# Patient Record
Sex: MALE
[De-identification: names, ages, dates, MRNs, and addresses within clinical notes are randomized per-mention and may not be internally consistent; named-entity substitution may affect disease eponyms.]

---

## 2017-04-27 ENCOUNTER — RECORDS - HEALTHEAST (OUTPATIENT)
Dept: ADMINISTRATIVE | Facility: OTHER | Age: 57
End: 2017-04-27

## 2017-04-28 ENCOUNTER — RECORDS - HEALTHEAST (OUTPATIENT)
Dept: ADMINISTRATIVE | Facility: OTHER | Age: 57
End: 2017-04-28

## 2017-05-04 ENCOUNTER — RECORDS - HEALTHEAST (OUTPATIENT)
Dept: ADMINISTRATIVE | Facility: OTHER | Age: 57
End: 2017-05-04

## 2017-05-18 ENCOUNTER — RECORDS - HEALTHEAST (OUTPATIENT)
Dept: ADMINISTRATIVE | Facility: OTHER | Age: 57
End: 2017-05-18

## 2017-06-16 ENCOUNTER — RECORDS - HEALTHEAST (OUTPATIENT)
Dept: ADMINISTRATIVE | Facility: OTHER | Age: 57
End: 2017-06-16

## 2017-09-15 ENCOUNTER — RECORDS - HEALTHEAST (OUTPATIENT)
Dept: ADMINISTRATIVE | Facility: OTHER | Age: 57
End: 2017-09-15

## 2017-09-21 ENCOUNTER — RECORDS - HEALTHEAST (OUTPATIENT)
Dept: ADMINISTRATIVE | Facility: OTHER | Age: 57
End: 2017-09-21

## 2017-10-06 ENCOUNTER — RECORDS - HEALTHEAST (OUTPATIENT)
Dept: ADMINISTRATIVE | Facility: OTHER | Age: 57
End: 2017-10-06

## 2017-10-10 ENCOUNTER — RECORDS - HEALTHEAST (OUTPATIENT)
Dept: ADMINISTRATIVE | Facility: OTHER | Age: 57
End: 2017-10-10

## 2017-10-11 ENCOUNTER — RECORDS - HEALTHEAST (OUTPATIENT)
Dept: ADMINISTRATIVE | Facility: OTHER | Age: 57
End: 2017-10-11

## 2017-10-19 ENCOUNTER — RECORDS - HEALTHEAST (OUTPATIENT)
Dept: ADMINISTRATIVE | Facility: OTHER | Age: 57
End: 2017-10-19

## 2017-10-20 ENCOUNTER — RECORDS - HEALTHEAST (OUTPATIENT)
Dept: ADMINISTRATIVE | Facility: OTHER | Age: 57
End: 2017-10-20

## 2017-11-01 ENCOUNTER — RECORDS - HEALTHEAST (OUTPATIENT)
Dept: ADMINISTRATIVE | Facility: OTHER | Age: 57
End: 2017-11-01

## 2017-11-10 ENCOUNTER — RECORDS - HEALTHEAST (OUTPATIENT)
Dept: ADMINISTRATIVE | Facility: OTHER | Age: 57
End: 2017-11-10

## 2017-11-14 ENCOUNTER — RECORDS - HEALTHEAST (OUTPATIENT)
Dept: ADMINISTRATIVE | Facility: OTHER | Age: 57
End: 2017-11-14

## 2017-11-16 ENCOUNTER — RECORDS - HEALTHEAST (OUTPATIENT)
Dept: ADMINISTRATIVE | Facility: OTHER | Age: 57
End: 2017-11-16

## 2017-11-21 ENCOUNTER — RECORDS - HEALTHEAST (OUTPATIENT)
Dept: ADMINISTRATIVE | Facility: OTHER | Age: 57
End: 2017-11-21

## 2017-11-24 ENCOUNTER — RECORDS - HEALTHEAST (OUTPATIENT)
Dept: ADMINISTRATIVE | Facility: OTHER | Age: 57
End: 2017-11-24

## 2017-11-27 ENCOUNTER — RECORDS - HEALTHEAST (OUTPATIENT)
Dept: ADMINISTRATIVE | Facility: OTHER | Age: 57
End: 2017-11-27

## 2017-12-06 ENCOUNTER — RECORDS - HEALTHEAST (OUTPATIENT)
Dept: ADMINISTRATIVE | Facility: OTHER | Age: 57
End: 2017-12-06

## 2017-12-07 ENCOUNTER — RECORDS - HEALTHEAST (OUTPATIENT)
Dept: ADMINISTRATIVE | Facility: OTHER | Age: 57
End: 2017-12-07

## 2017-12-13 ENCOUNTER — RECORDS - HEALTHEAST (OUTPATIENT)
Dept: ADMINISTRATIVE | Facility: OTHER | Age: 57
End: 2017-12-13

## 2017-12-19 ENCOUNTER — RECORDS - HEALTHEAST (OUTPATIENT)
Dept: ADMINISTRATIVE | Facility: OTHER | Age: 57
End: 2017-12-19

## 2017-12-21 ENCOUNTER — RECORDS - HEALTHEAST (OUTPATIENT)
Dept: ADMINISTRATIVE | Facility: OTHER | Age: 57
End: 2017-12-21

## 2017-12-26 ENCOUNTER — RECORDS - HEALTHEAST (OUTPATIENT)
Dept: ADMINISTRATIVE | Facility: OTHER | Age: 57
End: 2017-12-26

## 2017-12-28 ENCOUNTER — RECORDS - HEALTHEAST (OUTPATIENT)
Dept: ADMINISTRATIVE | Facility: OTHER | Age: 57
End: 2017-12-28

## 2018-01-04 ENCOUNTER — RECORDS - HEALTHEAST (OUTPATIENT)
Dept: ADMINISTRATIVE | Facility: OTHER | Age: 58
End: 2018-01-04

## 2018-01-09 ENCOUNTER — RECORDS - HEALTHEAST (OUTPATIENT)
Dept: ADMINISTRATIVE | Facility: OTHER | Age: 58
End: 2018-01-09

## 2018-01-16 ENCOUNTER — RECORDS - HEALTHEAST (OUTPATIENT)
Dept: ADMINISTRATIVE | Facility: OTHER | Age: 58
End: 2018-01-16

## 2018-01-18 ENCOUNTER — RECORDS - HEALTHEAST (OUTPATIENT)
Dept: ADMINISTRATIVE | Facility: OTHER | Age: 58
End: 2018-01-18

## 2018-01-26 ENCOUNTER — RECORDS - HEALTHEAST (OUTPATIENT)
Dept: ADMINISTRATIVE | Facility: OTHER | Age: 58
End: 2018-01-26

## 2018-02-15 ENCOUNTER — RECORDS - HEALTHEAST (OUTPATIENT)
Dept: ADMINISTRATIVE | Facility: OTHER | Age: 58
End: 2018-02-15

## 2018-02-22 ENCOUNTER — RECORDS - HEALTHEAST (OUTPATIENT)
Dept: ADMINISTRATIVE | Facility: OTHER | Age: 58
End: 2018-02-22

## 2018-02-26 ENCOUNTER — RECORDS - HEALTHEAST (OUTPATIENT)
Dept: ADMINISTRATIVE | Facility: OTHER | Age: 58
End: 2018-02-26

## 2018-02-27 ENCOUNTER — RECORDS - HEALTHEAST (OUTPATIENT)
Dept: ADMINISTRATIVE | Facility: OTHER | Age: 58
End: 2018-02-27

## 2018-03-22 ENCOUNTER — RECORDS - HEALTHEAST (OUTPATIENT)
Dept: ADMINISTRATIVE | Facility: OTHER | Age: 58
End: 2018-03-22

## 2018-03-26 ENCOUNTER — RECORDS - HEALTHEAST (OUTPATIENT)
Dept: ADMINISTRATIVE | Facility: OTHER | Age: 58
End: 2018-03-26

## 2018-03-29 ENCOUNTER — RECORDS - HEALTHEAST (OUTPATIENT)
Dept: ADMINISTRATIVE | Facility: OTHER | Age: 58
End: 2018-03-29

## 2018-04-05 ENCOUNTER — RECORDS - HEALTHEAST (OUTPATIENT)
Dept: ADMINISTRATIVE | Facility: OTHER | Age: 58
End: 2018-04-05

## 2018-04-10 ENCOUNTER — RECORDS - HEALTHEAST (OUTPATIENT)
Dept: ADMINISTRATIVE | Facility: OTHER | Age: 58
End: 2018-04-10

## 2018-04-12 ENCOUNTER — RECORDS - HEALTHEAST (OUTPATIENT)
Dept: ADMINISTRATIVE | Facility: OTHER | Age: 58
End: 2018-04-12

## 2018-04-16 ENCOUNTER — RECORDS - HEALTHEAST (OUTPATIENT)
Dept: ADMINISTRATIVE | Facility: OTHER | Age: 58
End: 2018-04-16

## 2018-04-17 ENCOUNTER — RECORDS - HEALTHEAST (OUTPATIENT)
Dept: ADMINISTRATIVE | Facility: OTHER | Age: 58
End: 2018-04-17

## 2018-04-27 ENCOUNTER — OFFICE VISIT - HEALTHEAST (OUTPATIENT)
Dept: ONCOLOGY | Facility: HOSPITAL | Age: 58
End: 2018-04-27

## 2018-04-27 ENCOUNTER — RECORDS - HEALTHEAST (OUTPATIENT)
Dept: ADMINISTRATIVE | Facility: OTHER | Age: 58
End: 2018-04-27

## 2018-04-27 DIAGNOSIS — R59.0 MEDIASTINAL LYMPHADENOPATHY: ICD-10-CM

## 2018-04-27 DIAGNOSIS — C61 MALIGNANT NEOPLASM OF PROSTATE (H): ICD-10-CM

## 2018-04-27 DIAGNOSIS — M54.16 LUMBAR RADICULOPATHY: ICD-10-CM

## 2018-04-27 DIAGNOSIS — C90.30 PLASMACYTOMA (H): ICD-10-CM

## 2018-04-27 RX ORDER — LISINOPRIL 40 MG/1
40 TABLET ORAL DAILY
Status: SHIPPED | COMMUNITY
Start: 2018-04-27

## 2018-05-02 ENCOUNTER — OFFICE VISIT - HEALTHEAST (OUTPATIENT)
Dept: RADIATION ONCOLOGY | Facility: HOSPITAL | Age: 58
End: 2018-05-02

## 2018-05-02 DIAGNOSIS — C61 MALIGNANT NEOPLASM OF PROSTATE (H): ICD-10-CM

## 2018-05-02 DIAGNOSIS — C90.30 PLASMACYTOMA (H): ICD-10-CM

## 2018-05-11 ENCOUNTER — RECORDS - HEALTHEAST (OUTPATIENT)
Dept: ADMINISTRATIVE | Facility: OTHER | Age: 58
End: 2018-05-11

## 2018-05-11 ENCOUNTER — AMBULATORY - HEALTHEAST (OUTPATIENT)
Dept: ONCOLOGY | Facility: HOSPITAL | Age: 58
End: 2018-05-11

## 2018-05-11 ENCOUNTER — AMBULATORY - HEALTHEAST (OUTPATIENT)
Dept: INFUSION THERAPY | Facility: HOSPITAL | Age: 58
End: 2018-05-11

## 2018-05-11 ENCOUNTER — INFUSION - HEALTHEAST (OUTPATIENT)
Dept: INFUSION THERAPY | Facility: HOSPITAL | Age: 58
End: 2018-05-11

## 2018-05-11 ENCOUNTER — OFFICE VISIT - HEALTHEAST (OUTPATIENT)
Dept: ONCOLOGY | Facility: HOSPITAL | Age: 58
End: 2018-05-11

## 2018-05-11 DIAGNOSIS — C90.30 PLASMACYTOMA (H): ICD-10-CM

## 2018-05-11 DIAGNOSIS — C61 MALIGNANT NEOPLASM OF PROSTATE (H): ICD-10-CM

## 2018-05-11 DIAGNOSIS — R59.0 MEDIASTINAL LYMPHADENOPATHY: ICD-10-CM

## 2018-05-11 DIAGNOSIS — E04.1 THYROID NODULE: ICD-10-CM

## 2018-05-11 LAB
ALBUMIN SERPL-MCNC: 3.7 G/DL (ref 3.5–5)
ALP SERPL-CCNC: 39 U/L (ref 45–120)
ALT SERPL W P-5'-P-CCNC: 38 U/L (ref 0–45)
ANION GAP SERPL CALCULATED.3IONS-SCNC: 10 MMOL/L (ref 5–18)
AST SERPL W P-5'-P-CCNC: 18 U/L (ref 0–40)
BASOPHILS # BLD AUTO: 0 THOU/UL (ref 0–0.2)
BASOPHILS NFR BLD AUTO: 0 % (ref 0–2)
BILIRUB SERPL-MCNC: 0.4 MG/DL (ref 0–1)
BUN SERPL-MCNC: 17 MG/DL (ref 8–22)
CALCIUM SERPL-MCNC: 9.8 MG/DL (ref 8.5–10.5)
CHLORIDE BLD-SCNC: 106 MMOL/L (ref 98–107)
CO2 SERPL-SCNC: 27 MMOL/L (ref 22–31)
CREAT SERPL-MCNC: 0.73 MG/DL (ref 0.7–1.3)
EOSINOPHIL COUNT (ABSOLUTE): 0 THOU/UL (ref 0–0.4)
EOSINOPHIL NFR BLD AUTO: 0 % (ref 0–6)
ERYTHROCYTE [DISTWIDTH] IN BLOOD BY AUTOMATED COUNT: 11.9 % (ref 11–14.5)
GFR SERPL CREATININE-BSD FRML MDRD: >60 ML/MIN/1.73M2
GLUCOSE BLD-MCNC: 92 MG/DL (ref 70–125)
HCT VFR BLD AUTO: 39 % (ref 40–54)
HGB BLD-MCNC: 13.6 G/DL (ref 14–18)
LYMPHOCYTES # BLD AUTO: 2 THOU/UL (ref 0.8–4.4)
LYMPHOCYTES NFR BLD AUTO: 17 % (ref 20–40)
MCH RBC QN AUTO: 34.2 PG (ref 27–34)
MCHC RBC AUTO-ENTMCNC: 34.9 G/DL (ref 32–36)
MCV RBC AUTO: 98 FL (ref 80–100)
MONOCYTES # BLD AUTO: 1.1 THOU/UL (ref 0–0.9)
MONOCYTES NFR BLD AUTO: 9 % (ref 2–10)
OVALOCYTES: ABNORMAL
PLAT MORPH BLD: NORMAL
PLATELET # BLD AUTO: 221 THOU/UL (ref 140–440)
PMV BLD AUTO: 10.2 FL (ref 8.5–12.5)
POTASSIUM BLD-SCNC: 3.9 MMOL/L (ref 3.5–5)
PROT SERPL-MCNC: 6.8 G/DL (ref 6–8)
PSA SERPL-MCNC: 5.2 NG/ML (ref 0–3.5)
RBC # BLD AUTO: 3.98 MILL/UL (ref 4.4–6.2)
SODIUM SERPL-SCNC: 143 MMOL/L (ref 136–145)
T4 FREE SERPL-MCNC: 0.6 NG/DL (ref 0.7–1.8)
TOTAL NEUTROPHILS-ABS(DIFF): 8.7 THOU/UL (ref 2–7.7)
TOTAL NEUTROPHILS-REL(DIFF): 74 % (ref 50–70)
TSH SERPL DL<=0.005 MIU/L-ACNC: 21.01 UIU/ML (ref 0.3–5)
WBC: 11.7 THOU/UL (ref 4–11)

## 2018-05-15 LAB
LAB AP CHARGES (HE HISTORICAL CONVERSION): ABNORMAL
PATH REPORT.COMMENTS IMP SPEC: ABNORMAL
PATH REPORT.COMMENTS IMP SPEC: ABNORMAL
PATH REPORT.FINAL DX SPEC: ABNORMAL
PATH REPORT.GROSS SPEC: ABNORMAL
PATH REPORT.MICROSCOPIC SPEC OTHER STN: ABNORMAL
PATH REPORT.RELEVANT HX SPEC: ABNORMAL
RESULT FLAG (HE HISTORICAL CONVERSION): ABNORMAL

## 2018-05-16 ENCOUNTER — RECORDS - HEALTHEAST (OUTPATIENT)
Dept: ADMINISTRATIVE | Facility: OTHER | Age: 58
End: 2018-05-16

## 2018-05-18 ENCOUNTER — INFUSION - HEALTHEAST (OUTPATIENT)
Dept: INFUSION THERAPY | Facility: HOSPITAL | Age: 58
End: 2018-05-18

## 2018-05-18 ENCOUNTER — AMBULATORY - HEALTHEAST (OUTPATIENT)
Dept: INFUSION THERAPY | Facility: HOSPITAL | Age: 58
End: 2018-05-18

## 2018-05-18 DIAGNOSIS — C90.30 PLASMACYTOMA (H): ICD-10-CM

## 2018-05-18 LAB
BASOPHILS # BLD AUTO: 0 THOU/UL (ref 0–0.2)
BASOPHILS NFR BLD AUTO: 0 % (ref 0–2)
EOSINOPHIL # BLD AUTO: 0.3 THOU/UL (ref 0–0.4)
EOSINOPHIL NFR BLD AUTO: 3 % (ref 0–6)
ERYTHROCYTE [DISTWIDTH] IN BLOOD BY AUTOMATED COUNT: 12.3 % (ref 11–14.5)
HCT VFR BLD AUTO: 38.3 % (ref 40–54)
HGB BLD-MCNC: 13.3 G/DL (ref 14–18)
LYMPHOCYTES # BLD AUTO: 2.1 THOU/UL (ref 0.8–4.4)
LYMPHOCYTES NFR BLD AUTO: 20 % (ref 20–40)
MCH RBC QN AUTO: 34.5 PG (ref 27–34)
MCHC RBC AUTO-ENTMCNC: 34.7 G/DL (ref 32–36)
MCV RBC AUTO: 100 FL (ref 80–100)
MONOCYTES # BLD AUTO: 0.9 THOU/UL (ref 0–0.9)
MONOCYTES NFR BLD AUTO: 8 % (ref 2–10)
NEUTROPHILS # BLD AUTO: 7.4 THOU/UL (ref 2–7.7)
NEUTROPHILS NFR BLD AUTO: 69 % (ref 50–70)
PLATELET # BLD AUTO: 212 THOU/UL (ref 140–440)
PMV BLD AUTO: 10.5 FL (ref 8.5–12.5)
RBC # BLD AUTO: 3.85 MILL/UL (ref 4.4–6.2)
WBC: 10.8 THOU/UL (ref 4–11)

## 2018-05-24 ENCOUNTER — COMMUNICATION - HEALTHEAST (OUTPATIENT)
Dept: ONCOLOGY | Facility: HOSPITAL | Age: 58
End: 2018-05-24

## 2018-05-25 ENCOUNTER — INFUSION - HEALTHEAST (OUTPATIENT)
Dept: INFUSION THERAPY | Facility: HOSPITAL | Age: 58
End: 2018-05-25

## 2018-05-25 DIAGNOSIS — C90.30 PLASMACYTOMA (H): ICD-10-CM

## 2018-05-29 ENCOUNTER — AMBULATORY - HEALTHEAST (OUTPATIENT)
Dept: ONCOLOGY | Facility: HOSPITAL | Age: 58
End: 2018-05-29

## 2018-05-29 DIAGNOSIS — C61 MALIGNANT NEOPLASM OF PROSTATE (H): ICD-10-CM

## 2018-05-30 ENCOUNTER — HOSPITAL ENCOUNTER (OUTPATIENT)
Dept: PET IMAGING | Facility: HOSPITAL | Age: 58
Setting detail: RADIATION/ONCOLOGY SERIES
Discharge: STILL ADMITTED OR ADMINISTRATIVE | End: 2018-05-30

## 2018-05-30 DIAGNOSIS — R59.0 MEDIASTINAL LYMPHADENOPATHY: ICD-10-CM

## 2018-05-30 DIAGNOSIS — C61 PROSTATE CANCER METASTATIC TO BONE (H): ICD-10-CM

## 2018-05-30 DIAGNOSIS — C79.51 PROSTATE CANCER METASTATIC TO BONE (H): ICD-10-CM

## 2018-05-30 DIAGNOSIS — C90.00 MULTIPLE MYELOMA, REMISSION STATUS UNSPECIFIED (H): ICD-10-CM

## 2018-05-30 LAB — GLUCOSE BLDC GLUCOMTR-MCNC: 90 MG/DL

## 2018-05-30 ASSESSMENT — MIFFLIN-ST. JEOR: SCORE: 1781.95

## 2018-06-01 ENCOUNTER — INFUSION - HEALTHEAST (OUTPATIENT)
Dept: INFUSION THERAPY | Facility: HOSPITAL | Age: 58
End: 2018-06-01

## 2018-06-01 ENCOUNTER — AMBULATORY - HEALTHEAST (OUTPATIENT)
Dept: INFUSION THERAPY | Facility: HOSPITAL | Age: 58
End: 2018-06-01

## 2018-06-01 DIAGNOSIS — C90.30 PLASMACYTOMA (H): ICD-10-CM

## 2018-06-01 LAB
ALBUMIN SERPL-MCNC: 3.6 G/DL (ref 3.5–5)
ALP SERPL-CCNC: 37 U/L (ref 45–120)
ALT SERPL W P-5'-P-CCNC: 41 U/L (ref 0–45)
ANION GAP SERPL CALCULATED.3IONS-SCNC: 10 MMOL/L (ref 5–18)
AST SERPL W P-5'-P-CCNC: 17 U/L (ref 0–40)
BASOPHILS # BLD AUTO: 0.1 THOU/UL (ref 0–0.2)
BASOPHILS NFR BLD AUTO: 1 % (ref 0–2)
BILIRUB SERPL-MCNC: 0.6 MG/DL (ref 0–1)
BUN SERPL-MCNC: 17 MG/DL (ref 8–22)
CALCIUM SERPL-MCNC: 9.4 MG/DL (ref 8.5–10.5)
CHLORIDE BLD-SCNC: 106 MMOL/L (ref 98–107)
CO2 SERPL-SCNC: 27 MMOL/L (ref 22–31)
CREAT SERPL-MCNC: 0.77 MG/DL (ref 0.7–1.3)
EOSINOPHIL # BLD AUTO: 0.2 THOU/UL (ref 0–0.4)
EOSINOPHIL NFR BLD AUTO: 2 % (ref 0–6)
ERYTHROCYTE [DISTWIDTH] IN BLOOD BY AUTOMATED COUNT: 13 % (ref 11–14.5)
GFR SERPL CREATININE-BSD FRML MDRD: >60 ML/MIN/1.73M2
GLUCOSE BLD-MCNC: 98 MG/DL (ref 70–125)
HCT VFR BLD AUTO: 37.6 % (ref 40–54)
HGB BLD-MCNC: 12.9 G/DL (ref 14–18)
LYMPHOCYTES # BLD AUTO: 1.5 THOU/UL (ref 0.8–4.4)
LYMPHOCYTES NFR BLD AUTO: 16 % (ref 20–40)
MCH RBC QN AUTO: 34 PG (ref 27–34)
MCHC RBC AUTO-ENTMCNC: 34.3 G/DL (ref 32–36)
MCV RBC AUTO: 99 FL (ref 80–100)
MONOCYTES # BLD AUTO: 0.8 THOU/UL (ref 0–0.9)
MONOCYTES NFR BLD AUTO: 8 % (ref 2–10)
NEUTROPHILS # BLD AUTO: 7.3 THOU/UL (ref 2–7.7)
NEUTROPHILS NFR BLD AUTO: 74 % (ref 50–70)
PLATELET # BLD AUTO: 243 THOU/UL (ref 140–440)
PMV BLD AUTO: 10.8 FL (ref 8.5–12.5)
POTASSIUM BLD-SCNC: 3.9 MMOL/L (ref 3.5–5)
PROT SERPL-MCNC: 6.5 G/DL (ref 6–8)
RBC # BLD AUTO: 3.79 MILL/UL (ref 4.4–6.2)
SODIUM SERPL-SCNC: 143 MMOL/L (ref 136–145)
WBC: 9.9 THOU/UL (ref 4–11)

## 2018-06-07 LAB
LAB AP CHARGES (HE HISTORICAL CONVERSION): ABNORMAL
PATH REPORT.COMMENTS IMP SPEC: ABNORMAL
PATH REPORT.COMMENTS IMP SPEC: ABNORMAL
PATH REPORT.FINAL DX SPEC: ABNORMAL
PATH REPORT.RELEVANT HX SPEC: ABNORMAL
RESULT FLAG (HE HISTORICAL CONVERSION): ABNORMAL

## 2018-06-15 ENCOUNTER — RECORDS - HEALTHEAST (OUTPATIENT)
Dept: ADMINISTRATIVE | Facility: OTHER | Age: 58
End: 2018-06-15

## 2018-06-15 ENCOUNTER — OFFICE VISIT - HEALTHEAST (OUTPATIENT)
Dept: ONCOLOGY | Facility: HOSPITAL | Age: 58
End: 2018-06-15

## 2018-06-15 ENCOUNTER — INFUSION - HEALTHEAST (OUTPATIENT)
Dept: INFUSION THERAPY | Facility: HOSPITAL | Age: 58
End: 2018-06-15

## 2018-06-15 ENCOUNTER — AMBULATORY - HEALTHEAST (OUTPATIENT)
Dept: INFUSION THERAPY | Facility: HOSPITAL | Age: 58
End: 2018-06-15

## 2018-06-15 DIAGNOSIS — C90.30 PLASMACYTOMA (H): ICD-10-CM

## 2018-06-15 DIAGNOSIS — M51.369 DEGENERATION OF LUMBAR INTERVERTEBRAL DISC: ICD-10-CM

## 2018-06-15 DIAGNOSIS — C79.51 PROSTATE CANCER METASTATIC TO BONE (H): ICD-10-CM

## 2018-06-15 DIAGNOSIS — C61 MALIGNANT NEOPLASM OF PROSTATE (H): ICD-10-CM

## 2018-06-15 DIAGNOSIS — D47.2 SMOLDERING MYELOMA: ICD-10-CM

## 2018-06-15 DIAGNOSIS — C61 PROSTATE CANCER METASTATIC TO BONE (H): ICD-10-CM

## 2018-06-15 DIAGNOSIS — E06.5 THYROIDITIS, CHRONIC: ICD-10-CM

## 2018-06-15 LAB
ALBUMIN SERPL-MCNC: 3.9 G/DL (ref 3.5–5)
ALP SERPL-CCNC: 42 U/L (ref 45–120)
ALT SERPL W P-5'-P-CCNC: 39 U/L (ref 0–45)
ANION GAP SERPL CALCULATED.3IONS-SCNC: 9 MMOL/L (ref 5–18)
AST SERPL W P-5'-P-CCNC: 21 U/L (ref 0–40)
BASOPHILS # BLD AUTO: 0 THOU/UL (ref 0–0.2)
BASOPHILS NFR BLD AUTO: 0 % (ref 0–2)
BILIRUB SERPL-MCNC: 0.6 MG/DL (ref 0–1)
BUN SERPL-MCNC: 18 MG/DL (ref 8–22)
CALCIUM SERPL-MCNC: 9.7 MG/DL (ref 8.5–10.5)
CHLORIDE BLD-SCNC: 106 MMOL/L (ref 98–107)
CO2 SERPL-SCNC: 26 MMOL/L (ref 22–31)
CREAT SERPL-MCNC: 0.73 MG/DL (ref 0.7–1.3)
EOSINOPHIL # BLD AUTO: 0.1 THOU/UL (ref 0–0.4)
EOSINOPHIL NFR BLD AUTO: 1 % (ref 0–6)
ERYTHROCYTE [DISTWIDTH] IN BLOOD BY AUTOMATED COUNT: 13.2 % (ref 11–14.5)
GFR SERPL CREATININE-BSD FRML MDRD: >60 ML/MIN/1.73M2
GLUCOSE BLD-MCNC: 100 MG/DL (ref 70–125)
HCT VFR BLD AUTO: 38.4 % (ref 40–54)
HGB BLD-MCNC: 13.4 G/DL (ref 14–18)
LYMPHOCYTES # BLD AUTO: 0.8 THOU/UL (ref 0.8–4.4)
LYMPHOCYTES NFR BLD AUTO: 6 % (ref 20–40)
MCH RBC QN AUTO: 34.4 PG (ref 27–34)
MCHC RBC AUTO-ENTMCNC: 34.9 G/DL (ref 32–36)
MCV RBC AUTO: 99 FL (ref 80–100)
MONOCYTES # BLD AUTO: 0.3 THOU/UL (ref 0–0.9)
MONOCYTES NFR BLD AUTO: 3 % (ref 2–10)
NEUTROPHILS # BLD AUTO: 10.8 THOU/UL (ref 2–7.7)
NEUTROPHILS NFR BLD AUTO: 90 % (ref 50–70)
PLATELET # BLD AUTO: 320 THOU/UL (ref 140–440)
PMV BLD AUTO: 9.8 FL (ref 8.5–12.5)
POTASSIUM BLD-SCNC: 3.9 MMOL/L (ref 3.5–5)
PROT SERPL-MCNC: 7.1 G/DL (ref 6–8)
RBC # BLD AUTO: 3.9 MILL/UL (ref 4.4–6.2)
SODIUM SERPL-SCNC: 141 MMOL/L (ref 136–145)
WBC: 12 THOU/UL (ref 4–11)

## 2018-06-15 ASSESSMENT — MIFFLIN-ST. JEOR: SCORE: 1786.48

## 2018-06-16 ENCOUNTER — RECORDS - HEALTHEAST (OUTPATIENT)
Dept: ADMINISTRATIVE | Facility: OTHER | Age: 58
End: 2018-06-16

## 2018-06-18 ENCOUNTER — COMMUNICATION - HEALTHEAST (OUTPATIENT)
Dept: PHYSICAL MEDICINE AND REHAB | Facility: CLINIC | Age: 58
End: 2018-06-18

## 2018-06-26 ENCOUNTER — INFUSION - HEALTHEAST (OUTPATIENT)
Dept: INFUSION THERAPY | Facility: HOSPITAL | Age: 58
End: 2018-06-26

## 2018-06-26 DIAGNOSIS — D47.2 SMOLDERING MYELOMA: ICD-10-CM

## 2018-07-03 ENCOUNTER — INFUSION - HEALTHEAST (OUTPATIENT)
Dept: INFUSION THERAPY | Facility: HOSPITAL | Age: 58
End: 2018-07-03

## 2018-07-03 DIAGNOSIS — D47.2 SMOLDERING MYELOMA: ICD-10-CM

## 2018-07-18 ENCOUNTER — OFFICE VISIT - HEALTHEAST (OUTPATIENT)
Dept: ONCOLOGY | Facility: HOSPITAL | Age: 58
End: 2018-07-18

## 2018-07-18 ENCOUNTER — INFUSION - HEALTHEAST (OUTPATIENT)
Dept: INFUSION THERAPY | Facility: HOSPITAL | Age: 58
End: 2018-07-18

## 2018-07-18 ENCOUNTER — RECORDS - HEALTHEAST (OUTPATIENT)
Dept: ADMINISTRATIVE | Facility: OTHER | Age: 58
End: 2018-07-18

## 2018-07-18 ENCOUNTER — AMBULATORY - HEALTHEAST (OUTPATIENT)
Dept: INFUSION THERAPY | Facility: HOSPITAL | Age: 58
End: 2018-07-18

## 2018-07-18 DIAGNOSIS — C61 PROSTATE CANCER (H): ICD-10-CM

## 2018-07-18 DIAGNOSIS — C61 PROSTATE CANCER METASTATIC TO BONE (H): ICD-10-CM

## 2018-07-18 DIAGNOSIS — G47.00 INSOMNIA: ICD-10-CM

## 2018-07-18 DIAGNOSIS — C90.00 MULTIPLE MYELOMA NOT HAVING ACHIEVED REMISSION (H): ICD-10-CM

## 2018-07-18 DIAGNOSIS — D47.2 SMOLDERING MYELOMA: ICD-10-CM

## 2018-07-18 DIAGNOSIS — M51.369 DEGENERATION OF LUMBAR INTERVERTEBRAL DISC: ICD-10-CM

## 2018-07-18 DIAGNOSIS — M54.9 BACK PAIN: ICD-10-CM

## 2018-07-18 DIAGNOSIS — C79.51 PROSTATE CANCER METASTATIC TO BONE (H): ICD-10-CM

## 2018-07-18 LAB
ALBUMIN SERPL-MCNC: 3.8 G/DL (ref 3.5–5)
ALP SERPL-CCNC: 33 U/L (ref 45–120)
ALT SERPL W P-5'-P-CCNC: 17 U/L (ref 0–45)
ANION GAP SERPL CALCULATED.3IONS-SCNC: 10 MMOL/L (ref 5–18)
AST SERPL W P-5'-P-CCNC: 10 U/L (ref 0–40)
BASOPHILS # BLD AUTO: 0 THOU/UL (ref 0–0.2)
BASOPHILS NFR BLD AUTO: 0 % (ref 0–2)
BILIRUB SERPL-MCNC: 0.4 MG/DL (ref 0–1)
BUN SERPL-MCNC: 26 MG/DL (ref 8–22)
CALCIUM SERPL-MCNC: 9.7 MG/DL (ref 8.5–10.5)
CHLORIDE BLD-SCNC: 109 MMOL/L (ref 98–107)
CO2 SERPL-SCNC: 22 MMOL/L (ref 22–31)
CREAT SERPL-MCNC: 0.74 MG/DL (ref 0.7–1.3)
EOSINOPHIL # BLD AUTO: 0 THOU/UL (ref 0–0.4)
EOSINOPHIL NFR BLD AUTO: 0 % (ref 0–6)
ERYTHROCYTE [DISTWIDTH] IN BLOOD BY AUTOMATED COUNT: 13.4 % (ref 11–14.5)
GFR SERPL CREATININE-BSD FRML MDRD: >60 ML/MIN/1.73M2
GLUCOSE BLD-MCNC: 114 MG/DL (ref 70–125)
HCT VFR BLD AUTO: 35.9 % (ref 40–54)
HGB BLD-MCNC: 12.6 G/DL (ref 14–18)
IGA SERPL-MCNC: 331 MG/DL (ref 65–400)
IGA SERPL-MCNC: 906 MG/DL (ref 700–1700)
IGM SERPL-MCNC: 61 MG/DL (ref 60–280)
LYMPHOCYTES # BLD AUTO: 1.5 THOU/UL (ref 0.8–4.4)
LYMPHOCYTES NFR BLD AUTO: 11 % (ref 20–40)
MCH RBC QN AUTO: 34.6 PG (ref 27–34)
MCHC RBC AUTO-ENTMCNC: 35.1 G/DL (ref 32–36)
MCV RBC AUTO: 99 FL (ref 80–100)
MONOCYTES # BLD AUTO: 1.4 THOU/UL (ref 0–0.9)
MONOCYTES NFR BLD AUTO: 10 % (ref 2–10)
NEUTROPHILS # BLD AUTO: 11.5 THOU/UL (ref 2–7.7)
NEUTROPHILS NFR BLD AUTO: 80 % (ref 50–70)
PLATELET # BLD AUTO: 319 THOU/UL (ref 140–440)
PMV BLD AUTO: 10 FL (ref 8.5–12.5)
POTASSIUM BLD-SCNC: 3.7 MMOL/L (ref 3.5–5)
PROT SERPL-MCNC: 6.9 G/DL (ref 6–8)
PSA SERPL-MCNC: 5.1 NG/ML (ref 0–3.5)
RBC # BLD AUTO: 3.64 MILL/UL (ref 4.4–6.2)
SODIUM SERPL-SCNC: 141 MMOL/L (ref 136–145)
WBC: 14.5 THOU/UL (ref 4–11)

## 2018-07-19 LAB
KAPPA LC FREE SER-MCNC: 0.96 MG/DL (ref 0.33–1.94)
KAPPA LC FREE/LAMBDA FREE SER NEPH: 1.1 {RATIO} (ref 0.26–1.65)
LAMBDA LC FREE SERPL-MCNC: 0.87 MG/DL (ref 0.57–2.63)

## 2018-07-20 LAB
ALBUMIN PERCENT: 62.5 % (ref 51–67)
ALBUMIN SERPL ELPH-MCNC: 4.2 G/DL (ref 3.2–4.7)
ALPHA 1 PERCENT: 2.3 % (ref 2–4)
ALPHA 2 PERCENT: 10.6 % (ref 5–13)
ALPHA1 GLOB SERPL ELPH-MCNC: 0.2 G/DL (ref 0.1–0.3)
ALPHA2 GLOB SERPL ELPH-MCNC: 0.7 G/DL (ref 0.4–0.9)
B-GLOBULIN SERPL ELPH-MCNC: 0.8 G/DL (ref 0.7–1.2)
BETA PERCENT: 11.4 % (ref 10–17)
GAMMA GLOB SERPL ELPH-MCNC: 0.9 G/DL (ref 0.6–1.4)
GAMMA GLOBULIN PERCENT: 13.2 % (ref 9–20)
PATH ICD:: NORMAL
PATH ICD:: NORMAL
PROT PATTERN SERPL ELPH-IMP: NORMAL
PROT PATTERN SERPL IFE-IMP: NORMAL
PROT SERPL-MCNC: 6.7 G/DL (ref 6–8)
REVIEWING PATHOLOGIST: NORMAL
REVIEWING PATHOLOGIST: NORMAL

## 2018-08-01 ENCOUNTER — INFUSION - HEALTHEAST (OUTPATIENT)
Dept: INFUSION THERAPY | Facility: HOSPITAL | Age: 58
End: 2018-08-01

## 2018-08-01 ENCOUNTER — AMBULATORY - HEALTHEAST (OUTPATIENT)
Dept: INFUSION THERAPY | Facility: HOSPITAL | Age: 58
End: 2018-08-01

## 2018-08-01 DIAGNOSIS — C79.51 PROSTATE CANCER METASTATIC TO BONE (H): ICD-10-CM

## 2018-08-01 DIAGNOSIS — C90.00 MULTIPLE MYELOMA NOT HAVING ACHIEVED REMISSION (H): ICD-10-CM

## 2018-08-01 DIAGNOSIS — C61 PROSTATE CANCER METASTATIC TO BONE (H): ICD-10-CM

## 2018-08-01 LAB
ALBUMIN SERPL-MCNC: 3.8 G/DL (ref 3.5–5)
ALP SERPL-CCNC: 37 U/L (ref 45–120)
ALT SERPL W P-5'-P-CCNC: 18 U/L (ref 0–45)
ANION GAP SERPL CALCULATED.3IONS-SCNC: 6 MMOL/L (ref 5–18)
AST SERPL W P-5'-P-CCNC: 12 U/L (ref 0–40)
BASOPHILS # BLD AUTO: 0 THOU/UL (ref 0–0.2)
BASOPHILS NFR BLD AUTO: 0 % (ref 0–2)
BILIRUB SERPL-MCNC: 0.8 MG/DL (ref 0–1)
BUN SERPL-MCNC: 26 MG/DL (ref 8–22)
CALCIUM SERPL-MCNC: 10.1 MG/DL (ref 8.5–10.5)
CHLORIDE BLD-SCNC: 107 MMOL/L (ref 98–107)
CO2 SERPL-SCNC: 27 MMOL/L (ref 22–31)
CREAT SERPL-MCNC: 0.77 MG/DL (ref 0.7–1.3)
EOSINOPHIL # BLD AUTO: 0 THOU/UL (ref 0–0.4)
EOSINOPHIL NFR BLD AUTO: 0 % (ref 0–6)
ERYTHROCYTE [DISTWIDTH] IN BLOOD BY AUTOMATED COUNT: 13.6 % (ref 11–14.5)
GFR SERPL CREATININE-BSD FRML MDRD: >60 ML/MIN/1.73M2
GLUCOSE BLD-MCNC: 107 MG/DL (ref 70–125)
HCT VFR BLD AUTO: 35.6 % (ref 40–54)
HGB BLD-MCNC: 12.4 G/DL (ref 14–18)
LYMPHOCYTES # BLD AUTO: 1.8 THOU/UL (ref 0.8–4.4)
LYMPHOCYTES NFR BLD AUTO: 11 % (ref 20–40)
MCH RBC QN AUTO: 35.1 PG (ref 27–34)
MCHC RBC AUTO-ENTMCNC: 34.8 G/DL (ref 32–36)
MCV RBC AUTO: 101 FL (ref 80–100)
MONOCYTES # BLD AUTO: 1.1 THOU/UL (ref 0–0.9)
MONOCYTES NFR BLD AUTO: 7 % (ref 2–10)
NEUTROPHILS # BLD AUTO: 13.1 THOU/UL (ref 2–7.7)
NEUTROPHILS NFR BLD AUTO: 82 % (ref 50–70)
PLATELET # BLD AUTO: 280 THOU/UL (ref 140–440)
PMV BLD AUTO: 10.4 FL (ref 8.5–12.5)
POTASSIUM BLD-SCNC: 4 MMOL/L (ref 3.5–5)
PROT SERPL-MCNC: 7 G/DL (ref 6–8)
RBC # BLD AUTO: 3.53 MILL/UL (ref 4.4–6.2)
SODIUM SERPL-SCNC: 140 MMOL/L (ref 136–145)
WBC: 16.1 THOU/UL (ref 4–11)

## 2018-08-01 ASSESSMENT — MIFFLIN-ST. JEOR: SCORE: 1822.77

## 2018-08-08 ENCOUNTER — INFUSION - HEALTHEAST (OUTPATIENT)
Dept: INFUSION THERAPY | Facility: HOSPITAL | Age: 58
End: 2018-08-08

## 2018-08-08 DIAGNOSIS — C90.00 MULTIPLE MYELOMA NOT HAVING ACHIEVED REMISSION (H): ICD-10-CM

## 2018-08-15 ENCOUNTER — INFUSION - HEALTHEAST (OUTPATIENT)
Dept: INFUSION THERAPY | Facility: HOSPITAL | Age: 58
End: 2018-08-15

## 2018-08-15 DIAGNOSIS — C90.00 MULTIPLE MYELOMA NOT HAVING ACHIEVED REMISSION (H): ICD-10-CM

## 2018-08-22 ENCOUNTER — AMBULATORY - HEALTHEAST (OUTPATIENT)
Dept: INFUSION THERAPY | Facility: HOSPITAL | Age: 58
End: 2018-08-22

## 2018-08-22 ENCOUNTER — INFUSION - HEALTHEAST (OUTPATIENT)
Dept: INFUSION THERAPY | Facility: HOSPITAL | Age: 58
End: 2018-08-22

## 2018-08-22 DIAGNOSIS — C90.00 MULTIPLE MYELOMA NOT HAVING ACHIEVED REMISSION (H): ICD-10-CM

## 2018-08-22 LAB
ALBUMIN SERPL-MCNC: 3.8 G/DL (ref 3.5–5)
ALP SERPL-CCNC: 40 U/L (ref 45–120)
ALT SERPL W P-5'-P-CCNC: 37 U/L (ref 0–45)
ANION GAP SERPL CALCULATED.3IONS-SCNC: 8 MMOL/L (ref 5–18)
AST SERPL W P-5'-P-CCNC: 23 U/L (ref 0–40)
BASOPHILS # BLD AUTO: 0 THOU/UL (ref 0–0.2)
BASOPHILS NFR BLD AUTO: 0 % (ref 0–2)
BILIRUB SERPL-MCNC: 0.8 MG/DL (ref 0–1)
BUN SERPL-MCNC: 15 MG/DL (ref 8–22)
CALCIUM SERPL-MCNC: 9.8 MG/DL (ref 8.5–10.5)
CHLORIDE BLD-SCNC: 107 MMOL/L (ref 98–107)
CO2 SERPL-SCNC: 28 MMOL/L (ref 22–31)
CREAT SERPL-MCNC: 0.79 MG/DL (ref 0.7–1.3)
EOSINOPHIL # BLD AUTO: 0.3 THOU/UL (ref 0–0.4)
EOSINOPHIL NFR BLD AUTO: 3 % (ref 0–6)
ERYTHROCYTE [DISTWIDTH] IN BLOOD BY AUTOMATED COUNT: 13.6 % (ref 11–14.5)
GFR SERPL CREATININE-BSD FRML MDRD: >60 ML/MIN/1.73M2
GLUCOSE BLD-MCNC: 96 MG/DL (ref 70–125)
HCT VFR BLD AUTO: 36.2 % (ref 40–54)
HGB BLD-MCNC: 12.3 G/DL (ref 14–18)
IGA SERPL-MCNC: 315 MG/DL (ref 65–400)
IGA SERPL-MCNC: 841 MG/DL (ref 700–1700)
IGM SERPL-MCNC: 49 MG/DL (ref 60–280)
LYMPHOCYTES # BLD AUTO: 1.8 THOU/UL (ref 0.8–4.4)
LYMPHOCYTES NFR BLD AUTO: 20 % (ref 20–40)
MAGNESIUM SERPL-MCNC: 2 MG/DL (ref 1.8–2.6)
MCH RBC QN AUTO: 35.2 PG (ref 27–34)
MCHC RBC AUTO-ENTMCNC: 34 G/DL (ref 32–36)
MCV RBC AUTO: 104 FL (ref 80–100)
MONOCYTES # BLD AUTO: 0.8 THOU/UL (ref 0–0.9)
MONOCYTES NFR BLD AUTO: 9 % (ref 2–10)
NEUTROPHILS # BLD AUTO: 6 THOU/UL (ref 2–7.7)
NEUTROPHILS NFR BLD AUTO: 68 % (ref 50–70)
PLATELET # BLD AUTO: 216 THOU/UL (ref 140–440)
PMV BLD AUTO: 11.3 FL (ref 8.5–12.5)
POTASSIUM BLD-SCNC: 3.7 MMOL/L (ref 3.5–5)
PROT SERPL-MCNC: 6.5 G/DL (ref 6–8)
RBC # BLD AUTO: 3.49 MILL/UL (ref 4.4–6.2)
SODIUM SERPL-SCNC: 143 MMOL/L (ref 136–145)
WBC: 9 THOU/UL (ref 4–11)

## 2018-08-23 LAB
ALBUMIN PERCENT: 64.8 % (ref 51–67)
ALBUMIN SERPL ELPH-MCNC: 4 G/DL (ref 3.2–4.7)
ALPHA 1 PERCENT: 2.6 % (ref 2–4)
ALPHA 2 PERCENT: 9.7 % (ref 5–13)
ALPHA1 GLOB SERPL ELPH-MCNC: 0.2 G/DL (ref 0.1–0.3)
ALPHA2 GLOB SERPL ELPH-MCNC: 0.6 G/DL (ref 0.4–0.9)
B-GLOBULIN SERPL ELPH-MCNC: 0.7 G/DL (ref 0.7–1.2)
BETA PERCENT: 11.1 % (ref 10–17)
GAMMA GLOB SERPL ELPH-MCNC: 0.7 G/DL (ref 0.6–1.4)
GAMMA GLOBULIN PERCENT: 11.8 % (ref 9–20)
KAPPA LC FREE SER-MCNC: 0.96 MG/DL (ref 0.33–1.94)
KAPPA LC FREE/LAMBDA FREE SER NEPH: 1.22 {RATIO} (ref 0.26–1.65)
LAMBDA LC FREE SERPL-MCNC: 0.79 MG/DL (ref 0.57–2.63)
PATH ICD:: NORMAL
PATH ICD:: NORMAL
PROT PATTERN SERPL ELPH-IMP: NORMAL
PROT PATTERN SERPL IFE-IMP: NORMAL
PROT SERPL-MCNC: 6.2 G/DL (ref 6–8)
REVIEWING PATHOLOGIST: NORMAL
REVIEWING PATHOLOGIST: NORMAL

## 2018-08-29 ENCOUNTER — INFUSION - HEALTHEAST (OUTPATIENT)
Dept: INFUSION THERAPY | Facility: HOSPITAL | Age: 58
End: 2018-08-29

## 2018-08-29 ENCOUNTER — OFFICE VISIT - HEALTHEAST (OUTPATIENT)
Dept: ONCOLOGY | Facility: HOSPITAL | Age: 58
End: 2018-08-29

## 2018-08-29 ENCOUNTER — AMBULATORY - HEALTHEAST (OUTPATIENT)
Dept: INFUSION THERAPY | Facility: HOSPITAL | Age: 58
End: 2018-08-29

## 2018-08-29 DIAGNOSIS — C90.00 MULTIPLE MYELOMA NOT HAVING ACHIEVED REMISSION (H): ICD-10-CM

## 2018-08-29 DIAGNOSIS — C79.51 PROSTATE CANCER METASTATIC TO BONE (H): ICD-10-CM

## 2018-08-29 DIAGNOSIS — E06.5 THYROIDITIS, CHRONIC: ICD-10-CM

## 2018-08-29 DIAGNOSIS — C61 PROSTATE CANCER METASTATIC TO BONE (H): ICD-10-CM

## 2018-08-29 DIAGNOSIS — M51.369 DEGENERATION OF LUMBAR INTERVERTEBRAL DISC: ICD-10-CM

## 2018-08-29 LAB
ALBUMIN SERPL-MCNC: 3.8 G/DL (ref 3.5–5)
ALP SERPL-CCNC: 41 U/L (ref 45–120)
ALT SERPL W P-5'-P-CCNC: 100 U/L (ref 0–45)
ANION GAP SERPL CALCULATED.3IONS-SCNC: 7 MMOL/L (ref 5–18)
AST SERPL W P-5'-P-CCNC: 36 U/L (ref 0–40)
BASOPHILS # BLD AUTO: 0.1 THOU/UL (ref 0–0.2)
BASOPHILS NFR BLD AUTO: 1 % (ref 0–2)
BILIRUB SERPL-MCNC: 0.4 MG/DL (ref 0–1)
BUN SERPL-MCNC: 17 MG/DL (ref 8–22)
CALCIUM SERPL-MCNC: 9.6 MG/DL (ref 8.5–10.5)
CHLORIDE BLD-SCNC: 109 MMOL/L (ref 98–107)
CO2 SERPL-SCNC: 27 MMOL/L (ref 22–31)
CREAT SERPL-MCNC: 0.71 MG/DL (ref 0.7–1.3)
EOSINOPHIL # BLD AUTO: 0.2 THOU/UL (ref 0–0.4)
EOSINOPHIL NFR BLD AUTO: 2 % (ref 0–6)
ERYTHROCYTE [DISTWIDTH] IN BLOOD BY AUTOMATED COUNT: 13.6 % (ref 11–14.5)
GFR SERPL CREATININE-BSD FRML MDRD: >60 ML/MIN/1.73M2
GLUCOSE BLD-MCNC: 78 MG/DL (ref 70–125)
HCT VFR BLD AUTO: 37.1 % (ref 40–54)
HGB BLD-MCNC: 12.4 G/DL (ref 14–18)
LYMPHOCYTES # BLD AUTO: 1.9 THOU/UL (ref 0.8–4.4)
LYMPHOCYTES NFR BLD AUTO: 21 % (ref 20–40)
MCH RBC QN AUTO: 35.2 PG (ref 27–34)
MCHC RBC AUTO-ENTMCNC: 33.4 G/DL (ref 32–36)
MCV RBC AUTO: 105 FL (ref 80–100)
MONOCYTES # BLD AUTO: 1 THOU/UL (ref 0–0.9)
MONOCYTES NFR BLD AUTO: 11 % (ref 2–10)
NEUTROPHILS # BLD AUTO: 6 THOU/UL (ref 2–7.7)
NEUTROPHILS NFR BLD AUTO: 66 % (ref 50–70)
PLATELET # BLD AUTO: 227 THOU/UL (ref 140–440)
PMV BLD AUTO: 11.4 FL (ref 8.5–12.5)
POTASSIUM BLD-SCNC: 3.8 MMOL/L (ref 3.5–5)
PROT SERPL-MCNC: 6.8 G/DL (ref 6–8)
RBC # BLD AUTO: 3.52 MILL/UL (ref 4.4–6.2)
SODIUM SERPL-SCNC: 143 MMOL/L (ref 136–145)
TSH SERPL DL<=0.005 MIU/L-ACNC: 5.22 UIU/ML (ref 0.3–5)
WBC: 9.2 THOU/UL (ref 4–11)

## 2018-09-04 ENCOUNTER — AMBULATORY - HEALTHEAST (OUTPATIENT)
Dept: ONCOLOGY | Facility: HOSPITAL | Age: 58
End: 2018-09-04

## 2018-09-05 ENCOUNTER — INFUSION - HEALTHEAST (OUTPATIENT)
Dept: INFUSION THERAPY | Facility: HOSPITAL | Age: 58
End: 2018-09-05

## 2018-09-05 DIAGNOSIS — C90.00 MULTIPLE MYELOMA NOT HAVING ACHIEVED REMISSION (H): ICD-10-CM

## 2018-09-07 ENCOUNTER — RECORDS - HEALTHEAST (OUTPATIENT)
Dept: ADMINISTRATIVE | Facility: OTHER | Age: 58
End: 2018-09-07

## 2018-09-12 ENCOUNTER — INFUSION - HEALTHEAST (OUTPATIENT)
Dept: INFUSION THERAPY | Facility: HOSPITAL | Age: 58
End: 2018-09-12

## 2018-09-12 DIAGNOSIS — C90.00 MULTIPLE MYELOMA NOT HAVING ACHIEVED REMISSION (H): ICD-10-CM

## 2018-09-19 ENCOUNTER — INFUSION - HEALTHEAST (OUTPATIENT)
Dept: INFUSION THERAPY | Facility: HOSPITAL | Age: 58
End: 2018-09-19

## 2018-09-19 ENCOUNTER — AMBULATORY - HEALTHEAST (OUTPATIENT)
Dept: INFUSION THERAPY | Facility: HOSPITAL | Age: 58
End: 2018-09-19

## 2018-09-19 DIAGNOSIS — C90.00 MULTIPLE MYELOMA NOT HAVING ACHIEVED REMISSION (H): ICD-10-CM

## 2018-09-19 LAB
ALBUMIN SERPL-MCNC: 3.8 G/DL (ref 3.5–5)
ALP SERPL-CCNC: 41 U/L (ref 45–120)
ALT SERPL W P-5'-P-CCNC: 21 U/L (ref 0–45)
ANION GAP SERPL CALCULATED.3IONS-SCNC: 7 MMOL/L (ref 5–18)
AST SERPL W P-5'-P-CCNC: 14 U/L (ref 0–40)
BASOPHILS # BLD AUTO: 0 THOU/UL (ref 0–0.2)
BASOPHILS NFR BLD AUTO: 0 % (ref 0–2)
BILIRUB SERPL-MCNC: 0.8 MG/DL (ref 0–1)
BUN SERPL-MCNC: 18 MG/DL (ref 8–22)
CALCIUM SERPL-MCNC: 9.8 MG/DL (ref 8.5–10.5)
CHLORIDE BLD-SCNC: 109 MMOL/L (ref 98–107)
CO2 SERPL-SCNC: 27 MMOL/L (ref 22–31)
CREAT SERPL-MCNC: 0.8 MG/DL (ref 0.7–1.3)
EOSINOPHIL # BLD AUTO: 0.1 THOU/UL (ref 0–0.4)
EOSINOPHIL NFR BLD AUTO: 1 % (ref 0–6)
ERYTHROCYTE [DISTWIDTH] IN BLOOD BY AUTOMATED COUNT: 12.5 % (ref 11–14.5)
GFR SERPL CREATININE-BSD FRML MDRD: >60 ML/MIN/1.73M2
GLUCOSE BLD-MCNC: 96 MG/DL (ref 70–125)
HCT VFR BLD AUTO: 39.3 % (ref 40–54)
HGB BLD-MCNC: 13.4 G/DL (ref 14–18)
IGA SERPL-MCNC: 375 MG/DL (ref 65–400)
IGA SERPL-MCNC: 894 MG/DL (ref 700–1700)
IGM SERPL-MCNC: 42 MG/DL (ref 60–280)
LYMPHOCYTES # BLD AUTO: 2 THOU/UL (ref 0.8–4.4)
LYMPHOCYTES NFR BLD AUTO: 19 % (ref 20–40)
MCH RBC QN AUTO: 35.6 PG (ref 27–34)
MCHC RBC AUTO-ENTMCNC: 34.1 G/DL (ref 32–36)
MCV RBC AUTO: 105 FL (ref 80–100)
MONOCYTES # BLD AUTO: 1 THOU/UL (ref 0–0.9)
MONOCYTES NFR BLD AUTO: 9 % (ref 2–10)
NEUTROPHILS # BLD AUTO: 7.3 THOU/UL (ref 2–7.7)
NEUTROPHILS NFR BLD AUTO: 70 % (ref 50–70)
PLATELET # BLD AUTO: 249 THOU/UL (ref 140–440)
PMV BLD AUTO: 11.2 FL (ref 8.5–12.5)
POTASSIUM BLD-SCNC: 3.7 MMOL/L (ref 3.5–5)
PROT SERPL-MCNC: 6.7 G/DL (ref 6–8)
RBC # BLD AUTO: 3.76 MILL/UL (ref 4.4–6.2)
SODIUM SERPL-SCNC: 143 MMOL/L (ref 136–145)
WBC: 10.5 THOU/UL (ref 4–11)

## 2018-09-20 LAB
KAPPA LC FREE SER-MCNC: 1.1 MG/DL (ref 0.33–1.94)
KAPPA LC FREE/LAMBDA FREE SER NEPH: 1.04 {RATIO} (ref 0.26–1.65)
LAMBDA LC FREE SERPL-MCNC: 1.06 MG/DL (ref 0.57–2.63)

## 2018-09-21 LAB
ALBUMIN PERCENT: 63.6 % (ref 51–67)
ALBUMIN SERPL ELPH-MCNC: 4.3 G/DL (ref 3.2–4.7)
ALPHA 1 PERCENT: 2.7 % (ref 2–4)
ALPHA 2 PERCENT: 10.2 % (ref 5–13)
ALPHA1 GLOB SERPL ELPH-MCNC: 0.2 G/DL (ref 0.1–0.3)
ALPHA2 GLOB SERPL ELPH-MCNC: 0.7 G/DL (ref 0.4–0.9)
B-GLOBULIN SERPL ELPH-MCNC: 0.8 G/DL (ref 0.7–1.2)
BETA PERCENT: 11.2 % (ref 10–17)
GAMMA GLOB SERPL ELPH-MCNC: 0.8 G/DL (ref 0.6–1.4)
GAMMA GLOBULIN PERCENT: 12.3 % (ref 9–20)
PATH ICD:: NORMAL
PATH ICD:: NORMAL
PROT PATTERN SERPL ELPH-IMP: NORMAL
PROT PATTERN SERPL IFE-IMP: NORMAL
PROT SERPL-MCNC: 6.7 G/DL (ref 6–8)
REVIEWING PATHOLOGIST: NORMAL
REVIEWING PATHOLOGIST: NORMAL

## 2018-09-26 ENCOUNTER — OFFICE VISIT - HEALTHEAST (OUTPATIENT)
Dept: ONCOLOGY | Facility: HOSPITAL | Age: 58
End: 2018-09-26

## 2018-09-26 ENCOUNTER — INFUSION - HEALTHEAST (OUTPATIENT)
Dept: INFUSION THERAPY | Facility: HOSPITAL | Age: 58
End: 2018-09-26

## 2018-09-26 ENCOUNTER — AMBULATORY - HEALTHEAST (OUTPATIENT)
Dept: INFUSION THERAPY | Facility: HOSPITAL | Age: 58
End: 2018-09-26

## 2018-09-26 DIAGNOSIS — C90.00 MULTIPLE MYELOMA NOT HAVING ACHIEVED REMISSION (H): ICD-10-CM

## 2018-09-26 LAB
ALBUMIN SERPL-MCNC: 3.9 G/DL (ref 3.5–5)
ALP SERPL-CCNC: 41 U/L (ref 45–120)
ALT SERPL W P-5'-P-CCNC: 17 U/L (ref 0–45)
ANION GAP SERPL CALCULATED.3IONS-SCNC: 8 MMOL/L (ref 5–18)
AST SERPL W P-5'-P-CCNC: 14 U/L (ref 0–40)
BASOPHILS # BLD AUTO: 0 THOU/UL (ref 0–0.2)
BASOPHILS NFR BLD AUTO: 0 % (ref 0–2)
BILIRUB SERPL-MCNC: 0.6 MG/DL (ref 0–1)
BUN SERPL-MCNC: 18 MG/DL (ref 8–22)
CALCIUM SERPL-MCNC: 9.7 MG/DL (ref 8.5–10.5)
CHLORIDE BLD-SCNC: 106 MMOL/L (ref 98–107)
CO2 SERPL-SCNC: 29 MMOL/L (ref 22–31)
CREAT SERPL-MCNC: 0.74 MG/DL (ref 0.7–1.3)
EOSINOPHIL # BLD AUTO: 0.2 THOU/UL (ref 0–0.4)
EOSINOPHIL NFR BLD AUTO: 2 % (ref 0–6)
ERYTHROCYTE [DISTWIDTH] IN BLOOD BY AUTOMATED COUNT: 12.4 % (ref 11–14.5)
GFR SERPL CREATININE-BSD FRML MDRD: >60 ML/MIN/1.73M2
GLUCOSE BLD-MCNC: 88 MG/DL (ref 70–125)
HCT VFR BLD AUTO: 39.6 % (ref 40–54)
HGB BLD-MCNC: 13.4 G/DL (ref 14–18)
LYMPHOCYTES # BLD AUTO: 1.7 THOU/UL (ref 0.8–4.4)
LYMPHOCYTES NFR BLD AUTO: 18 % (ref 20–40)
MCH RBC QN AUTO: 35.1 PG (ref 27–34)
MCHC RBC AUTO-ENTMCNC: 33.8 G/DL (ref 32–36)
MCV RBC AUTO: 104 FL (ref 80–100)
MONOCYTES # BLD AUTO: 0.7 THOU/UL (ref 0–0.9)
MONOCYTES NFR BLD AUTO: 8 % (ref 2–10)
NEUTROPHILS # BLD AUTO: 7 THOU/UL (ref 2–7.7)
NEUTROPHILS NFR BLD AUTO: 73 % (ref 50–70)
PLATELET # BLD AUTO: 237 THOU/UL (ref 140–440)
PMV BLD AUTO: 11.6 FL (ref 8.5–12.5)
POTASSIUM BLD-SCNC: 3.5 MMOL/L (ref 3.5–5)
PROT SERPL-MCNC: 6.7 G/DL (ref 6–8)
RBC # BLD AUTO: 3.82 MILL/UL (ref 4.4–6.2)
SODIUM SERPL-SCNC: 143 MMOL/L (ref 136–145)
WBC: 9.7 THOU/UL (ref 4–11)

## 2018-09-28 ENCOUNTER — HOSPITAL ENCOUNTER (OUTPATIENT)
Dept: MRI IMAGING | Facility: CLINIC | Age: 58
Discharge: HOME | End: 2018-09-28
Attending: FAMILY MEDICINE

## 2018-09-28 ENCOUNTER — RECORDS - HEALTHEAST (OUTPATIENT)
Dept: ADMINISTRATIVE | Facility: OTHER | Age: 58
End: 2018-09-28

## 2018-09-28 DIAGNOSIS — C79.51 PROSTATE CANCER METASTATIC TO BONE (H): ICD-10-CM

## 2018-09-28 DIAGNOSIS — C61 PROSTATE CANCER METASTATIC TO BONE (H): ICD-10-CM

## 2018-09-28 DIAGNOSIS — C90.30 PLASMACYTOMA (H): ICD-10-CM

## 2018-10-04 ENCOUNTER — INFUSION - HEALTHEAST (OUTPATIENT)
Dept: INFUSION THERAPY | Facility: HOSPITAL | Age: 58
End: 2018-10-04

## 2018-10-04 DIAGNOSIS — C90.00 MULTIPLE MYELOMA NOT HAVING ACHIEVED REMISSION (H): ICD-10-CM

## 2018-10-10 ENCOUNTER — INFUSION - HEALTHEAST (OUTPATIENT)
Dept: INFUSION THERAPY | Facility: HOSPITAL | Age: 58
End: 2018-10-10

## 2018-10-10 DIAGNOSIS — C90.00 MULTIPLE MYELOMA NOT HAVING ACHIEVED REMISSION (H): ICD-10-CM

## 2018-10-17 ENCOUNTER — INFUSION - HEALTHEAST (OUTPATIENT)
Dept: INFUSION THERAPY | Facility: HOSPITAL | Age: 58
End: 2018-10-17

## 2018-10-17 ENCOUNTER — AMBULATORY - HEALTHEAST (OUTPATIENT)
Dept: INFUSION THERAPY | Facility: HOSPITAL | Age: 58
End: 2018-10-17

## 2018-10-17 DIAGNOSIS — C90.00 MULTIPLE MYELOMA NOT HAVING ACHIEVED REMISSION (H): ICD-10-CM

## 2018-10-17 LAB
ALBUMIN SERPL-MCNC: 3.9 G/DL (ref 3.5–5)
ALP SERPL-CCNC: 40 U/L (ref 45–120)
ALT SERPL W P-5'-P-CCNC: 23 U/L (ref 0–45)
ANION GAP SERPL CALCULATED.3IONS-SCNC: 9 MMOL/L (ref 5–18)
AST SERPL W P-5'-P-CCNC: 14 U/L (ref 0–40)
BASOPHILS # BLD AUTO: 0 THOU/UL (ref 0–0.2)
BASOPHILS NFR BLD AUTO: 0 % (ref 0–2)
BILIRUB SERPL-MCNC: 0.5 MG/DL (ref 0–1)
BUN SERPL-MCNC: 18 MG/DL (ref 8–22)
CALCIUM SERPL-MCNC: 9.8 MG/DL (ref 8.5–10.5)
CHLORIDE BLD-SCNC: 107 MMOL/L (ref 98–107)
CO2 SERPL-SCNC: 27 MMOL/L (ref 22–31)
CREAT SERPL-MCNC: 0.78 MG/DL (ref 0.7–1.3)
EOSINOPHIL # BLD AUTO: 0.2 THOU/UL (ref 0–0.4)
EOSINOPHIL NFR BLD AUTO: 2 % (ref 0–6)
ERYTHROCYTE [DISTWIDTH] IN BLOOD BY AUTOMATED COUNT: 12.1 % (ref 11–14.5)
GFR SERPL CREATININE-BSD FRML MDRD: >60 ML/MIN/1.73M2
GLUCOSE BLD-MCNC: 92 MG/DL (ref 70–125)
HCT VFR BLD AUTO: 39.8 % (ref 40–54)
HGB BLD-MCNC: 13.6 G/DL (ref 14–18)
IGA SERPL-MCNC: 369 MG/DL (ref 65–400)
IGA SERPL-MCNC: 908 MG/DL (ref 700–1700)
IGM SERPL-MCNC: 39 MG/DL (ref 60–280)
LYMPHOCYTES # BLD AUTO: 2 THOU/UL (ref 0.8–4.4)
LYMPHOCYTES NFR BLD AUTO: 19 % (ref 20–40)
MCH RBC QN AUTO: 35.5 PG (ref 27–34)
MCHC RBC AUTO-ENTMCNC: 34.2 G/DL (ref 32–36)
MCV RBC AUTO: 104 FL (ref 80–100)
MONOCYTES # BLD AUTO: 1 THOU/UL (ref 0–0.9)
MONOCYTES NFR BLD AUTO: 9 % (ref 2–10)
NEUTROPHILS # BLD AUTO: 7.2 THOU/UL (ref 2–7.7)
NEUTROPHILS NFR BLD AUTO: 70 % (ref 50–70)
PLATELET # BLD AUTO: 225 THOU/UL (ref 140–440)
PMV BLD AUTO: 11.4 FL (ref 8.5–12.5)
POTASSIUM BLD-SCNC: 4 MMOL/L (ref 3.5–5)
PROT SERPL-MCNC: 6.8 G/DL (ref 6–8)
PSA SERPL-MCNC: 1.4 NG/ML (ref 0–3.5)
RBC # BLD AUTO: 3.83 MILL/UL (ref 4.4–6.2)
SODIUM SERPL-SCNC: 143 MMOL/L (ref 136–145)
WBC: 10.3 THOU/UL (ref 4–11)

## 2018-10-18 LAB
ALBUMIN PERCENT: 63.3 % (ref 51–67)
ALBUMIN SERPL ELPH-MCNC: 4.1 G/DL (ref 3.2–4.7)
ALPHA 1 PERCENT: 2.4 % (ref 2–4)
ALPHA 2 PERCENT: 10.2 % (ref 5–13)
ALPHA1 GLOB SERPL ELPH-MCNC: 0.2 G/DL (ref 0.1–0.3)
ALPHA2 GLOB SERPL ELPH-MCNC: 0.7 G/DL (ref 0.4–0.9)
B-GLOBULIN SERPL ELPH-MCNC: 0.8 G/DL (ref 0.7–1.2)
BETA PERCENT: 11.6 % (ref 10–17)
GAMMA GLOB SERPL ELPH-MCNC: 0.8 G/DL (ref 0.6–1.4)
GAMMA GLOBULIN PERCENT: 12.5 % (ref 9–20)
KAPPA LC FREE SER-MCNC: 1.15 MG/DL (ref 0.33–1.94)
KAPPA LC FREE/LAMBDA FREE SER NEPH: 1.22 {RATIO} (ref 0.26–1.65)
LAMBDA LC FREE SERPL-MCNC: 0.94 MG/DL (ref 0.57–2.63)
PATH ICD:: NORMAL
PROT PATTERN SERPL ELPH-IMP: NORMAL
PROT SERPL-MCNC: 6.5 G/DL (ref 6–8)
REVIEWING PATHOLOGIST: NORMAL

## 2018-10-22 LAB
PATH ICD:: NORMAL
PROT PATTERN SERPL IFE-IMP: NORMAL
REVIEWING PATHOLOGIST: NORMAL

## 2018-10-24 ENCOUNTER — OFFICE VISIT - HEALTHEAST (OUTPATIENT)
Dept: ONCOLOGY | Facility: HOSPITAL | Age: 58
End: 2018-10-24

## 2018-10-24 ENCOUNTER — INFUSION - HEALTHEAST (OUTPATIENT)
Dept: INFUSION THERAPY | Facility: HOSPITAL | Age: 58
End: 2018-10-24

## 2018-10-24 ENCOUNTER — RECORDS - HEALTHEAST (OUTPATIENT)
Dept: ADMINISTRATIVE | Facility: OTHER | Age: 58
End: 2018-10-24

## 2018-10-24 DIAGNOSIS — C90.00 MULTIPLE MYELOMA NOT HAVING ACHIEVED REMISSION (H): ICD-10-CM

## 2018-10-24 DIAGNOSIS — C61 PROSTATE CANCER METASTATIC TO BONE (H): ICD-10-CM

## 2018-10-24 DIAGNOSIS — C79.51 PROSTATE CANCER METASTATIC TO BONE (H): ICD-10-CM

## 2018-11-01 ENCOUNTER — INFUSION - HEALTHEAST (OUTPATIENT)
Dept: INFUSION THERAPY | Facility: HOSPITAL | Age: 58
End: 2018-11-01

## 2018-11-01 DIAGNOSIS — C90.00 MULTIPLE MYELOMA NOT HAVING ACHIEVED REMISSION (H): ICD-10-CM

## 2018-11-07 ENCOUNTER — INFUSION - HEALTHEAST (OUTPATIENT)
Dept: INFUSION THERAPY | Facility: HOSPITAL | Age: 58
End: 2018-11-07

## 2018-11-07 DIAGNOSIS — C90.00 MULTIPLE MYELOMA NOT HAVING ACHIEVED REMISSION (H): ICD-10-CM

## 2018-11-14 ENCOUNTER — INFUSION - HEALTHEAST (OUTPATIENT)
Dept: INFUSION THERAPY | Facility: HOSPITAL | Age: 58
End: 2018-11-14

## 2018-11-14 ENCOUNTER — AMBULATORY - HEALTHEAST (OUTPATIENT)
Dept: INFUSION THERAPY | Facility: HOSPITAL | Age: 58
End: 2018-11-14

## 2018-11-14 DIAGNOSIS — C90.00 MULTIPLE MYELOMA NOT HAVING ACHIEVED REMISSION (H): ICD-10-CM

## 2018-11-14 LAB
ALBUMIN SERPL-MCNC: 3.9 G/DL (ref 3.5–5)
ALP SERPL-CCNC: 41 U/L (ref 45–120)
ALT SERPL W P-5'-P-CCNC: 22 U/L (ref 0–45)
ANION GAP SERPL CALCULATED.3IONS-SCNC: 8 MMOL/L (ref 5–18)
AST SERPL W P-5'-P-CCNC: 15 U/L (ref 0–40)
BASOPHILS # BLD AUTO: 0 THOU/UL (ref 0–0.2)
BASOPHILS NFR BLD AUTO: 0 % (ref 0–2)
BILIRUB SERPL-MCNC: 0.6 MG/DL (ref 0–1)
BUN SERPL-MCNC: 17 MG/DL (ref 8–22)
CALCIUM SERPL-MCNC: 9.7 MG/DL (ref 8.5–10.5)
CHLORIDE BLD-SCNC: 106 MMOL/L (ref 98–107)
CO2 SERPL-SCNC: 31 MMOL/L (ref 22–31)
CREAT SERPL-MCNC: 0.76 MG/DL (ref 0.7–1.3)
EOSINOPHIL # BLD AUTO: 0.1 THOU/UL (ref 0–0.4)
EOSINOPHIL NFR BLD AUTO: 1 % (ref 0–6)
ERYTHROCYTE [DISTWIDTH] IN BLOOD BY AUTOMATED COUNT: 12.4 % (ref 11–14.5)
GFR SERPL CREATININE-BSD FRML MDRD: >60 ML/MIN/1.73M2
GLUCOSE BLD-MCNC: 97 MG/DL (ref 70–125)
HCT VFR BLD AUTO: 40.4 % (ref 40–54)
HGB BLD-MCNC: 13.7 G/DL (ref 14–18)
IGA SERPL-MCNC: 371 MG/DL (ref 65–400)
IGA SERPL-MCNC: 922 MG/DL
IGM SERPL-MCNC: 35 MG/DL (ref 60–280)
LYMPHOCYTES # BLD AUTO: 1.6 THOU/UL (ref 0.8–4.4)
LYMPHOCYTES NFR BLD AUTO: 16 % (ref 20–40)
MCH RBC QN AUTO: 35 PG (ref 27–34)
MCHC RBC AUTO-ENTMCNC: 33.9 G/DL (ref 32–36)
MCV RBC AUTO: 103 FL (ref 80–100)
MONOCYTES # BLD AUTO: 0.7 THOU/UL (ref 0–0.9)
MONOCYTES NFR BLD AUTO: 7 % (ref 2–10)
NEUTROPHILS # BLD AUTO: 7.4 THOU/UL (ref 2–7.7)
NEUTROPHILS NFR BLD AUTO: 75 % (ref 50–70)
PLATELET # BLD AUTO: 213 THOU/UL (ref 140–440)
PMV BLD AUTO: 11.4 FL (ref 8.5–12.5)
POTASSIUM BLD-SCNC: 3.8 MMOL/L (ref 3.5–5)
PROT SERPL-MCNC: 6.7 G/DL (ref 6–8)
PSA SERPL-MCNC: 1.2 NG/ML (ref 0–3.5)
RBC # BLD AUTO: 3.91 MILL/UL (ref 4.4–6.2)
SODIUM SERPL-SCNC: 145 MMOL/L (ref 136–145)
WBC: 9.8 THOU/UL (ref 4–11)

## 2018-11-16 LAB
ALBUMIN PERCENT: 61.6 % (ref 51–67)
ALBUMIN SERPL ELPH-MCNC: 4.1 G/DL (ref 3.2–4.7)
ALPHA 1 PERCENT: 2.6 % (ref 2–4)
ALPHA 2 PERCENT: 10.8 % (ref 5–13)
ALPHA1 GLOB SERPL ELPH-MCNC: 0.2 G/DL (ref 0.1–0.3)
ALPHA2 GLOB SERPL ELPH-MCNC: 0.7 G/DL (ref 0.4–0.9)
B-GLOBULIN SERPL ELPH-MCNC: 0.8 G/DL (ref 0.7–1.2)
BETA PERCENT: 12.2 % (ref 10–17)
GAMMA GLOB SERPL ELPH-MCNC: 0.8 G/DL (ref 0.6–1.4)
GAMMA GLOBULIN PERCENT: 12.8 % (ref 9–20)
KAPPA LC FREE SER-MCNC: 1 MG/DL (ref 0.33–1.94)
KAPPA LC FREE/LAMBDA FREE SER NEPH: 1.52 {RATIO} (ref 0.26–1.65)
LAMBDA LC FREE SERPL-MCNC: 0.66 MG/DL (ref 0.57–2.63)
PATH ICD:: NORMAL
PATH ICD:: NORMAL
PROT PATTERN SERPL ELPH-IMP: NORMAL
PROT PATTERN SERPL IFE-IMP: NORMAL
PROT SERPL-MCNC: 6.6 G/DL (ref 6–8)
REVIEWING PATHOLOGIST: NORMAL
REVIEWING PATHOLOGIST: NORMAL

## 2018-11-20 ENCOUNTER — INFUSION - HEALTHEAST (OUTPATIENT)
Dept: INFUSION THERAPY | Facility: HOSPITAL | Age: 58
End: 2018-11-20

## 2018-11-20 ENCOUNTER — OFFICE VISIT - HEALTHEAST (OUTPATIENT)
Dept: ONCOLOGY | Facility: HOSPITAL | Age: 58
End: 2018-11-20

## 2018-11-20 ENCOUNTER — RECORDS - HEALTHEAST (OUTPATIENT)
Dept: ADMINISTRATIVE | Facility: OTHER | Age: 58
End: 2018-11-20

## 2018-11-20 ENCOUNTER — AMBULATORY - HEALTHEAST (OUTPATIENT)
Dept: INFUSION THERAPY | Facility: HOSPITAL | Age: 58
End: 2018-11-20

## 2018-11-20 DIAGNOSIS — C90.00 MULTIPLE MYELOMA NOT HAVING ACHIEVED REMISSION (H): ICD-10-CM

## 2018-11-20 DIAGNOSIS — C61 PROSTATE CANCER METASTATIC TO BONE (H): ICD-10-CM

## 2018-11-20 DIAGNOSIS — C79.51 PROSTATE CANCER METASTATIC TO BONE (H): ICD-10-CM

## 2018-11-20 LAB — TSH SERPL DL<=0.005 MIU/L-ACNC: 6.26 UIU/ML (ref 0.3–5)

## 2018-11-20 RX ORDER — ATORVASTATIN CALCIUM 20 MG/1
20 TABLET, FILM COATED ORAL DAILY
Status: SHIPPED | COMMUNITY
Start: 2018-11-20

## 2018-11-20 RX ORDER — TRAZODONE HYDROCHLORIDE 150 MG/1
150 TABLET ORAL AT BEDTIME
Status: SHIPPED | COMMUNITY
Start: 2018-11-20

## 2018-11-20 RX ORDER — ACYCLOVIR 400 MG/1
400 TABLET ORAL 2 TIMES DAILY
Status: SHIPPED | COMMUNITY
Start: 2018-11-20

## 2018-11-20 RX ORDER — DOCUSATE SODIUM 100 MG/1
100 CAPSULE, LIQUID FILLED ORAL 2 TIMES DAILY
Status: SHIPPED | COMMUNITY
Start: 2018-11-20

## 2018-11-20 RX ORDER — VENLAFAXINE HYDROCHLORIDE 75 MG/1
75 CAPSULE, EXTENDED RELEASE ORAL DAILY
Status: SHIPPED | COMMUNITY
Start: 2018-11-20

## 2018-11-20 RX ORDER — LEVOTHYROXINE SODIUM 100 UG/1
100 TABLET ORAL DAILY
Status: SHIPPED | COMMUNITY
Start: 2018-11-20

## 2018-11-28 ENCOUNTER — INFUSION - HEALTHEAST (OUTPATIENT)
Dept: INFUSION THERAPY | Facility: HOSPITAL | Age: 58
End: 2018-11-28

## 2018-11-28 DIAGNOSIS — C90.00 MULTIPLE MYELOMA NOT HAVING ACHIEVED REMISSION (H): ICD-10-CM

## 2018-12-05 ENCOUNTER — INFUSION - HEALTHEAST (OUTPATIENT)
Dept: INFUSION THERAPY | Facility: HOSPITAL | Age: 58
End: 2018-12-05

## 2018-12-05 DIAGNOSIS — C90.00 MULTIPLE MYELOMA NOT HAVING ACHIEVED REMISSION (H): ICD-10-CM

## 2018-12-12 ENCOUNTER — AMBULATORY - HEALTHEAST (OUTPATIENT)
Dept: INFUSION THERAPY | Facility: HOSPITAL | Age: 58
End: 2018-12-12

## 2018-12-12 ENCOUNTER — INFUSION - HEALTHEAST (OUTPATIENT)
Dept: INFUSION THERAPY | Facility: HOSPITAL | Age: 58
End: 2018-12-12

## 2018-12-12 DIAGNOSIS — C90.00 MULTIPLE MYELOMA NOT HAVING ACHIEVED REMISSION (H): ICD-10-CM

## 2018-12-12 LAB
ALBUMIN SERPL-MCNC: 4 G/DL (ref 3.5–5)
ALP SERPL-CCNC: 43 U/L (ref 45–120)
ALT SERPL W P-5'-P-CCNC: 17 U/L (ref 0–45)
ANION GAP SERPL CALCULATED.3IONS-SCNC: 8 MMOL/L (ref 5–18)
AST SERPL W P-5'-P-CCNC: 13 U/L (ref 0–40)
BILIRUB SERPL-MCNC: 0.7 MG/DL (ref 0–1)
BUN SERPL-MCNC: 17 MG/DL (ref 8–22)
CALCIUM SERPL-MCNC: 9.9 MG/DL (ref 8.5–10.5)
CHLORIDE BLD-SCNC: 106 MMOL/L (ref 98–107)
CO2 SERPL-SCNC: 29 MMOL/L (ref 22–31)
CREAT SERPL-MCNC: 0.74 MG/DL (ref 0.7–1.3)
GFR SERPL CREATININE-BSD FRML MDRD: >60 ML/MIN/1.73M2
GLUCOSE BLD-MCNC: 95 MG/DL (ref 70–125)
IGA SERPL-MCNC: 382 MG/DL (ref 65–400)
IGA SERPL-MCNC: 924 MG/DL
IGM SERPL-MCNC: 36 MG/DL (ref 60–280)
POTASSIUM BLD-SCNC: 4.2 MMOL/L (ref 3.5–5)
PROT SERPL-MCNC: 6.9 G/DL (ref 6–8)
PSA SERPL-MCNC: 1 NG/ML (ref 0–3.5)
SODIUM SERPL-SCNC: 143 MMOL/L (ref 136–145)

## 2018-12-12 ASSESSMENT — MIFFLIN-ST. JEOR: SCORE: 1836.25

## 2018-12-13 LAB
KAPPA LC FREE SER-MCNC: 2.39 MG/DL (ref 0.33–1.94)
KAPPA LC FREE/LAMBDA FREE SER NEPH: 1.93 {RATIO} (ref 0.26–1.65)
LAMBDA LC FREE SERPL-MCNC: 1.24 MG/DL (ref 0.57–2.63)

## 2018-12-14 LAB
ALBUMIN PERCENT: 60.1 % (ref 51–67)
ALBUMIN SERPL ELPH-MCNC: 4 G/DL (ref 3.2–4.7)
ALPHA 1 PERCENT: 2.7 % (ref 2–4)
ALPHA 2 PERCENT: 11.6 % (ref 5–13)
ALPHA1 GLOB SERPL ELPH-MCNC: 0.2 G/DL (ref 0.1–0.3)
ALPHA2 GLOB SERPL ELPH-MCNC: 0.8 G/DL (ref 0.4–0.9)
B-GLOBULIN SERPL ELPH-MCNC: 0.8 G/DL (ref 0.7–1.2)
BETA PERCENT: 12.4 % (ref 10–17)
GAMMA GLOB SERPL ELPH-MCNC: 0.9 G/DL (ref 0.6–1.4)
GAMMA GLOBULIN PERCENT: 13.2 % (ref 9–20)
PATH ICD:: NORMAL
PATH ICD:: NORMAL
PROT PATTERN SERPL ELPH-IMP: NORMAL
PROT PATTERN SERPL IFE-IMP: NORMAL
PROT SERPL-MCNC: 6.7 G/DL (ref 6–8)
REVIEWING PATHOLOGIST: NORMAL
REVIEWING PATHOLOGIST: NORMAL

## 2018-12-19 ENCOUNTER — INFUSION - HEALTHEAST (OUTPATIENT)
Dept: INFUSION THERAPY | Facility: HOSPITAL | Age: 58
End: 2018-12-19

## 2018-12-19 ENCOUNTER — RECORDS - HEALTHEAST (OUTPATIENT)
Dept: ADMINISTRATIVE | Facility: OTHER | Age: 58
End: 2018-12-19

## 2018-12-19 ENCOUNTER — OFFICE VISIT - HEALTHEAST (OUTPATIENT)
Dept: ONCOLOGY | Facility: HOSPITAL | Age: 58
End: 2018-12-19

## 2018-12-19 DIAGNOSIS — C90.00 MULTIPLE MYELOMA NOT HAVING ACHIEVED REMISSION (H): ICD-10-CM

## 2018-12-26 ENCOUNTER — INFUSION - HEALTHEAST (OUTPATIENT)
Dept: INFUSION THERAPY | Facility: HOSPITAL | Age: 58
End: 2018-12-26

## 2018-12-26 DIAGNOSIS — C90.00 MULTIPLE MYELOMA NOT HAVING ACHIEVED REMISSION (H): ICD-10-CM

## 2019-01-02 ENCOUNTER — INFUSION - HEALTHEAST (OUTPATIENT)
Dept: INFUSION THERAPY | Facility: HOSPITAL | Age: 59
End: 2019-01-02

## 2019-01-02 DIAGNOSIS — C90.00 MULTIPLE MYELOMA NOT HAVING ACHIEVED REMISSION (H): ICD-10-CM

## 2019-01-09 ENCOUNTER — AMBULATORY - HEALTHEAST (OUTPATIENT)
Dept: INFUSION THERAPY | Facility: HOSPITAL | Age: 59
End: 2019-01-09

## 2019-01-09 ENCOUNTER — INFUSION - HEALTHEAST (OUTPATIENT)
Dept: INFUSION THERAPY | Facility: HOSPITAL | Age: 59
End: 2019-01-09

## 2019-01-09 DIAGNOSIS — C90.00 MULTIPLE MYELOMA NOT HAVING ACHIEVED REMISSION (H): ICD-10-CM

## 2019-01-09 LAB
ALBUMIN SERPL-MCNC: 3.8 G/DL (ref 3.5–5)
ALP SERPL-CCNC: 42 U/L (ref 45–120)
ALT SERPL W P-5'-P-CCNC: 16 U/L (ref 0–45)
ANION GAP SERPL CALCULATED.3IONS-SCNC: 7 MMOL/L (ref 5–18)
AST SERPL W P-5'-P-CCNC: 13 U/L (ref 0–40)
BASOPHILS # BLD AUTO: 0 THOU/UL (ref 0–0.2)
BASOPHILS NFR BLD AUTO: 0 % (ref 0–2)
BILIRUB SERPL-MCNC: 0.6 MG/DL (ref 0–1)
BUN SERPL-MCNC: 17 MG/DL (ref 8–22)
CALCIUM SERPL-MCNC: 9.6 MG/DL (ref 8.5–10.5)
CHLORIDE BLD-SCNC: 105 MMOL/L (ref 98–107)
CO2 SERPL-SCNC: 31 MMOL/L (ref 22–31)
CREAT SERPL-MCNC: 0.75 MG/DL (ref 0.7–1.3)
EOSINOPHIL # BLD AUTO: 0.2 THOU/UL (ref 0–0.4)
EOSINOPHIL NFR BLD AUTO: 2 % (ref 0–6)
ERYTHROCYTE [DISTWIDTH] IN BLOOD BY AUTOMATED COUNT: 12.7 % (ref 11–14.5)
GFR SERPL CREATININE-BSD FRML MDRD: >60 ML/MIN/1.73M2
GLUCOSE BLD-MCNC: 92 MG/DL (ref 70–125)
HCT VFR BLD AUTO: 37.8 % (ref 40–54)
HGB BLD-MCNC: 12.7 G/DL (ref 14–18)
IGA SERPL-MCNC: 358 MG/DL (ref 65–400)
IGA SERPL-MCNC: 885 MG/DL
IGM SERPL-MCNC: 36 MG/DL (ref 60–280)
LYMPHOCYTES # BLD AUTO: 1.9 THOU/UL (ref 0.8–4.4)
LYMPHOCYTES NFR BLD AUTO: 22 % (ref 20–40)
MCH RBC QN AUTO: 34.9 PG (ref 27–34)
MCHC RBC AUTO-ENTMCNC: 33.6 G/DL (ref 32–36)
MCV RBC AUTO: 104 FL (ref 80–100)
MONOCYTES # BLD AUTO: 0.8 THOU/UL (ref 0–0.9)
MONOCYTES NFR BLD AUTO: 9 % (ref 2–10)
NEUTROPHILS # BLD AUTO: 6 THOU/UL (ref 2–7.7)
NEUTROPHILS NFR BLD AUTO: 67 % (ref 50–70)
PLATELET # BLD AUTO: 248 THOU/UL (ref 140–440)
PMV BLD AUTO: 10.8 FL (ref 8.5–12.5)
POTASSIUM BLD-SCNC: 3.9 MMOL/L (ref 3.5–5)
PROT SERPL-MCNC: 6.7 G/DL (ref 6–8)
RBC # BLD AUTO: 3.64 MILL/UL (ref 4.4–6.2)
SODIUM SERPL-SCNC: 143 MMOL/L (ref 136–145)
WBC: 8.9 THOU/UL (ref 4–11)

## 2019-01-10 LAB
KAPPA LC FREE SER-MCNC: 1.42 MG/DL (ref 0.33–1.94)
KAPPA LC FREE/LAMBDA FREE SER NEPH: 1.16 {RATIO} (ref 0.26–1.65)
LAMBDA LC FREE SERPL-MCNC: 1.22 MG/DL (ref 0.57–2.63)

## 2019-01-11 LAB
ALBUMIN PERCENT: 60.3 % (ref 51–67)
ALBUMIN SERPL ELPH-MCNC: 3.9 G/DL (ref 3.2–4.7)
ALPHA 1 PERCENT: 2.6 % (ref 2–4)
ALPHA 2 PERCENT: 11.2 % (ref 5–13)
ALPHA1 GLOB SERPL ELPH-MCNC: 0.2 G/DL (ref 0.1–0.3)
ALPHA2 GLOB SERPL ELPH-MCNC: 0.7 G/DL (ref 0.4–0.9)
B-GLOBULIN SERPL ELPH-MCNC: 0.8 G/DL (ref 0.7–1.2)
BETA PERCENT: 12.4 % (ref 10–17)
GAMMA GLOB SERPL ELPH-MCNC: 0.9 G/DL (ref 0.6–1.4)
GAMMA GLOBULIN PERCENT: 13.5 % (ref 9–20)
PATH ICD:: NORMAL
PATH ICD:: NORMAL
PROT PATTERN SERPL ELPH-IMP: NORMAL
PROT PATTERN SERPL IFE-IMP: NORMAL
PROT SERPL-MCNC: 6.4 G/DL (ref 6–8)
REVIEWING PATHOLOGIST: NORMAL
REVIEWING PATHOLOGIST: NORMAL

## 2019-01-16 ENCOUNTER — INFUSION - HEALTHEAST (OUTPATIENT)
Dept: INFUSION THERAPY | Facility: HOSPITAL | Age: 59
End: 2019-01-16

## 2019-01-16 ENCOUNTER — RECORDS - HEALTHEAST (OUTPATIENT)
Dept: ADMINISTRATIVE | Facility: OTHER | Age: 59
End: 2019-01-16

## 2019-01-16 ENCOUNTER — OFFICE VISIT - HEALTHEAST (OUTPATIENT)
Dept: ONCOLOGY | Facility: HOSPITAL | Age: 59
End: 2019-01-16

## 2019-01-16 ENCOUNTER — AMBULATORY - HEALTHEAST (OUTPATIENT)
Dept: INFUSION THERAPY | Facility: HOSPITAL | Age: 59
End: 2019-01-16

## 2019-01-16 DIAGNOSIS — C90.00 MULTIPLE MYELOMA NOT HAVING ACHIEVED REMISSION (H): ICD-10-CM

## 2019-01-16 DIAGNOSIS — C79.51 PROSTATE CANCER METASTATIC TO BONE (H): ICD-10-CM

## 2019-01-16 DIAGNOSIS — C61 PROSTATE CANCER METASTATIC TO BONE (H): ICD-10-CM

## 2019-01-23 ENCOUNTER — INFUSION - HEALTHEAST (OUTPATIENT)
Dept: INFUSION THERAPY | Facility: HOSPITAL | Age: 59
End: 2019-01-23

## 2019-01-23 ENCOUNTER — AMBULATORY - HEALTHEAST (OUTPATIENT)
Dept: ONCOLOGY | Facility: HOSPITAL | Age: 59
End: 2019-01-23

## 2019-01-23 DIAGNOSIS — C90.00 MULTIPLE MYELOMA NOT HAVING ACHIEVED REMISSION (H): ICD-10-CM

## 2019-01-23 DIAGNOSIS — E06.5 THYROIDITIS, CHRONIC: ICD-10-CM

## 2019-01-30 ENCOUNTER — INFUSION - HEALTHEAST (OUTPATIENT)
Dept: INFUSION THERAPY | Facility: HOSPITAL | Age: 59
End: 2019-01-30

## 2019-01-30 DIAGNOSIS — C90.00 MULTIPLE MYELOMA NOT HAVING ACHIEVED REMISSION (H): ICD-10-CM

## 2019-02-06 ENCOUNTER — AMBULATORY - HEALTHEAST (OUTPATIENT)
Dept: INFUSION THERAPY | Facility: HOSPITAL | Age: 59
End: 2019-02-06

## 2019-02-06 ENCOUNTER — INFUSION - HEALTHEAST (OUTPATIENT)
Dept: INFUSION THERAPY | Facility: HOSPITAL | Age: 59
End: 2019-02-06

## 2019-02-06 DIAGNOSIS — C90.00 MULTIPLE MYELOMA NOT HAVING ACHIEVED REMISSION (H): ICD-10-CM

## 2019-02-06 LAB
ALBUMIN SERPL-MCNC: 3.8 G/DL (ref 3.5–5)
ALP SERPL-CCNC: 43 U/L (ref 45–120)
ALT SERPL W P-5'-P-CCNC: 20 U/L (ref 0–45)
ANION GAP SERPL CALCULATED.3IONS-SCNC: 6 MMOL/L (ref 5–18)
AST SERPL W P-5'-P-CCNC: 15 U/L (ref 0–40)
BASOPHILS # BLD AUTO: 0.1 THOU/UL (ref 0–0.2)
BASOPHILS NFR BLD AUTO: 1 % (ref 0–2)
BILIRUB SERPL-MCNC: 0.7 MG/DL (ref 0–1)
BUN SERPL-MCNC: 21 MG/DL (ref 8–22)
CALCIUM SERPL-MCNC: 9.5 MG/DL (ref 8.5–10.5)
CHLORIDE BLD-SCNC: 107 MMOL/L (ref 98–107)
CO2 SERPL-SCNC: 29 MMOL/L (ref 22–31)
CREAT SERPL-MCNC: 0.76 MG/DL (ref 0.7–1.3)
EOSINOPHIL # BLD AUTO: 0.1 THOU/UL (ref 0–0.4)
EOSINOPHIL NFR BLD AUTO: 1 % (ref 0–6)
ERYTHROCYTE [DISTWIDTH] IN BLOOD BY AUTOMATED COUNT: 12.6 % (ref 11–14.5)
GFR SERPL CREATININE-BSD FRML MDRD: >60 ML/MIN/1.73M2
GLUCOSE BLD-MCNC: 105 MG/DL (ref 70–125)
HCT VFR BLD AUTO: 37.8 % (ref 40–54)
HGB BLD-MCNC: 12.6 G/DL (ref 14–18)
IGA SERPL-MCNC: 372 MG/DL (ref 65–400)
IGA SERPL-MCNC: 896 MG/DL
IGM SERPL-MCNC: 41 MG/DL (ref 60–280)
LYMPHOCYTES # BLD AUTO: 1.2 THOU/UL (ref 0.8–4.4)
LYMPHOCYTES NFR BLD AUTO: 11 % (ref 20–40)
MCH RBC QN AUTO: 35.2 PG (ref 27–34)
MCHC RBC AUTO-ENTMCNC: 33.3 G/DL (ref 32–36)
MCV RBC AUTO: 106 FL (ref 80–100)
MONOCYTES # BLD AUTO: 0.6 THOU/UL (ref 0–0.9)
MONOCYTES NFR BLD AUTO: 5 % (ref 2–10)
NEUTROPHILS # BLD AUTO: 8.9 THOU/UL (ref 2–7.7)
NEUTROPHILS NFR BLD AUTO: 82 % (ref 50–70)
PLATELET # BLD AUTO: 271 THOU/UL (ref 140–440)
PMV BLD AUTO: 10.7 FL (ref 8.5–12.5)
POTASSIUM BLD-SCNC: 3.9 MMOL/L (ref 3.5–5)
PROT SERPL-MCNC: 6.8 G/DL (ref 6–8)
RBC # BLD AUTO: 3.58 MILL/UL (ref 4.4–6.2)
SODIUM SERPL-SCNC: 142 MMOL/L (ref 136–145)
WBC: 10.9 THOU/UL (ref 4–11)

## 2019-02-07 LAB
KAPPA LC FREE SER-MCNC: 1.41 MG/DL (ref 0.33–1.94)
KAPPA LC FREE/LAMBDA FREE SER NEPH: 1.18 {RATIO} (ref 0.26–1.65)
LAMBDA LC FREE SERPL-MCNC: 1.2 MG/DL (ref 0.57–2.63)

## 2019-02-08 LAB
PATH ICD:: NORMAL
PROT PATTERN SERPL IFE-IMP: NORMAL
REVIEWING PATHOLOGIST: NORMAL

## 2019-02-11 LAB
ALBUMIN PERCENT: 60.1 % (ref 51–67)
ALBUMIN SERPL ELPH-MCNC: 3.8 G/DL (ref 3.2–4.7)
ALPHA 1 PERCENT: 2.6 % (ref 2–4)
ALPHA 2 PERCENT: 11.5 % (ref 5–13)
ALPHA1 GLOB SERPL ELPH-MCNC: 0.2 G/DL (ref 0.1–0.3)
ALPHA2 GLOB SERPL ELPH-MCNC: 0.7 G/DL (ref 0.4–0.9)
B-GLOBULIN SERPL ELPH-MCNC: 0.8 G/DL (ref 0.7–1.2)
BETA PERCENT: 12.6 % (ref 10–17)
GAMMA GLOB SERPL ELPH-MCNC: 0.8 G/DL (ref 0.6–1.4)
GAMMA GLOBULIN PERCENT: 13.2 % (ref 9–20)
PATH ICD:: NORMAL
PROT PATTERN SERPL ELPH-IMP: NORMAL
PROT SERPL-MCNC: 6.4 G/DL (ref 6–8)
REVIEWING PATHOLOGIST: NORMAL

## 2019-02-12 ENCOUNTER — RECORDS - HEALTHEAST (OUTPATIENT)
Dept: ADMINISTRATIVE | Facility: OTHER | Age: 59
End: 2019-02-12

## 2019-02-13 ENCOUNTER — INFUSION - HEALTHEAST (OUTPATIENT)
Dept: INFUSION THERAPY | Facility: HOSPITAL | Age: 59
End: 2019-02-13

## 2019-02-13 ENCOUNTER — AMBULATORY - HEALTHEAST (OUTPATIENT)
Dept: INFUSION THERAPY | Facility: HOSPITAL | Age: 59
End: 2019-02-13

## 2019-02-13 ENCOUNTER — RECORDS - HEALTHEAST (OUTPATIENT)
Dept: ADMINISTRATIVE | Facility: OTHER | Age: 59
End: 2019-02-13

## 2019-02-13 ENCOUNTER — OFFICE VISIT - HEALTHEAST (OUTPATIENT)
Dept: ONCOLOGY | Facility: HOSPITAL | Age: 59
End: 2019-02-13

## 2019-02-13 DIAGNOSIS — C61 PROSTATE CANCER METASTATIC TO BONE (H): ICD-10-CM

## 2019-02-13 DIAGNOSIS — C90.00 MULTIPLE MYELOMA NOT HAVING ACHIEVED REMISSION (H): ICD-10-CM

## 2019-02-13 DIAGNOSIS — C79.51 PROSTATE CANCER METASTATIC TO BONE (H): ICD-10-CM

## 2019-02-13 LAB — TSH SERPL DL<=0.005 MIU/L-ACNC: 6 UIU/ML (ref 0.3–5)

## 2019-02-20 ENCOUNTER — INFUSION - HEALTHEAST (OUTPATIENT)
Dept: INFUSION THERAPY | Facility: HOSPITAL | Age: 59
End: 2019-02-20

## 2019-02-20 DIAGNOSIS — C90.00 MULTIPLE MYELOMA NOT HAVING ACHIEVED REMISSION (H): ICD-10-CM

## 2019-02-27 ENCOUNTER — INFUSION - HEALTHEAST (OUTPATIENT)
Dept: INFUSION THERAPY | Facility: HOSPITAL | Age: 59
End: 2019-02-27

## 2019-02-27 DIAGNOSIS — C90.00 MULTIPLE MYELOMA NOT HAVING ACHIEVED REMISSION (H): ICD-10-CM

## 2019-03-06 ENCOUNTER — INFUSION - HEALTHEAST (OUTPATIENT)
Dept: INFUSION THERAPY | Facility: HOSPITAL | Age: 59
End: 2019-03-06

## 2019-03-06 ENCOUNTER — AMBULATORY - HEALTHEAST (OUTPATIENT)
Dept: INFUSION THERAPY | Facility: HOSPITAL | Age: 59
End: 2019-03-06

## 2019-03-06 DIAGNOSIS — C90.00 MULTIPLE MYELOMA NOT HAVING ACHIEVED REMISSION (H): ICD-10-CM

## 2019-03-06 LAB
ALBUMIN SERPL-MCNC: 2.5 G/DL (ref 3.5–5)
ALP SERPL-CCNC: 61 U/L (ref 45–120)
ALT SERPL W P-5'-P-CCNC: 11 U/L (ref 0–45)
ANION GAP SERPL CALCULATED.3IONS-SCNC: 9 MMOL/L (ref 5–18)
AST SERPL W P-5'-P-CCNC: 12 U/L (ref 0–40)
BASOPHILS # BLD AUTO: 0 THOU/UL (ref 0–0.2)
BASOPHILS NFR BLD AUTO: 0 % (ref 0–2)
BILIRUB SERPL-MCNC: 0.3 MG/DL (ref 0–1)
BUN SERPL-MCNC: 11 MG/DL (ref 8–22)
CALCIUM SERPL-MCNC: 9.9 MG/DL (ref 8.5–10.5)
CHLORIDE BLD-SCNC: 105 MMOL/L (ref 98–107)
CO2 SERPL-SCNC: 29 MMOL/L (ref 22–31)
CREAT SERPL-MCNC: 0.85 MG/DL (ref 0.7–1.3)
EOSINOPHIL # BLD AUTO: 0.3 THOU/UL (ref 0–0.4)
EOSINOPHIL NFR BLD AUTO: 2 % (ref 0–6)
ERYTHROCYTE [DISTWIDTH] IN BLOOD BY AUTOMATED COUNT: 12.5 % (ref 11–14.5)
GFR SERPL CREATININE-BSD FRML MDRD: >60 ML/MIN/1.73M2
GLUCOSE BLD-MCNC: 103 MG/DL (ref 70–125)
HCT VFR BLD AUTO: 32.2 % (ref 40–54)
HGB BLD-MCNC: 10.6 G/DL (ref 14–18)
IGA SERPL-MCNC: 435 MG/DL (ref 65–400)
IGA SERPL-MCNC: 826 MG/DL
IGM SERPL-MCNC: 35 MG/DL (ref 60–280)
LYMPHOCYTES # BLD AUTO: 1.4 THOU/UL (ref 0.8–4.4)
LYMPHOCYTES NFR BLD AUTO: 9 % (ref 20–40)
MCH RBC QN AUTO: 33.8 PG (ref 27–34)
MCHC RBC AUTO-ENTMCNC: 32.9 G/DL (ref 32–36)
MCV RBC AUTO: 103 FL (ref 80–100)
MONOCYTES # BLD AUTO: 1.7 THOU/UL (ref 0–0.9)
MONOCYTES NFR BLD AUTO: 11 % (ref 2–10)
NEUTROPHILS # BLD AUTO: 11.8 THOU/UL (ref 2–7.7)
NEUTROPHILS NFR BLD AUTO: 78 % (ref 50–70)
PLATELET # BLD AUTO: 559 THOU/UL (ref 140–440)
PMV BLD AUTO: 9.9 FL (ref 8.5–12.5)
POTASSIUM BLD-SCNC: 3.9 MMOL/L (ref 3.5–5)
PROT SERPL-MCNC: 6.9 G/DL (ref 6–8)
RBC # BLD AUTO: 3.14 MILL/UL (ref 4.4–6.2)
SODIUM SERPL-SCNC: 143 MMOL/L (ref 136–145)
WBC: 15.6 THOU/UL (ref 4–11)

## 2019-03-07 LAB
KAPPA LC FREE SER-MCNC: 1.52 MG/DL (ref 0.33–1.94)
KAPPA LC FREE/LAMBDA FREE SER NEPH: 1.11 {RATIO} (ref 0.26–1.65)
LAMBDA LC FREE SERPL-MCNC: 1.37 MG/DL (ref 0.57–2.63)

## 2019-03-08 LAB
ALBUMIN PERCENT: 43.3 % (ref 51–67)
ALBUMIN SERPL ELPH-MCNC: 2.6 G/DL (ref 3.2–4.7)
ALPHA 1 PERCENT: 6.5 % (ref 2–4)
ALPHA 2 PERCENT: 18.4 % (ref 5–13)
ALPHA1 GLOB SERPL ELPH-MCNC: 0.4 G/DL (ref 0.1–0.3)
ALPHA2 GLOB SERPL ELPH-MCNC: 1.1 G/DL (ref 0.4–0.9)
B-GLOBULIN SERPL ELPH-MCNC: 0.9 G/DL (ref 0.7–1.2)
BETA PERCENT: 15.6 % (ref 10–17)
GAMMA GLOB SERPL ELPH-MCNC: 1 G/DL (ref 0.6–1.4)
GAMMA GLOBULIN PERCENT: 16.2 % (ref 9–20)
PATH ICD:: ABNORMAL
PATH ICD:: NORMAL
PROT PATTERN SERPL ELPH-IMP: ABNORMAL
PROT PATTERN SERPL IFE-IMP: NORMAL
PROT SERPL-MCNC: 6 G/DL (ref 6–8)
REVIEWING PATHOLOGIST: ABNORMAL
REVIEWING PATHOLOGIST: NORMAL

## 2019-03-13 ENCOUNTER — AMBULATORY - HEALTHEAST (OUTPATIENT)
Dept: ONCOLOGY | Facility: HOSPITAL | Age: 59
End: 2019-03-13

## 2019-03-13 ENCOUNTER — INFUSION - HEALTHEAST (OUTPATIENT)
Dept: INFUSION THERAPY | Facility: HOSPITAL | Age: 59
End: 2019-03-13

## 2019-03-13 DIAGNOSIS — C90.00 MULTIPLE MYELOMA NOT HAVING ACHIEVED REMISSION (H): ICD-10-CM

## 2019-03-13 RX ORDER — ONDANSETRON 8 MG/1
8 TABLET, FILM COATED ORAL EVERY 8 HOURS PRN
Status: SHIPPED | COMMUNITY
Start: 2019-03-13

## 2019-03-20 ENCOUNTER — INFUSION - HEALTHEAST (OUTPATIENT)
Dept: INFUSION THERAPY | Facility: HOSPITAL | Age: 59
End: 2019-03-20

## 2019-03-20 DIAGNOSIS — C90.00 MULTIPLE MYELOMA NOT HAVING ACHIEVED REMISSION (H): ICD-10-CM

## 2019-03-27 ENCOUNTER — INFUSION - HEALTHEAST (OUTPATIENT)
Dept: INFUSION THERAPY | Facility: HOSPITAL | Age: 59
End: 2019-03-27

## 2019-03-27 DIAGNOSIS — C90.00 MULTIPLE MYELOMA NOT HAVING ACHIEVED REMISSION (H): ICD-10-CM

## 2019-04-03 ENCOUNTER — AMBULATORY - HEALTHEAST (OUTPATIENT)
Dept: INFUSION THERAPY | Facility: HOSPITAL | Age: 59
End: 2019-04-03

## 2019-04-03 ENCOUNTER — INFUSION - HEALTHEAST (OUTPATIENT)
Dept: INFUSION THERAPY | Facility: HOSPITAL | Age: 59
End: 2019-04-03

## 2019-04-03 DIAGNOSIS — C90.00 MULTIPLE MYELOMA NOT HAVING ACHIEVED REMISSION (H): ICD-10-CM

## 2019-04-03 LAB
ALBUMIN SERPL-MCNC: 3.3 G/DL (ref 3.5–5)
ALP SERPL-CCNC: 51 U/L (ref 45–120)
ALT SERPL W P-5'-P-CCNC: 16 U/L (ref 0–45)
ANION GAP SERPL CALCULATED.3IONS-SCNC: 7 MMOL/L (ref 5–18)
AST SERPL W P-5'-P-CCNC: 15 U/L (ref 0–40)
BASOPHILS # BLD AUTO: 0.1 THOU/UL (ref 0–0.2)
BASOPHILS NFR BLD AUTO: 1 % (ref 0–2)
BILIRUB SERPL-MCNC: 0.4 MG/DL (ref 0–1)
BUN SERPL-MCNC: 12 MG/DL (ref 8–22)
CALCIUM SERPL-MCNC: 9.9 MG/DL (ref 8.5–10.5)
CHLORIDE BLD-SCNC: 107 MMOL/L (ref 98–107)
CO2 SERPL-SCNC: 29 MMOL/L (ref 22–31)
CREAT SERPL-MCNC: 0.76 MG/DL (ref 0.7–1.3)
EOSINOPHIL # BLD AUTO: 0.4 THOU/UL (ref 0–0.4)
EOSINOPHIL NFR BLD AUTO: 5 % (ref 0–6)
ERYTHROCYTE [DISTWIDTH] IN BLOOD BY AUTOMATED COUNT: 13.9 % (ref 11–14.5)
GFR SERPL CREATININE-BSD FRML MDRD: >60 ML/MIN/1.73M2
GLUCOSE BLD-MCNC: 102 MG/DL (ref 70–125)
HCT VFR BLD AUTO: 34.5 % (ref 40–54)
HGB BLD-MCNC: 11.1 G/DL (ref 14–18)
LYMPHOCYTES # BLD AUTO: 1.4 THOU/UL (ref 0.8–4.4)
LYMPHOCYTES NFR BLD AUTO: 17 % (ref 20–40)
MCH RBC QN AUTO: 32.6 PG (ref 27–34)
MCHC RBC AUTO-ENTMCNC: 32.2 G/DL (ref 32–36)
MCV RBC AUTO: 102 FL (ref 80–100)
MONOCYTES # BLD AUTO: 0.8 THOU/UL (ref 0–0.9)
MONOCYTES NFR BLD AUTO: 10 % (ref 2–10)
NEUTROPHILS # BLD AUTO: 5.8 THOU/UL (ref 2–7.7)
NEUTROPHILS NFR BLD AUTO: 68 % (ref 50–70)
PLATELET # BLD AUTO: 304 THOU/UL (ref 140–440)
PMV BLD AUTO: 10.7 FL (ref 8.5–12.5)
POTASSIUM BLD-SCNC: 3.9 MMOL/L (ref 3.5–5)
PROT SERPL-MCNC: 6.6 G/DL (ref 6–8)
RBC # BLD AUTO: 3.4 MILL/UL (ref 4.4–6.2)
SODIUM SERPL-SCNC: 143 MMOL/L (ref 136–145)
WBC: 8.5 THOU/UL (ref 4–11)

## 2019-04-10 ENCOUNTER — AMBULATORY - HEALTHEAST (OUTPATIENT)
Dept: INFUSION THERAPY | Facility: HOSPITAL | Age: 59
End: 2019-04-10

## 2019-04-10 ENCOUNTER — INFUSION - HEALTHEAST (OUTPATIENT)
Dept: INFUSION THERAPY | Facility: HOSPITAL | Age: 59
End: 2019-04-10

## 2019-04-10 DIAGNOSIS — C90.00 MULTIPLE MYELOMA NOT HAVING ACHIEVED REMISSION (H): ICD-10-CM

## 2019-04-10 DIAGNOSIS — C79.51 PROSTATE CANCER METASTATIC TO BONE (H): ICD-10-CM

## 2019-04-10 DIAGNOSIS — C61 PROSTATE CANCER METASTATIC TO BONE (H): ICD-10-CM

## 2019-04-10 LAB — PSA SERPL-MCNC: 0.6 NG/ML (ref 0–3.5)

## 2019-04-17 ENCOUNTER — INFUSION - HEALTHEAST (OUTPATIENT)
Dept: INFUSION THERAPY | Facility: HOSPITAL | Age: 59
End: 2019-04-17

## 2019-04-17 DIAGNOSIS — C90.00 MULTIPLE MYELOMA NOT HAVING ACHIEVED REMISSION (H): ICD-10-CM

## 2019-04-24 ENCOUNTER — INFUSION - HEALTHEAST (OUTPATIENT)
Dept: INFUSION THERAPY | Facility: HOSPITAL | Age: 59
End: 2019-04-24

## 2019-04-24 DIAGNOSIS — C90.00 MULTIPLE MYELOMA NOT HAVING ACHIEVED REMISSION (H): ICD-10-CM

## 2019-05-01 ENCOUNTER — AMBULATORY - HEALTHEAST (OUTPATIENT)
Dept: INFUSION THERAPY | Facility: HOSPITAL | Age: 59
End: 2019-05-01

## 2019-05-01 ENCOUNTER — INFUSION - HEALTHEAST (OUTPATIENT)
Dept: INFUSION THERAPY | Facility: HOSPITAL | Age: 59
End: 2019-05-01

## 2019-05-01 DIAGNOSIS — C90.00 MULTIPLE MYELOMA NOT HAVING ACHIEVED REMISSION (H): ICD-10-CM

## 2019-05-01 DIAGNOSIS — E06.5 THYROIDITIS, CHRONIC: ICD-10-CM

## 2019-05-01 LAB
ALBUMIN SERPL-MCNC: 3.6 G/DL (ref 3.5–5)
ALP SERPL-CCNC: 45 U/L (ref 45–120)
ALT SERPL W P-5'-P-CCNC: 12 U/L (ref 0–45)
ANION GAP SERPL CALCULATED.3IONS-SCNC: 8 MMOL/L (ref 5–18)
AST SERPL W P-5'-P-CCNC: 15 U/L (ref 0–40)
BASOPHILS # BLD AUTO: 0 THOU/UL (ref 0–0.2)
BASOPHILS NFR BLD AUTO: 0 % (ref 0–2)
BILIRUB SERPL-MCNC: 0.8 MG/DL (ref 0–1)
BUN SERPL-MCNC: 12 MG/DL (ref 8–22)
CALCIUM SERPL-MCNC: 9.8 MG/DL (ref 8.5–10.5)
CHLORIDE BLD-SCNC: 105 MMOL/L (ref 98–107)
CO2 SERPL-SCNC: 29 MMOL/L (ref 22–31)
CREAT SERPL-MCNC: 0.71 MG/DL (ref 0.7–1.3)
EOSINOPHIL # BLD AUTO: 0.4 THOU/UL (ref 0–0.4)
EOSINOPHIL NFR BLD AUTO: 4 % (ref 0–6)
ERYTHROCYTE [DISTWIDTH] IN BLOOD BY AUTOMATED COUNT: 14 % (ref 11–14.5)
GFR SERPL CREATININE-BSD FRML MDRD: >60 ML/MIN/1.73M2
GLUCOSE BLD-MCNC: 100 MG/DL (ref 70–125)
HCT VFR BLD AUTO: 37.5 % (ref 40–54)
HGB BLD-MCNC: 12.3 G/DL (ref 14–18)
IGA SERPL-MCNC: 421 MG/DL (ref 65–400)
IGA SERPL-MCNC: 889 MG/DL
IGM SERPL-MCNC: 40 MG/DL (ref 60–280)
LYMPHOCYTES # BLD AUTO: 1.2 THOU/UL (ref 0.8–4.4)
LYMPHOCYTES NFR BLD AUTO: 13 % (ref 20–40)
MCH RBC QN AUTO: 33 PG (ref 27–34)
MCHC RBC AUTO-ENTMCNC: 32.8 G/DL (ref 32–36)
MCV RBC AUTO: 101 FL (ref 80–100)
MONOCYTES # BLD AUTO: 0.7 THOU/UL (ref 0–0.9)
MONOCYTES NFR BLD AUTO: 7 % (ref 2–10)
NEUTROPHILS # BLD AUTO: 7.3 THOU/UL (ref 2–7.7)
NEUTROPHILS NFR BLD AUTO: 76 % (ref 50–70)
PLATELET # BLD AUTO: 259 THOU/UL (ref 140–440)
PMV BLD AUTO: 11.4 FL (ref 8.5–12.5)
POTASSIUM BLD-SCNC: 3.8 MMOL/L (ref 3.5–5)
PROT SERPL-MCNC: 6.5 G/DL (ref 6–8)
RBC # BLD AUTO: 3.73 MILL/UL (ref 4.4–6.2)
SODIUM SERPL-SCNC: 142 MMOL/L (ref 136–145)
T4 FREE SERPL-MCNC: 1 NG/DL (ref 0.7–1.8)
TSH SERPL DL<=0.005 MIU/L-ACNC: 6.69 UIU/ML (ref 0.3–5)
WBC: 9.6 THOU/UL (ref 4–11)

## 2019-05-01 ASSESSMENT — MIFFLIN-ST. JEOR: SCORE: 1854.52

## 2019-05-02 LAB
KAPPA LC FREE SER-MCNC: 1.19 MG/DL (ref 0.33–1.94)
KAPPA LC FREE/LAMBDA FREE SER NEPH: 1.25 {RATIO} (ref 0.26–1.65)
LAMBDA LC FREE SERPL-MCNC: 0.95 MG/DL (ref 0.57–2.63)

## 2019-05-03 LAB
ALBUMIN PERCENT: 61.5 % (ref 51–67)
ALBUMIN SERPL ELPH-MCNC: 4 G/DL (ref 3.2–4.7)
ALPHA 1 PERCENT: 2.7 % (ref 2–4)
ALPHA 2 PERCENT: 10.6 % (ref 5–13)
ALPHA1 GLOB SERPL ELPH-MCNC: 0.2 G/DL (ref 0.1–0.3)
ALPHA2 GLOB SERPL ELPH-MCNC: 0.7 G/DL (ref 0.4–0.9)
B-GLOBULIN SERPL ELPH-MCNC: 0.8 G/DL (ref 0.7–1.2)
BETA PERCENT: 11.9 % (ref 10–17)
GAMMA GLOB SERPL ELPH-MCNC: 0.9 G/DL (ref 0.6–1.4)
GAMMA GLOBULIN PERCENT: 13.3 % (ref 9–20)
PATH ICD:: NORMAL
PATH ICD:: NORMAL
PROT PATTERN SERPL ELPH-IMP: NORMAL
PROT PATTERN SERPL IFE-IMP: NORMAL
PROT SERPL-MCNC: 6.5 G/DL (ref 6–8)
REVIEWING PATHOLOGIST: NORMAL
REVIEWING PATHOLOGIST: NORMAL

## 2019-05-08 ENCOUNTER — AMBULATORY - HEALTHEAST (OUTPATIENT)
Dept: INFUSION THERAPY | Facility: HOSPITAL | Age: 59
End: 2019-05-08

## 2019-05-08 ENCOUNTER — OFFICE VISIT - HEALTHEAST (OUTPATIENT)
Dept: ONCOLOGY | Facility: HOSPITAL | Age: 59
End: 2019-05-08

## 2019-05-08 ENCOUNTER — INFUSION - HEALTHEAST (OUTPATIENT)
Dept: INFUSION THERAPY | Facility: HOSPITAL | Age: 59
End: 2019-05-08

## 2019-05-08 DIAGNOSIS — C79.51 PROSTATE CANCER METASTATIC TO BONE (H): ICD-10-CM

## 2019-05-08 DIAGNOSIS — M51.369 DEGENERATION OF LUMBAR INTERVERTEBRAL DISC: ICD-10-CM

## 2019-05-08 DIAGNOSIS — C90.00 MULTIPLE MYELOMA NOT HAVING ACHIEVED REMISSION (H): ICD-10-CM

## 2019-05-08 DIAGNOSIS — C61 PROSTATE CANCER METASTATIC TO BONE (H): ICD-10-CM

## 2019-05-08 LAB — TSH SERPL DL<=0.005 MIU/L-ACNC: 4.82 UIU/ML (ref 0.3–5)

## 2019-05-08 RX ORDER — LORATADINE 10 MG/1
10 TABLET ORAL DAILY
Status: SHIPPED | COMMUNITY
Start: 2019-05-08

## 2019-05-15 ENCOUNTER — INFUSION - HEALTHEAST (OUTPATIENT)
Dept: INFUSION THERAPY | Facility: HOSPITAL | Age: 59
End: 2019-05-15

## 2019-05-15 DIAGNOSIS — C90.00 MULTIPLE MYELOMA NOT HAVING ACHIEVED REMISSION (H): ICD-10-CM

## 2019-05-16 ENCOUNTER — RECORDS - HEALTHEAST (OUTPATIENT)
Dept: ADMINISTRATIVE | Facility: OTHER | Age: 59
End: 2019-05-16

## 2019-05-17 LAB — HBA1C MFR BLD: 5.7 % (ref 0–5.6)

## 2019-05-21 ENCOUNTER — RECORDS - HEALTHEAST (OUTPATIENT)
Dept: HEALTH INFORMATION MANAGEMENT | Facility: CLINIC | Age: 59
End: 2019-05-21

## 2019-05-22 ENCOUNTER — INFUSION - HEALTHEAST (OUTPATIENT)
Dept: INFUSION THERAPY | Facility: HOSPITAL | Age: 59
End: 2019-05-22

## 2019-05-22 DIAGNOSIS — C90.00 MULTIPLE MYELOMA NOT HAVING ACHIEVED REMISSION (H): ICD-10-CM

## 2019-05-22 DIAGNOSIS — R39.9 URINARY TRACT INFECTION SYMPTOMS: ICD-10-CM

## 2019-05-22 LAB
ALBUMIN UR-MCNC: ABNORMAL MG/DL
APPEARANCE UR: CLEAR
BACTERIA #/AREA URNS HPF: ABNORMAL HPF
BILIRUB UR QL STRIP: NEGATIVE
COLOR UR AUTO: YELLOW
GLUCOSE UR STRIP-MCNC: NEGATIVE MG/DL
HGB UR QL STRIP: NEGATIVE
KETONES UR STRIP-MCNC: NEGATIVE MG/DL
LEUKOCYTE ESTERASE UR QL STRIP: NEGATIVE
MUCOUS THREADS #/AREA URNS LPF: ABNORMAL LPF
NITRATE UR QL: NEGATIVE
PH UR STRIP: 5.5 [PH] (ref 4.5–8)
RBC #/AREA URNS AUTO: ABNORMAL HPF
SP GR UR STRIP: 1.02 (ref 1–1.03)
SQUAMOUS #/AREA URNS AUTO: ABNORMAL LPF
UROBILINOGEN UR STRIP-ACNC: ABNORMAL
WBC #/AREA URNS AUTO: ABNORMAL HPF

## 2019-05-29 ENCOUNTER — INFUSION - HEALTHEAST (OUTPATIENT)
Dept: INFUSION THERAPY | Facility: HOSPITAL | Age: 59
End: 2019-05-29

## 2019-05-29 ENCOUNTER — AMBULATORY - HEALTHEAST (OUTPATIENT)
Dept: INFUSION THERAPY | Facility: HOSPITAL | Age: 59
End: 2019-05-29

## 2019-05-29 DIAGNOSIS — C90.00 MULTIPLE MYELOMA NOT HAVING ACHIEVED REMISSION (H): ICD-10-CM

## 2019-05-29 LAB
ALBUMIN SERPL-MCNC: 3.4 G/DL (ref 3.5–5)
ALP SERPL-CCNC: 53 U/L (ref 45–120)
ALT SERPL W P-5'-P-CCNC: <9 U/L (ref 0–45)
ANION GAP SERPL CALCULATED.3IONS-SCNC: 7 MMOL/L (ref 5–18)
AST SERPL W P-5'-P-CCNC: 12 U/L (ref 0–40)
BASOPHILS # BLD AUTO: 0 THOU/UL (ref 0–0.2)
BASOPHILS NFR BLD AUTO: 0 % (ref 0–2)
BILIRUB SERPL-MCNC: 0.4 MG/DL (ref 0–1)
BUN SERPL-MCNC: 10 MG/DL (ref 8–22)
CALCIUM SERPL-MCNC: 9.8 MG/DL (ref 8.5–10.5)
CHLORIDE BLD-SCNC: 106 MMOL/L (ref 98–107)
CO2 SERPL-SCNC: 31 MMOL/L (ref 22–31)
CREAT SERPL-MCNC: 0.73 MG/DL (ref 0.7–1.3)
EOSINOPHIL # BLD AUTO: 0.4 THOU/UL (ref 0–0.4)
EOSINOPHIL NFR BLD AUTO: 3 % (ref 0–6)
ERYTHROCYTE [DISTWIDTH] IN BLOOD BY AUTOMATED COUNT: 14.4 % (ref 11–14.5)
GFR SERPL CREATININE-BSD FRML MDRD: >60 ML/MIN/1.73M2
GLUCOSE BLD-MCNC: 99 MG/DL (ref 70–125)
HCT VFR BLD AUTO: 34.5 % (ref 40–54)
HGB BLD-MCNC: 11.4 G/DL (ref 14–18)
LYMPHOCYTES # BLD AUTO: 1.1 THOU/UL (ref 0.8–4.4)
LYMPHOCYTES NFR BLD AUTO: 9 % (ref 20–40)
MCH RBC QN AUTO: 32.6 PG (ref 27–34)
MCHC RBC AUTO-ENTMCNC: 33 G/DL (ref 32–36)
MCV RBC AUTO: 99 FL (ref 80–100)
MONOCYTES # BLD AUTO: 0.9 THOU/UL (ref 0–0.9)
MONOCYTES NFR BLD AUTO: 7 % (ref 2–10)
NEUTROPHILS # BLD AUTO: 10.1 THOU/UL (ref 2–7.7)
NEUTROPHILS NFR BLD AUTO: 81 % (ref 50–70)
PLATELET # BLD AUTO: 270 THOU/UL (ref 140–440)
PMV BLD AUTO: 11 FL (ref 8.5–12.5)
POTASSIUM BLD-SCNC: 4.6 MMOL/L (ref 3.5–5)
PROT SERPL-MCNC: 6.6 G/DL (ref 6–8)
RBC # BLD AUTO: 3.5 MILL/UL (ref 4.4–6.2)
SODIUM SERPL-SCNC: 144 MMOL/L (ref 136–145)
WBC: 12.6 THOU/UL (ref 4–11)

## 2019-05-31 ENCOUNTER — AMBULATORY - HEALTHEAST (OUTPATIENT)
Dept: ADMINISTRATIVE | Facility: CLINIC | Age: 59
End: 2019-05-31

## 2019-05-31 DIAGNOSIS — R59.9 SWOLLEN LYMPH NODES: ICD-10-CM

## 2019-05-31 DIAGNOSIS — R13.10 SWALLOWING DIFFICULTY: ICD-10-CM

## 2019-06-05 ENCOUNTER — INFUSION - HEALTHEAST (OUTPATIENT)
Dept: INFUSION THERAPY | Facility: HOSPITAL | Age: 59
End: 2019-06-05

## 2019-06-05 DIAGNOSIS — C90.00 MULTIPLE MYELOMA NOT HAVING ACHIEVED REMISSION (H): ICD-10-CM

## 2019-06-06 ENCOUNTER — RECORDS - HEALTHEAST (OUTPATIENT)
Dept: ADMINISTRATIVE | Facility: OTHER | Age: 59
End: 2019-06-06

## 2019-06-10 ENCOUNTER — OFFICE VISIT - HEALTHEAST (OUTPATIENT)
Dept: OTOLARYNGOLOGY | Facility: CLINIC | Age: 59
End: 2019-06-10

## 2019-06-10 DIAGNOSIS — R13.13 PHARYNGEAL DYSPHAGIA: ICD-10-CM

## 2019-06-12 ENCOUNTER — INFUSION - HEALTHEAST (OUTPATIENT)
Dept: INFUSION THERAPY | Facility: HOSPITAL | Age: 59
End: 2019-06-12

## 2019-06-12 DIAGNOSIS — C90.00 MULTIPLE MYELOMA NOT HAVING ACHIEVED REMISSION (H): ICD-10-CM

## 2019-06-13 ENCOUNTER — RECORDS - HEALTHEAST (OUTPATIENT)
Dept: ADMINISTRATIVE | Facility: OTHER | Age: 59
End: 2019-06-13

## 2019-06-19 ENCOUNTER — INFUSION - HEALTHEAST (OUTPATIENT)
Dept: INFUSION THERAPY | Facility: HOSPITAL | Age: 59
End: 2019-06-19

## 2019-06-19 DIAGNOSIS — C90.00 MULTIPLE MYELOMA NOT HAVING ACHIEVED REMISSION (H): ICD-10-CM

## 2019-06-20 ENCOUNTER — HOSPITAL ENCOUNTER (OUTPATIENT)
Dept: RADIOLOGY | Facility: CLINIC | Age: 59
Discharge: HOME | End: 2019-06-20
Attending: FAMILY MEDICINE

## 2019-06-20 DIAGNOSIS — R13.10 DYSPHAGIA: ICD-10-CM

## 2019-06-20 DIAGNOSIS — C90.00 MYELOMA (H): ICD-10-CM

## 2019-06-20 DIAGNOSIS — C61 PROSTATE CANCER (H): ICD-10-CM

## 2019-06-26 ENCOUNTER — AMBULATORY - HEALTHEAST (OUTPATIENT)
Dept: INFUSION THERAPY | Facility: HOSPITAL | Age: 59
End: 2019-06-26

## 2019-06-26 ENCOUNTER — INFUSION - HEALTHEAST (OUTPATIENT)
Dept: INFUSION THERAPY | Facility: HOSPITAL | Age: 59
End: 2019-06-26

## 2019-06-26 DIAGNOSIS — C90.00 MULTIPLE MYELOMA NOT HAVING ACHIEVED REMISSION (H): ICD-10-CM

## 2019-06-26 LAB
ALBUMIN SERPL-MCNC: 3.4 G/DL (ref 3.5–5)
ALP SERPL-CCNC: 43 U/L (ref 45–120)
ALT SERPL W P-5'-P-CCNC: 13 U/L (ref 0–45)
ANION GAP SERPL CALCULATED.3IONS-SCNC: 7 MMOL/L (ref 5–18)
AST SERPL W P-5'-P-CCNC: 14 U/L (ref 0–40)
BASOPHILS # BLD AUTO: 0 THOU/UL (ref 0–0.2)
BASOPHILS NFR BLD AUTO: 0 % (ref 0–2)
BILIRUB SERPL-MCNC: 0.3 MG/DL (ref 0–1)
BUN SERPL-MCNC: 9 MG/DL (ref 8–22)
CALCIUM SERPL-MCNC: 9.8 MG/DL (ref 8.5–10.5)
CHLORIDE BLD-SCNC: 106 MMOL/L (ref 98–107)
CO2 SERPL-SCNC: 31 MMOL/L (ref 22–31)
CREAT SERPL-MCNC: 0.77 MG/DL (ref 0.7–1.3)
EOSINOPHIL # BLD AUTO: 0.4 THOU/UL (ref 0–0.4)
EOSINOPHIL NFR BLD AUTO: 4 % (ref 0–6)
ERYTHROCYTE [DISTWIDTH] IN BLOOD BY AUTOMATED COUNT: 14.4 % (ref 11–14.5)
GFR SERPL CREATININE-BSD FRML MDRD: >60 ML/MIN/1.73M2
GLUCOSE BLD-MCNC: 97 MG/DL (ref 70–125)
HCT VFR BLD AUTO: 35.6 % (ref 40–54)
HGB BLD-MCNC: 11.5 G/DL (ref 14–18)
LYMPHOCYTES # BLD AUTO: 1.3 THOU/UL (ref 0.8–4.4)
LYMPHOCYTES NFR BLD AUTO: 14 % (ref 20–40)
MCH RBC QN AUTO: 31.8 PG (ref 27–34)
MCHC RBC AUTO-ENTMCNC: 32.3 G/DL (ref 32–36)
MCV RBC AUTO: 98 FL (ref 80–100)
MONOCYTES # BLD AUTO: 1 THOU/UL (ref 0–0.9)
MONOCYTES NFR BLD AUTO: 11 % (ref 2–10)
NEUTROPHILS # BLD AUTO: 6.5 THOU/UL (ref 2–7.7)
NEUTROPHILS NFR BLD AUTO: 70 % (ref 50–70)
PLATELET # BLD AUTO: 274 THOU/UL (ref 140–440)
PMV BLD AUTO: 11.2 FL (ref 8.5–12.5)
POTASSIUM BLD-SCNC: 4.3 MMOL/L (ref 3.5–5)
PROT SERPL-MCNC: 6.6 G/DL (ref 6–8)
RBC # BLD AUTO: 3.62 MILL/UL (ref 4.4–6.2)
SODIUM SERPL-SCNC: 144 MMOL/L (ref 136–145)
WBC: 9.3 THOU/UL (ref 4–11)

## 2019-06-26 RX ORDER — CHLORHEXIDINE GLUCONATE ORAL RINSE 1.2 MG/ML
10 SOLUTION DENTAL 2 TIMES DAILY
Status: SHIPPED | COMMUNITY
Start: 2019-06-26

## 2019-07-03 ENCOUNTER — AMBULATORY - HEALTHEAST (OUTPATIENT)
Dept: INFUSION THERAPY | Facility: HOSPITAL | Age: 59
End: 2019-07-03

## 2019-07-03 ENCOUNTER — INFUSION - HEALTHEAST (OUTPATIENT)
Dept: INFUSION THERAPY | Facility: HOSPITAL | Age: 59
End: 2019-07-03

## 2019-07-03 DIAGNOSIS — C61 PROSTATE CANCER METASTATIC TO BONE (H): ICD-10-CM

## 2019-07-03 DIAGNOSIS — C79.51 PROSTATE CANCER METASTATIC TO BONE (H): ICD-10-CM

## 2019-07-03 DIAGNOSIS — C90.00 MULTIPLE MYELOMA NOT HAVING ACHIEVED REMISSION (H): ICD-10-CM

## 2019-07-03 LAB — PSA SERPL-MCNC: 1 NG/ML (ref 0–4.5)

## 2019-07-11 ENCOUNTER — INFUSION - HEALTHEAST (OUTPATIENT)
Dept: INFUSION THERAPY | Facility: HOSPITAL | Age: 59
End: 2019-07-11

## 2019-07-11 DIAGNOSIS — C90.00 MULTIPLE MYELOMA NOT HAVING ACHIEVED REMISSION (H): ICD-10-CM

## 2019-07-11 DIAGNOSIS — C79.51 PROSTATE CANCER METASTATIC TO BONE (H): ICD-10-CM

## 2019-07-11 DIAGNOSIS — C61 PROSTATE CANCER METASTATIC TO BONE (H): ICD-10-CM

## 2019-07-17 ENCOUNTER — INFUSION - HEALTHEAST (OUTPATIENT)
Dept: INFUSION THERAPY | Facility: HOSPITAL | Age: 59
End: 2019-07-17

## 2019-07-17 DIAGNOSIS — C90.00 MULTIPLE MYELOMA NOT HAVING ACHIEVED REMISSION (H): ICD-10-CM

## 2019-07-24 ENCOUNTER — AMBULATORY - HEALTHEAST (OUTPATIENT)
Dept: INFUSION THERAPY | Facility: HOSPITAL | Age: 59
End: 2019-07-24

## 2019-07-24 ENCOUNTER — INFUSION - HEALTHEAST (OUTPATIENT)
Dept: INFUSION THERAPY | Facility: HOSPITAL | Age: 59
End: 2019-07-24

## 2019-07-24 DIAGNOSIS — C90.00 MULTIPLE MYELOMA NOT HAVING ACHIEVED REMISSION (H): ICD-10-CM

## 2019-07-24 LAB
ALBUMIN SERPL-MCNC: 3.2 G/DL (ref 3.5–5)
ALP SERPL-CCNC: 42 U/L (ref 45–120)
ALT SERPL W P-5'-P-CCNC: 12 U/L (ref 0–45)
ANION GAP SERPL CALCULATED.3IONS-SCNC: 7 MMOL/L (ref 5–18)
AST SERPL W P-5'-P-CCNC: 12 U/L (ref 0–40)
BASOPHILS # BLD AUTO: 0 THOU/UL (ref 0–0.2)
BASOPHILS NFR BLD AUTO: 0 % (ref 0–2)
BILIRUB SERPL-MCNC: 0.3 MG/DL (ref 0–1)
BUN SERPL-MCNC: 8 MG/DL (ref 8–22)
CALCIUM SERPL-MCNC: 9.7 MG/DL (ref 8.5–10.5)
CHLORIDE BLD-SCNC: 106 MMOL/L (ref 98–107)
CO2 SERPL-SCNC: 30 MMOL/L (ref 22–31)
CREAT SERPL-MCNC: 0.71 MG/DL (ref 0.7–1.3)
EOSINOPHIL # BLD AUTO: 0.3 THOU/UL (ref 0–0.4)
EOSINOPHIL NFR BLD AUTO: 4 % (ref 0–6)
ERYTHROCYTE [DISTWIDTH] IN BLOOD BY AUTOMATED COUNT: 14 % (ref 11–14.5)
GFR SERPL CREATININE-BSD FRML MDRD: >60 ML/MIN/1.73M2
GLUCOSE BLD-MCNC: 98 MG/DL (ref 70–125)
HCT VFR BLD AUTO: 34.6 % (ref 40–54)
HGB BLD-MCNC: 11.4 G/DL (ref 14–18)
IGA SERPL-MCNC: 416 MG/DL (ref 65–400)
IGA SERPL-MCNC: 795 MG/DL
IGM SERPL-MCNC: 34 MG/DL (ref 60–280)
LYMPHOCYTES # BLD AUTO: 1.2 THOU/UL (ref 0.8–4.4)
LYMPHOCYTES NFR BLD AUTO: 14 % (ref 20–40)
MCH RBC QN AUTO: 31.9 PG (ref 27–34)
MCHC RBC AUTO-ENTMCNC: 32.9 G/DL (ref 32–36)
MCV RBC AUTO: 97 FL (ref 80–100)
MONOCYTES # BLD AUTO: 0.7 THOU/UL (ref 0–0.9)
MONOCYTES NFR BLD AUTO: 8 % (ref 2–10)
NEUTROPHILS # BLD AUTO: 6.2 THOU/UL (ref 2–7.7)
NEUTROPHILS NFR BLD AUTO: 74 % (ref 50–70)
PLATELET # BLD AUTO: 275 THOU/UL (ref 140–440)
PMV BLD AUTO: 11 FL (ref 8.5–12.5)
POTASSIUM BLD-SCNC: 3.7 MMOL/L (ref 3.5–5)
PROT SERPL-MCNC: 6.7 G/DL (ref 6–8)
RBC # BLD AUTO: 3.57 MILL/UL (ref 4.4–6.2)
SODIUM SERPL-SCNC: 143 MMOL/L (ref 136–145)
WBC: 8.4 THOU/UL (ref 4–11)

## 2019-07-25 LAB
PATH ICD:: NORMAL
PROT PATTERN SERPL IFE-IMP: NORMAL
REVIEWING PATHOLOGIST: NORMAL

## 2019-07-26 LAB
ALBUMIN PERCENT: 55.8 % (ref 51–67)
ALBUMIN SERPL ELPH-MCNC: 3.3 G/DL (ref 3.2–4.7)
ALPHA 1 PERCENT: 4 % (ref 2–4)
ALPHA 2 PERCENT: 13.1 % (ref 5–13)
ALPHA1 GLOB SERPL ELPH-MCNC: 0.2 G/DL (ref 0.1–0.3)
ALPHA2 GLOB SERPL ELPH-MCNC: 0.8 G/DL (ref 0.4–0.9)
ARUP MISCELLANEOUS TEST: ABNORMAL
B-GLOBULIN SERPL ELPH-MCNC: 0.8 G/DL (ref 0.7–1.2)
BETA PERCENT: 13.1 % (ref 10–17)
GAMMA GLOB SERPL ELPH-MCNC: 0.8 G/DL (ref 0.6–1.4)
GAMMA GLOBULIN PERCENT: 14 % (ref 9–20)
M PROTEIN SERPL ELPH-MCNC: ABNORMAL G/DL
MISCELLANEOUS TEST DEPT. - HE HISTORICAL: NORMAL
PATH ICD:: ABNORMAL
PERFORMING LAB: NORMAL
PROT PATTERN SERPL ELPH-IMP: ABNORMAL
PROT SERPL-MCNC: 5.9 G/DL (ref 6–8)
REVIEWING PATHOLOGIST: ABNORMAL
SPECIMEN STATUS: NORMAL
TEST NAME: NORMAL

## 2019-07-31 ENCOUNTER — OFFICE VISIT - HEALTHEAST (OUTPATIENT)
Dept: ONCOLOGY | Facility: HOSPITAL | Age: 59
End: 2019-07-31

## 2019-07-31 ENCOUNTER — INFUSION - HEALTHEAST (OUTPATIENT)
Dept: INFUSION THERAPY | Facility: HOSPITAL | Age: 59
End: 2019-07-31

## 2019-07-31 DIAGNOSIS — C90.00 MULTIPLE MYELOMA NOT HAVING ACHIEVED REMISSION (H): ICD-10-CM

## 2019-07-31 DIAGNOSIS — C61 PROSTATE CANCER METASTATIC TO BONE (H): ICD-10-CM

## 2019-07-31 DIAGNOSIS — C79.51 PROSTATE CANCER METASTATIC TO BONE (H): ICD-10-CM

## 2019-07-31 RX ORDER — METOCLOPRAMIDE 5 MG/1
5 TABLET ORAL 2 TIMES DAILY
Status: SHIPPED | COMMUNITY
Start: 2019-07-31

## 2019-08-07 ENCOUNTER — INFUSION - HEALTHEAST (OUTPATIENT)
Dept: INFUSION THERAPY | Facility: HOSPITAL | Age: 59
End: 2019-08-07

## 2019-08-07 DIAGNOSIS — C90.00 MULTIPLE MYELOMA NOT HAVING ACHIEVED REMISSION (H): ICD-10-CM

## 2019-08-14 ENCOUNTER — INFUSION - HEALTHEAST (OUTPATIENT)
Dept: INFUSION THERAPY | Facility: HOSPITAL | Age: 59
End: 2019-08-14

## 2019-08-14 DIAGNOSIS — C90.00 MULTIPLE MYELOMA NOT HAVING ACHIEVED REMISSION (H): ICD-10-CM

## 2019-08-21 ENCOUNTER — AMBULATORY - HEALTHEAST (OUTPATIENT)
Dept: INFUSION THERAPY | Facility: HOSPITAL | Age: 59
End: 2019-08-21

## 2019-08-21 ENCOUNTER — INFUSION - HEALTHEAST (OUTPATIENT)
Dept: INFUSION THERAPY | Facility: HOSPITAL | Age: 59
End: 2019-08-21

## 2019-08-21 DIAGNOSIS — C90.00 MULTIPLE MYELOMA NOT HAVING ACHIEVED REMISSION (H): ICD-10-CM

## 2019-08-21 LAB
ALBUMIN SERPL-MCNC: 3.4 G/DL (ref 3.5–5)
ALP SERPL-CCNC: 42 U/L (ref 45–120)
ALT SERPL W P-5'-P-CCNC: 15 U/L (ref 0–45)
ANION GAP SERPL CALCULATED.3IONS-SCNC: 10 MMOL/L (ref 5–18)
AST SERPL W P-5'-P-CCNC: 15 U/L (ref 0–40)
BASOPHILS # BLD AUTO: 0 THOU/UL (ref 0–0.2)
BASOPHILS NFR BLD AUTO: 0 % (ref 0–2)
BILIRUB SERPL-MCNC: 0.4 MG/DL (ref 0–1)
BUN SERPL-MCNC: 13 MG/DL (ref 8–22)
CALCIUM SERPL-MCNC: 9.9 MG/DL (ref 8.5–10.5)
CHLORIDE BLD-SCNC: 105 MMOL/L (ref 98–107)
CO2 SERPL-SCNC: 28 MMOL/L (ref 22–31)
CREAT SERPL-MCNC: 0.71 MG/DL (ref 0.7–1.3)
EOSINOPHIL # BLD AUTO: 0.2 THOU/UL (ref 0–0.4)
EOSINOPHIL NFR BLD AUTO: 2 % (ref 0–6)
ERYTHROCYTE [DISTWIDTH] IN BLOOD BY AUTOMATED COUNT: 14.2 % (ref 11–14.5)
GFR SERPL CREATININE-BSD FRML MDRD: >60 ML/MIN/1.73M2
GLUCOSE BLD-MCNC: 93 MG/DL (ref 70–125)
HCT VFR BLD AUTO: 34.4 % (ref 40–54)
HGB BLD-MCNC: 11.3 G/DL (ref 14–18)
LYMPHOCYTES # BLD AUTO: 1.5 THOU/UL (ref 0.8–4.4)
LYMPHOCYTES NFR BLD AUTO: 15 % (ref 20–40)
MCH RBC QN AUTO: 31.7 PG (ref 27–34)
MCHC RBC AUTO-ENTMCNC: 32.8 G/DL (ref 32–36)
MCV RBC AUTO: 97 FL (ref 80–100)
MONOCYTES # BLD AUTO: 1.1 THOU/UL (ref 0–0.9)
MONOCYTES NFR BLD AUTO: 10 % (ref 2–10)
NEUTROPHILS # BLD AUTO: 7.6 THOU/UL (ref 2–7.7)
NEUTROPHILS NFR BLD AUTO: 73 % (ref 50–70)
PLATELET # BLD AUTO: 348 THOU/UL (ref 140–440)
PMV BLD AUTO: 10.5 FL (ref 8.5–12.5)
POTASSIUM BLD-SCNC: 3.2 MMOL/L (ref 3.5–5)
PROT SERPL-MCNC: 7 G/DL (ref 6–8)
RBC # BLD AUTO: 3.56 MILL/UL (ref 4.4–6.2)
SODIUM SERPL-SCNC: 143 MMOL/L (ref 136–145)
WBC: 10.4 THOU/UL (ref 4–11)

## 2019-08-21 RX ORDER — CYANOCOBALAMIN 1000 UG/ML
1000 INJECTION, SOLUTION INTRAMUSCULAR; SUBCUTANEOUS
Status: SHIPPED | COMMUNITY
Start: 2019-08-21

## 2019-08-28 ENCOUNTER — INFUSION - HEALTHEAST (OUTPATIENT)
Dept: INFUSION THERAPY | Facility: HOSPITAL | Age: 59
End: 2019-08-28

## 2019-08-28 DIAGNOSIS — C90.00 MULTIPLE MYELOMA NOT HAVING ACHIEVED REMISSION (H): ICD-10-CM

## 2019-09-04 ENCOUNTER — INFUSION - HEALTHEAST (OUTPATIENT)
Dept: INFUSION THERAPY | Facility: HOSPITAL | Age: 59
End: 2019-09-04

## 2019-09-04 DIAGNOSIS — C90.00 MULTIPLE MYELOMA NOT HAVING ACHIEVED REMISSION (H): ICD-10-CM

## 2019-09-09 ENCOUNTER — DOCUMENTATION ONLY (OUTPATIENT)
Dept: OTHER | Facility: CLINIC | Age: 59
End: 2019-09-09

## 2019-09-09 ENCOUNTER — AMBULATORY - HEALTHEAST (OUTPATIENT)
Dept: OTHER | Facility: CLINIC | Age: 59
End: 2019-09-09

## 2019-09-11 ENCOUNTER — INFUSION - HEALTHEAST (OUTPATIENT)
Dept: INFUSION THERAPY | Facility: HOSPITAL | Age: 59
End: 2019-09-11

## 2019-09-11 DIAGNOSIS — C90.00 MULTIPLE MYELOMA NOT HAVING ACHIEVED REMISSION (H): ICD-10-CM

## 2019-09-18 ENCOUNTER — AMBULATORY - HEALTHEAST (OUTPATIENT)
Dept: INFUSION THERAPY | Facility: HOSPITAL | Age: 59
End: 2019-09-18

## 2019-09-18 ENCOUNTER — INFUSION - HEALTHEAST (OUTPATIENT)
Dept: INFUSION THERAPY | Facility: HOSPITAL | Age: 59
End: 2019-09-18

## 2019-09-18 DIAGNOSIS — C90.00 MULTIPLE MYELOMA NOT HAVING ACHIEVED REMISSION (H): ICD-10-CM

## 2019-09-18 LAB
ALBUMIN SERPL-MCNC: 3.5 G/DL (ref 3.5–5)
ALP SERPL-CCNC: 51 U/L (ref 45–120)
ALT SERPL W P-5'-P-CCNC: 13 U/L (ref 0–45)
ANION GAP SERPL CALCULATED.3IONS-SCNC: 8 MMOL/L (ref 5–18)
AST SERPL W P-5'-P-CCNC: 11 U/L (ref 0–40)
BASOPHILS # BLD AUTO: 0 THOU/UL (ref 0–0.2)
BASOPHILS NFR BLD AUTO: 0 % (ref 0–2)
BILIRUB SERPL-MCNC: 0.2 MG/DL (ref 0–1)
BUN SERPL-MCNC: 12 MG/DL (ref 8–22)
CALCIUM SERPL-MCNC: 9.5 MG/DL (ref 8.5–10.5)
CHLORIDE BLD-SCNC: 102 MMOL/L (ref 98–107)
CO2 SERPL-SCNC: 30 MMOL/L (ref 22–31)
CREAT SERPL-MCNC: 0.76 MG/DL (ref 0.7–1.3)
EOSINOPHIL # BLD AUTO: 0.2 THOU/UL (ref 0–0.4)
EOSINOPHIL NFR BLD AUTO: 2 % (ref 0–6)
ERYTHROCYTE [DISTWIDTH] IN BLOOD BY AUTOMATED COUNT: 14.5 % (ref 11–14.5)
GFR SERPL CREATININE-BSD FRML MDRD: >60 ML/MIN/1.73M2
GLUCOSE BLD-MCNC: 77 MG/DL (ref 70–125)
HCT VFR BLD AUTO: 36.5 % (ref 40–54)
HGB BLD-MCNC: 11.7 G/DL (ref 14–18)
LYMPHOCYTES # BLD AUTO: 1.6 THOU/UL (ref 0.8–4.4)
LYMPHOCYTES NFR BLD AUTO: 15 % (ref 20–40)
MCH RBC QN AUTO: 31.7 PG (ref 27–34)
MCHC RBC AUTO-ENTMCNC: 32.1 G/DL (ref 32–36)
MCV RBC AUTO: 99 FL (ref 80–100)
MONOCYTES # BLD AUTO: 1 THOU/UL (ref 0–0.9)
MONOCYTES NFR BLD AUTO: 9 % (ref 2–10)
NEUTROPHILS # BLD AUTO: 8.4 THOU/UL (ref 2–7.7)
NEUTROPHILS NFR BLD AUTO: 75 % (ref 50–70)
PLATELET # BLD AUTO: 271 THOU/UL (ref 140–440)
PMV BLD AUTO: 10.7 FL (ref 8.5–12.5)
POTASSIUM BLD-SCNC: 3.5 MMOL/L (ref 3.5–5)
PROT SERPL-MCNC: 6.7 G/DL (ref 6–8)
RBC # BLD AUTO: 3.69 MILL/UL (ref 4.4–6.2)
SODIUM SERPL-SCNC: 140 MMOL/L (ref 136–145)
WBC: 11.2 THOU/UL (ref 4–11)

## 2019-09-19 ENCOUNTER — RECORDS - HEALTHEAST (OUTPATIENT)
Dept: ADMINISTRATIVE | Facility: OTHER | Age: 59
End: 2019-09-19

## 2019-09-25 ENCOUNTER — RECORDS - HEALTHEAST (OUTPATIENT)
Dept: ADMINISTRATIVE | Facility: OTHER | Age: 59
End: 2019-09-25

## 2019-10-02 ENCOUNTER — INFUSION - HEALTHEAST (OUTPATIENT)
Dept: INFUSION THERAPY | Facility: HOSPITAL | Age: 59
End: 2019-10-02

## 2019-10-02 DIAGNOSIS — C61 PROSTATE CANCER METASTATIC TO MULTIPLE SITES (H): ICD-10-CM

## 2019-10-02 DIAGNOSIS — C90.00 MULTIPLE MYELOMA NOT HAVING ACHIEVED REMISSION (H): ICD-10-CM

## 2019-10-03 ENCOUNTER — RECORDS - HEALTHEAST (OUTPATIENT)
Dept: ADMINISTRATIVE | Facility: OTHER | Age: 59
End: 2019-10-03

## 2019-10-09 ENCOUNTER — INFUSION - HEALTHEAST (OUTPATIENT)
Dept: INFUSION THERAPY | Facility: HOSPITAL | Age: 59
End: 2019-10-09

## 2019-10-09 DIAGNOSIS — C90.00 MULTIPLE MYELOMA NOT HAVING ACHIEVED REMISSION (H): ICD-10-CM

## 2019-10-14 ENCOUNTER — RECORDS - HEALTHEAST (OUTPATIENT)
Dept: ADMINISTRATIVE | Facility: OTHER | Age: 59
End: 2019-10-14

## 2019-10-16 ENCOUNTER — AMBULATORY - HEALTHEAST (OUTPATIENT)
Dept: INFUSION THERAPY | Facility: HOSPITAL | Age: 59
End: 2019-10-16

## 2019-10-16 ENCOUNTER — INFUSION - HEALTHEAST (OUTPATIENT)
Dept: INFUSION THERAPY | Facility: HOSPITAL | Age: 59
End: 2019-10-16

## 2019-10-16 ENCOUNTER — COMMUNICATION - HEALTHEAST (OUTPATIENT)
Dept: ONCOLOGY | Facility: HOSPITAL | Age: 59
End: 2019-10-16

## 2019-10-16 DIAGNOSIS — C90.00 MULTIPLE MYELOMA NOT HAVING ACHIEVED REMISSION (H): ICD-10-CM

## 2019-10-17 ENCOUNTER — RECORDS - HEALTHEAST (OUTPATIENT)
Dept: ADMINISTRATIVE | Facility: OTHER | Age: 59
End: 2019-10-17

## 2019-10-23 ENCOUNTER — INFUSION - HEALTHEAST (OUTPATIENT)
Dept: INFUSION THERAPY | Facility: HOSPITAL | Age: 59
End: 2019-10-23

## 2019-10-23 ENCOUNTER — AMBULATORY - HEALTHEAST (OUTPATIENT)
Dept: INFUSION THERAPY | Facility: HOSPITAL | Age: 59
End: 2019-10-23

## 2019-10-23 ENCOUNTER — OFFICE VISIT - HEALTHEAST (OUTPATIENT)
Dept: ONCOLOGY | Facility: HOSPITAL | Age: 59
End: 2019-10-23

## 2019-10-23 DIAGNOSIS — C61 PROSTATE CANCER METASTATIC TO MULTIPLE SITES (H): ICD-10-CM

## 2019-10-23 DIAGNOSIS — C90.00 MULTIPLE MYELOMA NOT HAVING ACHIEVED REMISSION (H): ICD-10-CM

## 2019-10-23 LAB
ALBUMIN SERPL-MCNC: 3.7 G/DL (ref 3.5–5)
ALP SERPL-CCNC: 55 U/L (ref 45–120)
ALT SERPL W P-5'-P-CCNC: 13 U/L (ref 0–45)
ANION GAP SERPL CALCULATED.3IONS-SCNC: 8 MMOL/L (ref 5–18)
AST SERPL W P-5'-P-CCNC: 11 U/L (ref 0–40)
BASOPHILS # BLD AUTO: 0 THOU/UL (ref 0–0.2)
BASOPHILS NFR BLD AUTO: 0 % (ref 0–2)
BILIRUB SERPL-MCNC: 0.4 MG/DL (ref 0–1)
BUN SERPL-MCNC: 12 MG/DL (ref 8–22)
CALCIUM SERPL-MCNC: 9.7 MG/DL (ref 8.5–10.5)
CHLORIDE BLD-SCNC: 106 MMOL/L (ref 98–107)
CO2 SERPL-SCNC: 31 MMOL/L (ref 22–31)
CREAT SERPL-MCNC: 0.81 MG/DL (ref 0.7–1.3)
EOSINOPHIL # BLD AUTO: 0.3 THOU/UL (ref 0–0.4)
EOSINOPHIL NFR BLD AUTO: 2 % (ref 0–6)
ERYTHROCYTE [DISTWIDTH] IN BLOOD BY AUTOMATED COUNT: 14.9 % (ref 11–14.5)
GFR SERPL CREATININE-BSD FRML MDRD: >60 ML/MIN/1.73M2
GLUCOSE BLD-MCNC: 93 MG/DL (ref 70–125)
HCT VFR BLD AUTO: 40 % (ref 40–54)
HGB BLD-MCNC: 12.4 G/DL (ref 14–18)
IGA SERPL-MCNC: 534 MG/DL (ref 65–400)
IGA SERPL-MCNC: 867 MG/DL
IGM SERPL-MCNC: 41 MG/DL (ref 60–280)
LYMPHOCYTES # BLD AUTO: 1.2 THOU/UL (ref 0.8–4.4)
LYMPHOCYTES NFR BLD AUTO: 11 % (ref 20–40)
MCH RBC QN AUTO: 31.6 PG (ref 27–34)
MCHC RBC AUTO-ENTMCNC: 31 G/DL (ref 32–36)
MCV RBC AUTO: 102 FL (ref 80–100)
MONOCYTES # BLD AUTO: 0.6 THOU/UL (ref 0–0.9)
MONOCYTES NFR BLD AUTO: 5 % (ref 2–10)
NEUTROPHILS # BLD AUTO: 8.7 THOU/UL (ref 2–7.7)
NEUTROPHILS NFR BLD AUTO: 80 % (ref 50–70)
PLATELET # BLD AUTO: 248 THOU/UL (ref 140–440)
PMV BLD AUTO: 11.1 FL (ref 8.5–12.5)
POTASSIUM BLD-SCNC: 3.9 MMOL/L (ref 3.5–5)
PROT SERPL-MCNC: 7.1 G/DL (ref 6–8)
PSA SERPL-MCNC: 5 NG/ML (ref 0–4.5)
RBC # BLD AUTO: 3.93 MILL/UL (ref 4.4–6.2)
SODIUM SERPL-SCNC: 145 MMOL/L (ref 136–145)
WBC: 10.8 THOU/UL (ref 4–11)

## 2019-10-23 RX ORDER — NAPROXEN 500 MG/1
500 TABLET ORAL 2 TIMES DAILY WITH MEALS
Status: SHIPPED | COMMUNITY
Start: 2019-10-23

## 2019-10-23 ASSESSMENT — MIFFLIN-ST. JEOR: SCORE: 1927.1

## 2019-10-24 LAB
KAPPA LC FREE SER-MCNC: 1.59 MG/DL (ref 0.33–1.94)
KAPPA LC FREE/LAMBDA FREE SER NEPH: 2.01 {RATIO} (ref 0.26–1.65)
LAMBDA LC FREE SERPL-MCNC: 0.79 MG/DL (ref 0.57–2.63)

## 2019-10-25 LAB
ALBUMIN PERCENT: 59.5 % (ref 51–67)
ALBUMIN SERPL ELPH-MCNC: 4.1 G/DL (ref 3.2–4.7)
ALPHA 1 PERCENT: 2.7 % (ref 2–4)
ALPHA 2 PERCENT: 11.7 % (ref 5–13)
ALPHA1 GLOB SERPL ELPH-MCNC: 0.2 G/DL (ref 0.1–0.3)
ALPHA2 GLOB SERPL ELPH-MCNC: 0.8 G/DL (ref 0.4–0.9)
B-GLOBULIN SERPL ELPH-MCNC: 0.9 G/DL (ref 0.7–1.2)
BETA PERCENT: 12.4 % (ref 10–17)
GAMMA GLOB SERPL ELPH-MCNC: 0.9 G/DL (ref 0.6–1.4)
GAMMA GLOBULIN PERCENT: 13.7 % (ref 9–20)
M PROTEIN SERPL ELPH-MCNC: NORMAL G/DL
PATH ICD:: NORMAL
PATH ICD:: NORMAL
PROT PATTERN SERPL ELPH-IMP: NORMAL
PROT PATTERN SERPL IFE-IMP: NORMAL
PROT SERPL-MCNC: 6.9 G/DL (ref 6–8)
REVIEWING PATHOLOGIST: NORMAL
REVIEWING PATHOLOGIST: NORMAL

## 2019-10-30 ENCOUNTER — INFUSION - HEALTHEAST (OUTPATIENT)
Dept: INFUSION THERAPY | Facility: HOSPITAL | Age: 59
End: 2019-10-30

## 2019-10-30 DIAGNOSIS — C90.00 MULTIPLE MYELOMA NOT HAVING ACHIEVED REMISSION (H): ICD-10-CM

## 2019-10-30 LAB
ALBUMIN SERPL-MCNC: 3.5 G/DL (ref 3.5–5)
ALP SERPL-CCNC: 49 U/L (ref 45–120)
ALT SERPL W P-5'-P-CCNC: 14 U/L (ref 0–45)
ANION GAP SERPL CALCULATED.3IONS-SCNC: 5 MMOL/L (ref 5–18)
AST SERPL W P-5'-P-CCNC: 16 U/L (ref 0–40)
BASOPHILS # BLD AUTO: 0 THOU/UL (ref 0–0.2)
BASOPHILS NFR BLD AUTO: 1 % (ref 0–2)
BILIRUB SERPL-MCNC: 0.4 MG/DL (ref 0–1)
BUN SERPL-MCNC: 12 MG/DL (ref 8–22)
CALCIUM SERPL-MCNC: 9.8 MG/DL (ref 8.5–10.5)
CHLORIDE BLD-SCNC: 105 MMOL/L (ref 98–107)
CO2 SERPL-SCNC: 32 MMOL/L (ref 22–31)
CREAT SERPL-MCNC: 0.72 MG/DL (ref 0.7–1.3)
EOSINOPHIL # BLD AUTO: 0.3 THOU/UL (ref 0–0.4)
EOSINOPHIL NFR BLD AUTO: 4 % (ref 0–6)
ERYTHROCYTE [DISTWIDTH] IN BLOOD BY AUTOMATED COUNT: 14.6 % (ref 11–14.5)
GFR SERPL CREATININE-BSD FRML MDRD: >60 ML/MIN/1.73M2
GLUCOSE BLD-MCNC: 92 MG/DL (ref 70–125)
HCT VFR BLD AUTO: 38.3 % (ref 40–54)
HGB BLD-MCNC: 12.4 G/DL (ref 14–18)
LYMPHOCYTES # BLD AUTO: 1 THOU/UL (ref 0.8–4.4)
LYMPHOCYTES NFR BLD AUTO: 15 % (ref 20–40)
MCH RBC QN AUTO: 32.5 PG (ref 27–34)
MCHC RBC AUTO-ENTMCNC: 32.4 G/DL (ref 32–36)
MCV RBC AUTO: 101 FL (ref 80–100)
MONOCYTES # BLD AUTO: 0.9 THOU/UL (ref 0–0.9)
MONOCYTES NFR BLD AUTO: 14 % (ref 2–10)
NEUTROPHILS # BLD AUTO: 4.4 THOU/UL (ref 2–7.7)
NEUTROPHILS NFR BLD AUTO: 66 % (ref 50–70)
PLATELET # BLD AUTO: 203 THOU/UL (ref 140–440)
PMV BLD AUTO: 11.1 FL (ref 8.5–12.5)
POTASSIUM BLD-SCNC: 3.9 MMOL/L (ref 3.5–5)
PROT SERPL-MCNC: 6.9 G/DL (ref 6–8)
RBC # BLD AUTO: 3.81 MILL/UL (ref 4.4–6.2)
SODIUM SERPL-SCNC: 142 MMOL/L (ref 136–145)
WBC: 6.6 THOU/UL (ref 4–11)

## 2019-11-04 ENCOUNTER — RECORDS - HEALTHEAST (OUTPATIENT)
Dept: LAB | Facility: CLINIC | Age: 59
End: 2019-11-04

## 2019-11-04 LAB
ALBUMIN SERPL-MCNC: 3.5 G/DL (ref 3.5–5)
ALP SERPL-CCNC: 46 U/L (ref 45–120)
ALT SERPL W P-5'-P-CCNC: 16 U/L (ref 0–45)
ANION GAP SERPL CALCULATED.3IONS-SCNC: 8 MMOL/L (ref 5–18)
AST SERPL W P-5'-P-CCNC: 16 U/L (ref 0–40)
BASOPHILS # BLD AUTO: 0.1 THOU/UL (ref 0–0.2)
BASOPHILS NFR BLD AUTO: 0 % (ref 0–2)
BILIRUB SERPL-MCNC: 0.5 MG/DL (ref 0–1)
BUN SERPL-MCNC: 18 MG/DL (ref 8–22)
C REACTIVE PROTEIN LHE: 0.3 MG/DL (ref 0–0.8)
CALCIUM SERPL-MCNC: 9.6 MG/DL (ref 8.5–10.5)
CHLORIDE BLD-SCNC: 105 MMOL/L (ref 98–107)
CO2 SERPL-SCNC: 30 MMOL/L (ref 22–31)
CREAT SERPL-MCNC: 0.76 MG/DL (ref 0.7–1.3)
EOSINOPHIL # BLD AUTO: 0.2 THOU/UL (ref 0–0.4)
EOSINOPHIL NFR BLD AUTO: 1 % (ref 0–6)
ERYTHROCYTE [DISTWIDTH] IN BLOOD BY AUTOMATED COUNT: 14.4 % (ref 11–14.5)
GFR SERPL CREATININE-BSD FRML MDRD: >60 ML/MIN/1.73M2
GLUCOSE BLD-MCNC: 102 MG/DL (ref 70–125)
HCT VFR BLD AUTO: 39 % (ref 40–54)
HGB BLD-MCNC: 12.5 G/DL (ref 14–18)
LYMPHOCYTES # BLD AUTO: 1.1 THOU/UL (ref 0.8–4.4)
LYMPHOCYTES NFR BLD AUTO: 6 % (ref 20–40)
MCH RBC QN AUTO: 32 PG (ref 27–34)
MCHC RBC AUTO-ENTMCNC: 32.1 G/DL (ref 32–36)
MCV RBC AUTO: 100 FL (ref 80–100)
MONOCYTES # BLD AUTO: 0.8 THOU/UL (ref 0–0.9)
MONOCYTES NFR BLD AUTO: 5 % (ref 2–10)
NEUTROPHILS # BLD AUTO: 15.8 THOU/UL (ref 2–7.7)
NEUTROPHILS NFR BLD AUTO: 88 % (ref 50–70)
PLATELET # BLD AUTO: 214 THOU/UL (ref 140–440)
PMV BLD AUTO: 12.8 FL (ref 8.5–12.5)
POTASSIUM BLD-SCNC: 4.2 MMOL/L (ref 3.5–5)
PROT SERPL-MCNC: 6.4 G/DL (ref 6–8)
RBC # BLD AUTO: 3.91 MILL/UL (ref 4.4–6.2)
SODIUM SERPL-SCNC: 143 MMOL/L (ref 136–145)
WBC: 18 THOU/UL (ref 4–11)

## 2019-11-06 ENCOUNTER — INFUSION - HEALTHEAST (OUTPATIENT)
Dept: INFUSION THERAPY | Facility: HOSPITAL | Age: 59
End: 2019-11-06

## 2019-11-06 DIAGNOSIS — C90.00 MULTIPLE MYELOMA NOT HAVING ACHIEVED REMISSION (H): ICD-10-CM

## 2019-11-13 ENCOUNTER — INFUSION - HEALTHEAST (OUTPATIENT)
Dept: INFUSION THERAPY | Facility: HOSPITAL | Age: 59
End: 2019-11-13

## 2019-11-13 ENCOUNTER — COMMUNICATION - HEALTHEAST (OUTPATIENT)
Dept: ONCOLOGY | Facility: HOSPITAL | Age: 59
End: 2019-11-13

## 2019-11-13 DIAGNOSIS — C90.00 MULTIPLE MYELOMA NOT HAVING ACHIEVED REMISSION (H): ICD-10-CM

## 2019-11-15 ENCOUNTER — RECORDS - HEALTHEAST (OUTPATIENT)
Dept: ADMINISTRATIVE | Facility: OTHER | Age: 59
End: 2019-11-15

## 2019-11-19 ENCOUNTER — RECORDS - HEALTHEAST (OUTPATIENT)
Dept: ADMINISTRATIVE | Facility: OTHER | Age: 59
End: 2019-11-19

## 2019-11-19 ENCOUNTER — OFFICE VISIT - HEALTHEAST (OUTPATIENT)
Dept: PULMONOLOGY | Facility: OTHER | Age: 59
End: 2019-11-19

## 2019-11-19 DIAGNOSIS — I26.99 OTHER ACUTE PULMONARY EMBOLISM WITHOUT ACUTE COR PULMONALE (H): ICD-10-CM

## 2019-11-19 DIAGNOSIS — J18.9 RECURRENT PNEUMONIA: ICD-10-CM

## 2019-11-19 LAB
BASOPHILS # BLD AUTO: 0.1 THOU/UL (ref 0–0.2)
BASOPHILS NFR BLD AUTO: 1 % (ref 0–2)
EOSINOPHIL # BLD AUTO: 0.3 THOU/UL (ref 0–0.4)
EOSINOPHIL NFR BLD AUTO: 3 % (ref 0–6)
ERYTHROCYTE [DISTWIDTH] IN BLOOD BY AUTOMATED COUNT: 14.3 % (ref 11–14.5)
HCT VFR BLD AUTO: 37.6 % (ref 40–54)
HGB BLD-MCNC: 12 G/DL (ref 14–18)
LYMPHOCYTES # BLD AUTO: 1 THOU/UL (ref 0.8–4.4)
LYMPHOCYTES NFR BLD AUTO: 9 % (ref 20–40)
MCH RBC QN AUTO: 31.9 PG (ref 27–34)
MCHC RBC AUTO-ENTMCNC: 31.9 G/DL (ref 32–36)
MCV RBC AUTO: 100 FL (ref 80–100)
MONOCYTES # BLD AUTO: 1.2 THOU/UL (ref 0–0.9)
MONOCYTES NFR BLD AUTO: 11 % (ref 2–10)
NEUTROPHILS # BLD AUTO: 8.6 THOU/UL (ref 2–7.7)
NEUTROPHILS NFR BLD AUTO: 76 % (ref 50–70)
PLATELET # BLD AUTO: 391 THOU/UL (ref 140–440)
PMV BLD AUTO: 12.1 FL (ref 8.5–12.5)
RBC # BLD AUTO: 3.76 MILL/UL (ref 4.4–6.2)
WBC: 11.2 THOU/UL (ref 4–11)

## 2019-11-19 RX ORDER — LORAZEPAM 0.5 MG/1
0.5 TABLET ORAL 3 TIMES DAILY PRN
Status: SHIPPED | COMMUNITY
Start: 2019-11-19

## 2019-11-20 ENCOUNTER — INFUSION - HEALTHEAST (OUTPATIENT)
Dept: INFUSION THERAPY | Facility: HOSPITAL | Age: 59
End: 2019-11-20

## 2019-11-20 DIAGNOSIS — C90.00 MULTIPLE MYELOMA NOT HAVING ACHIEVED REMISSION (H): ICD-10-CM

## 2019-11-20 LAB — PROCALCITONIN SERPL-MCNC: 0.03 NG/ML (ref 0–0.49)

## 2019-11-21 ENCOUNTER — RECORDS - HEALTHEAST (OUTPATIENT)
Dept: ADMINISTRATIVE | Facility: OTHER | Age: 59
End: 2019-11-21

## 2019-11-27 ENCOUNTER — INFUSION - HEALTHEAST (OUTPATIENT)
Dept: INFUSION THERAPY | Facility: HOSPITAL | Age: 59
End: 2019-11-27

## 2019-11-27 ENCOUNTER — AMBULATORY - HEALTHEAST (OUTPATIENT)
Dept: INFUSION THERAPY | Facility: HOSPITAL | Age: 59
End: 2019-11-27

## 2019-11-27 DIAGNOSIS — C90.00 MULTIPLE MYELOMA NOT HAVING ACHIEVED REMISSION (H): ICD-10-CM

## 2019-11-27 LAB
ALBUMIN SERPL-MCNC: 3.4 G/DL (ref 3.5–5)
ALP SERPL-CCNC: 46 U/L (ref 45–120)
ALT SERPL W P-5'-P-CCNC: <9 U/L (ref 0–45)
ANION GAP SERPL CALCULATED.3IONS-SCNC: 7 MMOL/L (ref 5–18)
AST SERPL W P-5'-P-CCNC: 15 U/L (ref 0–40)
BASOPHILS # BLD AUTO: 0 THOU/UL (ref 0–0.2)
BASOPHILS NFR BLD AUTO: 1 % (ref 0–2)
BILIRUB SERPL-MCNC: 0.3 MG/DL (ref 0–1)
BUN SERPL-MCNC: 11 MG/DL (ref 8–22)
CALCIUM SERPL-MCNC: 9.7 MG/DL (ref 8.5–10.5)
CHLORIDE BLD-SCNC: 107 MMOL/L (ref 98–107)
CO2 SERPL-SCNC: 29 MMOL/L (ref 22–31)
CREAT SERPL-MCNC: 0.79 MG/DL (ref 0.7–1.3)
EOSINOPHIL # BLD AUTO: 0.3 THOU/UL (ref 0–0.4)
EOSINOPHIL NFR BLD AUTO: 4 % (ref 0–6)
ERYTHROCYTE [DISTWIDTH] IN BLOOD BY AUTOMATED COUNT: 14.2 % (ref 11–14.5)
GFR SERPL CREATININE-BSD FRML MDRD: >60 ML/MIN/1.73M2
GLUCOSE BLD-MCNC: 104 MG/DL (ref 70–125)
HCT VFR BLD AUTO: 36.4 % (ref 40–54)
HGB BLD-MCNC: 11.9 G/DL (ref 14–18)
IGA SERPL-MCNC: 566 MG/DL (ref 65–400)
IGA SERPL-MCNC: 838 MG/DL
IGM SERPL-MCNC: 39 MG/DL (ref 60–280)
LYMPHOCYTES # BLD AUTO: 1 THOU/UL (ref 0.8–4.4)
LYMPHOCYTES NFR BLD AUTO: 12 % (ref 20–40)
MCH RBC QN AUTO: 31.9 PG (ref 27–34)
MCHC RBC AUTO-ENTMCNC: 32.7 G/DL (ref 32–36)
MCV RBC AUTO: 98 FL (ref 80–100)
MONOCYTES # BLD AUTO: 0.8 THOU/UL (ref 0–0.9)
MONOCYTES NFR BLD AUTO: 10 % (ref 2–10)
NEUTROPHILS # BLD AUTO: 5.9 THOU/UL (ref 2–7.7)
NEUTROPHILS NFR BLD AUTO: 73 % (ref 50–70)
PLATELET # BLD AUTO: 402 THOU/UL (ref 140–440)
PMV BLD AUTO: 10.4 FL (ref 8.5–12.5)
POTASSIUM BLD-SCNC: 3.6 MMOL/L (ref 3.5–5)
PROT SERPL-MCNC: 7.2 G/DL (ref 6–8)
RBC # BLD AUTO: 3.73 MILL/UL (ref 4.4–6.2)
SODIUM SERPL-SCNC: 143 MMOL/L (ref 136–145)
WBC: 8.1 THOU/UL (ref 4–11)

## 2019-11-29 LAB
KAPPA LC FREE SER-MCNC: 2.25 MG/DL (ref 0.33–1.94)
KAPPA LC FREE/LAMBDA FREE SER NEPH: 2.25 {RATIO} (ref 0.26–1.65)
LAMBDA LC FREE SERPL-MCNC: 1 MG/DL (ref 0.57–2.63)

## 2019-12-02 LAB
ALBUMIN PERCENT: 55.9 % (ref 51–67)
ALBUMIN SERPL ELPH-MCNC: 3.7 G/DL (ref 3.2–4.7)
ALPHA 1 PERCENT: 3.3 % (ref 2–4)
ALPHA 2 PERCENT: 12.7 % (ref 5–13)
ALPHA1 GLOB SERPL ELPH-MCNC: 0.2 G/DL (ref 0.1–0.3)
ALPHA2 GLOB SERPL ELPH-MCNC: 0.9 G/DL (ref 0.4–0.9)
B-GLOBULIN SERPL ELPH-MCNC: 0.9 G/DL (ref 0.7–1.2)
BETA PERCENT: 13.3 % (ref 10–17)
GAMMA GLOB SERPL ELPH-MCNC: 1 G/DL (ref 0.6–1.4)
GAMMA GLOBULIN PERCENT: 14.8 % (ref 9–20)
M PROTEIN SERPL ELPH-MCNC: NORMAL G/DL
PATH ICD:: NORMAL
PROT PATTERN SERPL ELPH-IMP: NORMAL
PROT SERPL-MCNC: 6.7 G/DL (ref 6–8)
REVIEWING PATHOLOGIST: NORMAL

## 2019-12-04 ENCOUNTER — OFFICE VISIT - HEALTHEAST (OUTPATIENT)
Dept: ONCOLOGY | Facility: HOSPITAL | Age: 59
End: 2019-12-04

## 2019-12-04 ENCOUNTER — INFUSION - HEALTHEAST (OUTPATIENT)
Dept: INFUSION THERAPY | Facility: HOSPITAL | Age: 59
End: 2019-12-04

## 2019-12-04 DIAGNOSIS — C61 PROSTATE CANCER METASTATIC TO MULTIPLE SITES (H): ICD-10-CM

## 2019-12-04 DIAGNOSIS — C90.00 MULTIPLE MYELOMA NOT HAVING ACHIEVED REMISSION (H): ICD-10-CM

## 2019-12-04 LAB — PSA SERPL-MCNC: 4.2 NG/ML (ref 0–4.5)

## 2019-12-04 ASSESSMENT — MIFFLIN-ST. JEOR: SCORE: 1937.08

## 2019-12-05 ENCOUNTER — COMMUNICATION - HEALTHEAST (OUTPATIENT)
Dept: ONCOLOGY | Facility: HOSPITAL | Age: 59
End: 2019-12-05

## 2019-12-09 ENCOUNTER — HOSPITAL ENCOUNTER (OUTPATIENT)
Dept: CT IMAGING | Facility: CLINIC | Age: 59
Discharge: HOME | End: 2019-12-09
Attending: INTERNAL MEDICINE

## 2019-12-09 DIAGNOSIS — J18.9 RECURRENT PNEUMONIA: ICD-10-CM

## 2019-12-11 ENCOUNTER — INFUSION - HEALTHEAST (OUTPATIENT)
Dept: INFUSION THERAPY | Facility: HOSPITAL | Age: 59
End: 2019-12-11

## 2019-12-11 DIAGNOSIS — C90.00 MULTIPLE MYELOMA NOT HAVING ACHIEVED REMISSION (H): ICD-10-CM

## 2019-12-17 ENCOUNTER — RECORDS - HEALTHEAST (OUTPATIENT)
Dept: ADMINISTRATIVE | Facility: OTHER | Age: 59
End: 2019-12-17

## 2019-12-18 ENCOUNTER — AMBULATORY - HEALTHEAST (OUTPATIENT)
Dept: INFUSION THERAPY | Facility: HOSPITAL | Age: 59
End: 2019-12-18

## 2019-12-18 ENCOUNTER — INFUSION - HEALTHEAST (OUTPATIENT)
Dept: INFUSION THERAPY | Facility: HOSPITAL | Age: 59
End: 2019-12-18

## 2019-12-18 DIAGNOSIS — C90.00 MULTIPLE MYELOMA NOT HAVING ACHIEVED REMISSION (H): ICD-10-CM

## 2019-12-26 ENCOUNTER — AMBULATORY - HEALTHEAST (OUTPATIENT)
Dept: INFUSION THERAPY | Facility: HOSPITAL | Age: 59
End: 2019-12-26

## 2019-12-26 ENCOUNTER — RECORDS - HEALTHEAST (OUTPATIENT)
Dept: ADMINISTRATIVE | Facility: OTHER | Age: 59
End: 2019-12-26

## 2019-12-26 ENCOUNTER — INFUSION - HEALTHEAST (OUTPATIENT)
Dept: INFUSION THERAPY | Facility: HOSPITAL | Age: 59
End: 2019-12-26

## 2019-12-26 ENCOUNTER — OFFICE VISIT - HEALTHEAST (OUTPATIENT)
Dept: ONCOLOGY | Facility: HOSPITAL | Age: 59
End: 2019-12-26

## 2019-12-26 DIAGNOSIS — R11.0 NAUSEA: ICD-10-CM

## 2019-12-26 DIAGNOSIS — C61 PROSTATE CANCER METASTATIC TO MULTIPLE SITES (H): ICD-10-CM

## 2019-12-26 DIAGNOSIS — C90.00 MULTIPLE MYELOMA NOT HAVING ACHIEVED REMISSION (H): ICD-10-CM

## 2019-12-26 DIAGNOSIS — J32.8 OTHER CHRONIC SINUSITIS: ICD-10-CM

## 2019-12-26 LAB
ALBUMIN SERPL-MCNC: 3.7 G/DL (ref 3.5–5)
ALP SERPL-CCNC: 42 U/L (ref 45–120)
ALT SERPL W P-5'-P-CCNC: 11 U/L (ref 0–45)
ANION GAP SERPL CALCULATED.3IONS-SCNC: 10 MMOL/L (ref 5–18)
AST SERPL W P-5'-P-CCNC: 14 U/L (ref 0–40)
BASOPHILS # BLD AUTO: 0.1 THOU/UL (ref 0–0.2)
BASOPHILS NFR BLD AUTO: 1 % (ref 0–2)
BILIRUB SERPL-MCNC: 0.5 MG/DL (ref 0–1)
BUN SERPL-MCNC: 14 MG/DL (ref 8–22)
CALCIUM SERPL-MCNC: 9.6 MG/DL (ref 8.5–10.5)
CHLORIDE BLD-SCNC: 106 MMOL/L (ref 98–107)
CO2 SERPL-SCNC: 29 MMOL/L (ref 22–31)
CREAT SERPL-MCNC: 0.82 MG/DL (ref 0.7–1.3)
EOSINOPHIL # BLD AUTO: 0.3 THOU/UL (ref 0–0.4)
EOSINOPHIL NFR BLD AUTO: 6 % (ref 0–6)
ERYTHROCYTE [DISTWIDTH] IN BLOOD BY AUTOMATED COUNT: 14.2 % (ref 11–14.5)
GFR SERPL CREATININE-BSD FRML MDRD: >60 ML/MIN/1.73M2
GLUCOSE BLD-MCNC: 84 MG/DL (ref 70–125)
HCT VFR BLD AUTO: 36 % (ref 40–54)
HGB BLD-MCNC: 11.7 G/DL (ref 14–18)
LYMPHOCYTES # BLD AUTO: 1.1 THOU/UL (ref 0.8–4.4)
LYMPHOCYTES NFR BLD AUTO: 18 % (ref 20–40)
MCH RBC QN AUTO: 32.1 PG (ref 27–34)
MCHC RBC AUTO-ENTMCNC: 32.5 G/DL (ref 32–36)
MCV RBC AUTO: 99 FL (ref 80–100)
MONOCYTES # BLD AUTO: 0.8 THOU/UL (ref 0–0.9)
MONOCYTES NFR BLD AUTO: 13 % (ref 2–10)
NEUTROPHILS # BLD AUTO: 3.8 THOU/UL (ref 2–7.7)
NEUTROPHILS NFR BLD AUTO: 62 % (ref 50–70)
PLATELET # BLD AUTO: 272 THOU/UL (ref 140–440)
PMV BLD AUTO: 11 FL (ref 8.5–12.5)
POTASSIUM BLD-SCNC: 3.8 MMOL/L (ref 3.5–5)
PROT SERPL-MCNC: 6.8 G/DL (ref 6–8)
RBC # BLD AUTO: 3.64 MILL/UL (ref 4.4–6.2)
SODIUM SERPL-SCNC: 145 MMOL/L (ref 136–145)
WBC: 6.1 THOU/UL (ref 4–11)

## 2019-12-26 ASSESSMENT — MIFFLIN-ST. JEOR: SCORE: 1936.17

## 2019-12-31 ENCOUNTER — INFUSION - HEALTHEAST (OUTPATIENT)
Dept: INFUSION THERAPY | Facility: HOSPITAL | Age: 59
End: 2019-12-31

## 2019-12-31 DIAGNOSIS — C90.00 MULTIPLE MYELOMA NOT HAVING ACHIEVED REMISSION (H): ICD-10-CM

## 2019-12-31 RX ORDER — ACETAMINOPHEN 325 MG/1
650 TABLET ORAL EVERY 6 HOURS PRN
Status: SHIPPED | COMMUNITY
Start: 2019-12-31

## 2020-01-07 ENCOUNTER — INFUSION - HEALTHEAST (OUTPATIENT)
Dept: INFUSION THERAPY | Facility: HOSPITAL | Age: 60
End: 2020-01-07

## 2020-01-07 DIAGNOSIS — C90.00 MULTIPLE MYELOMA NOT HAVING ACHIEVED REMISSION (H): ICD-10-CM

## 2020-01-13 ENCOUNTER — COMMUNICATION - HEALTHEAST (OUTPATIENT)
Dept: ONCOLOGY | Facility: HOSPITAL | Age: 60
End: 2020-01-13

## 2020-01-14 ENCOUNTER — INFUSION - HEALTHEAST (OUTPATIENT)
Dept: INFUSION THERAPY | Facility: HOSPITAL | Age: 60
End: 2020-01-14

## 2020-01-14 ENCOUNTER — AMBULATORY - HEALTHEAST (OUTPATIENT)
Dept: INFUSION THERAPY | Facility: HOSPITAL | Age: 60
End: 2020-01-14

## 2020-01-14 ENCOUNTER — COMMUNICATION - HEALTHEAST (OUTPATIENT)
Dept: ONCOLOGY | Facility: HOSPITAL | Age: 60
End: 2020-01-14

## 2020-01-14 DIAGNOSIS — C90.00 MULTIPLE MYELOMA NOT HAVING ACHIEVED REMISSION (H): ICD-10-CM

## 2020-01-14 LAB — PSA SERPL-MCNC: 3.3 NG/ML (ref 0–4.5)

## 2020-01-21 ENCOUNTER — OFFICE VISIT - HEALTHEAST (OUTPATIENT)
Dept: ONCOLOGY | Facility: HOSPITAL | Age: 60
End: 2020-01-21

## 2020-01-21 ENCOUNTER — INFUSION - HEALTHEAST (OUTPATIENT)
Dept: INFUSION THERAPY | Facility: HOSPITAL | Age: 60
End: 2020-01-21

## 2020-01-21 ENCOUNTER — AMBULATORY - HEALTHEAST (OUTPATIENT)
Dept: INFUSION THERAPY | Facility: HOSPITAL | Age: 60
End: 2020-01-21

## 2020-01-21 DIAGNOSIS — C90.00 MULTIPLE MYELOMA NOT HAVING ACHIEVED REMISSION (H): ICD-10-CM

## 2020-01-21 DIAGNOSIS — C61 PROSTATE CANCER METASTATIC TO MULTIPLE SITES (H): ICD-10-CM

## 2020-01-21 LAB
ALBUMIN SERPL-MCNC: 3.8 G/DL (ref 3.5–5)
ALP SERPL-CCNC: 45 U/L (ref 45–120)
ALT SERPL W P-5'-P-CCNC: <9 U/L (ref 0–45)
ANION GAP SERPL CALCULATED.3IONS-SCNC: 8 MMOL/L (ref 5–18)
AST SERPL W P-5'-P-CCNC: 15 U/L (ref 0–40)
BASOPHILS # BLD AUTO: 0 THOU/UL (ref 0–0.2)
BASOPHILS NFR BLD AUTO: 0 % (ref 0–2)
BILIRUB SERPL-MCNC: 0.3 MG/DL (ref 0–1)
BUN SERPL-MCNC: 15 MG/DL (ref 8–22)
CALCIUM SERPL-MCNC: 9.5 MG/DL (ref 8.5–10.5)
CHLORIDE BLD-SCNC: 105 MMOL/L (ref 98–107)
CO2 SERPL-SCNC: 27 MMOL/L (ref 22–31)
CREAT SERPL-MCNC: 0.78 MG/DL (ref 0.7–1.3)
EOSINOPHIL # BLD AUTO: 0.2 THOU/UL (ref 0–0.4)
EOSINOPHIL NFR BLD AUTO: 2 % (ref 0–6)
ERYTHROCYTE [DISTWIDTH] IN BLOOD BY AUTOMATED COUNT: 14.2 % (ref 11–14.5)
GFR SERPL CREATININE-BSD FRML MDRD: >60 ML/MIN/1.73M2
GLUCOSE BLD-MCNC: 90 MG/DL (ref 70–125)
HCT VFR BLD AUTO: 38.2 % (ref 40–54)
HGB BLD-MCNC: 12.1 G/DL (ref 14–18)
IGA SERPL-MCNC: 518 MG/DL (ref 65–400)
IGA SERPL-MCNC: 868 MG/DL
IGM SERPL-MCNC: 33 MG/DL (ref 60–280)
LYMPHOCYTES # BLD AUTO: 1.1 THOU/UL (ref 0.8–4.4)
LYMPHOCYTES NFR BLD AUTO: 15 % (ref 20–40)
MCH RBC QN AUTO: 32.3 PG (ref 27–34)
MCHC RBC AUTO-ENTMCNC: 31.7 G/DL (ref 32–36)
MCV RBC AUTO: 102 FL (ref 80–100)
MONOCYTES # BLD AUTO: 0.9 THOU/UL (ref 0–0.9)
MONOCYTES NFR BLD AUTO: 12 % (ref 2–10)
NEUTROPHILS # BLD AUTO: 5.2 THOU/UL (ref 2–7.7)
NEUTROPHILS NFR BLD AUTO: 70 % (ref 50–70)
PLATELET # BLD AUTO: 251 THOU/UL (ref 140–440)
PMV BLD AUTO: 12.1 FL (ref 8.5–12.5)
POTASSIUM BLD-SCNC: 3.7 MMOL/L (ref 3.5–5)
PROT SERPL-MCNC: 6.8 G/DL (ref 6–8)
RBC # BLD AUTO: 3.75 MILL/UL (ref 4.4–6.2)
SODIUM SERPL-SCNC: 140 MMOL/L (ref 136–145)
WBC: 7.5 THOU/UL (ref 4–11)

## 2020-01-22 LAB
ALBUMIN PERCENT: 60.8 % (ref 51–67)
ALBUMIN SERPL ELPH-MCNC: 4.1 G/DL (ref 3.2–4.7)
ALPHA 1 PERCENT: 2.6 % (ref 2–4)
ALPHA 2 PERCENT: 10.3 % (ref 5–13)
ALPHA1 GLOB SERPL ELPH-MCNC: 0.2 G/DL (ref 0.1–0.3)
ALPHA2 GLOB SERPL ELPH-MCNC: 0.7 G/DL (ref 0.4–0.9)
B-GLOBULIN SERPL ELPH-MCNC: 0.8 G/DL (ref 0.7–1.2)
BETA PERCENT: 12 % (ref 10–17)
GAMMA GLOB SERPL ELPH-MCNC: 1 G/DL (ref 0.6–1.4)
GAMMA GLOBULIN PERCENT: 14.3 % (ref 9–20)
KAPPA LC FREE SER-MCNC: 1.92 MG/DL (ref 0.33–1.94)
KAPPA LC FREE/LAMBDA FREE SER NEPH: 2.49 {RATIO} (ref 0.26–1.65)
LAMBDA LC FREE SERPL-MCNC: 0.77 MG/DL (ref 0.57–2.63)
M PROTEIN SERPL ELPH-MCNC: NORMAL G/DL
PATH ICD:: NORMAL
PROT PATTERN SERPL ELPH-IMP: NORMAL
PROT SERPL-MCNC: 6.8 G/DL (ref 6–8)
REVIEWING PATHOLOGIST: NORMAL

## 2020-01-28 ENCOUNTER — INFUSION - HEALTHEAST (OUTPATIENT)
Dept: INFUSION THERAPY | Facility: HOSPITAL | Age: 60
End: 2020-01-28

## 2020-01-28 DIAGNOSIS — C90.00 MULTIPLE MYELOMA NOT HAVING ACHIEVED REMISSION (H): ICD-10-CM

## 2020-02-04 ENCOUNTER — AMBULATORY - HEALTHEAST (OUTPATIENT)
Dept: ONCOLOGY | Facility: HOSPITAL | Age: 60
End: 2020-02-04

## 2020-02-04 ENCOUNTER — INFUSION - HEALTHEAST (OUTPATIENT)
Dept: INFUSION THERAPY | Facility: HOSPITAL | Age: 60
End: 2020-02-04

## 2020-02-04 DIAGNOSIS — C90.00 MULTIPLE MYELOMA NOT HAVING ACHIEVED REMISSION (H): ICD-10-CM

## 2020-02-10 ENCOUNTER — RECORDS - HEALTHEAST (OUTPATIENT)
Dept: ADMINISTRATIVE | Facility: OTHER | Age: 60
End: 2020-02-10

## 2020-02-11 ENCOUNTER — INFUSION - HEALTHEAST (OUTPATIENT)
Dept: INFUSION THERAPY | Facility: HOSPITAL | Age: 60
End: 2020-02-11

## 2020-02-11 DIAGNOSIS — C90.00 MULTIPLE MYELOMA NOT HAVING ACHIEVED REMISSION (H): ICD-10-CM

## 2020-02-12 ENCOUNTER — RECORDS - HEALTHEAST (OUTPATIENT)
Dept: ADMINISTRATIVE | Facility: OTHER | Age: 60
End: 2020-02-12

## 2020-02-18 ENCOUNTER — INFUSION - HEALTHEAST (OUTPATIENT)
Dept: INFUSION THERAPY | Facility: HOSPITAL | Age: 60
End: 2020-02-18

## 2020-02-18 ENCOUNTER — OFFICE VISIT - HEALTHEAST (OUTPATIENT)
Dept: ONCOLOGY | Facility: HOSPITAL | Age: 60
End: 2020-02-18

## 2020-02-18 ENCOUNTER — AMBULATORY - HEALTHEAST (OUTPATIENT)
Dept: INFUSION THERAPY | Facility: HOSPITAL | Age: 60
End: 2020-02-18

## 2020-02-18 DIAGNOSIS — C90.00 MULTIPLE MYELOMA NOT HAVING ACHIEVED REMISSION (H): ICD-10-CM

## 2020-02-18 DIAGNOSIS — C61 PROSTATE CANCER METASTATIC TO MULTIPLE SITES (H): ICD-10-CM

## 2020-02-18 DIAGNOSIS — G62.0 PERIPHERAL NEUROPATHY DUE TO CHEMOTHERAPY (H): ICD-10-CM

## 2020-02-18 DIAGNOSIS — T45.1X5A PERIPHERAL NEUROPATHY DUE TO CHEMOTHERAPY (H): ICD-10-CM

## 2020-02-18 LAB
ALBUMIN SERPL-MCNC: 3.9 G/DL (ref 3.5–5)
ALP SERPL-CCNC: 41 U/L (ref 45–120)
ALT SERPL W P-5'-P-CCNC: 12 U/L (ref 0–45)
ANION GAP SERPL CALCULATED.3IONS-SCNC: 10 MMOL/L (ref 5–18)
AST SERPL W P-5'-P-CCNC: 17 U/L (ref 0–40)
BASOPHILS # BLD AUTO: 0 THOU/UL (ref 0–0.2)
BASOPHILS NFR BLD AUTO: 1 % (ref 0–2)
BILIRUB SERPL-MCNC: 0.3 MG/DL (ref 0–1)
BUN SERPL-MCNC: 20 MG/DL (ref 8–22)
CALCIUM SERPL-MCNC: 9.7 MG/DL (ref 8.5–10.5)
CHLORIDE BLD-SCNC: 106 MMOL/L (ref 98–107)
CO2 SERPL-SCNC: 27 MMOL/L (ref 22–31)
CREAT SERPL-MCNC: 0.85 MG/DL (ref 0.7–1.3)
EOSINOPHIL # BLD AUTO: 0.2 THOU/UL (ref 0–0.4)
EOSINOPHIL NFR BLD AUTO: 3 % (ref 0–6)
ERYTHROCYTE [DISTWIDTH] IN BLOOD BY AUTOMATED COUNT: 13.9 % (ref 11–14.5)
GFR SERPL CREATININE-BSD FRML MDRD: >60 ML/MIN/1.73M2
GLUCOSE BLD-MCNC: 110 MG/DL (ref 70–125)
HCT VFR BLD AUTO: 37.5 % (ref 40–54)
HGB BLD-MCNC: 12.3 G/DL (ref 14–18)
LYMPHOCYTES # BLD AUTO: 1.2 THOU/UL (ref 0.8–4.4)
LYMPHOCYTES NFR BLD AUTO: 16 % (ref 20–40)
MAGNESIUM SERPL-MCNC: 2 MG/DL (ref 1.8–2.6)
MCH RBC QN AUTO: 33.3 PG (ref 27–34)
MCHC RBC AUTO-ENTMCNC: 32.8 G/DL (ref 32–36)
MCV RBC AUTO: 102 FL (ref 80–100)
MONOCYTES # BLD AUTO: 0.9 THOU/UL (ref 0–0.9)
MONOCYTES NFR BLD AUTO: 11 % (ref 2–10)
NEUTROPHILS # BLD AUTO: 5.5 THOU/UL (ref 2–7.7)
NEUTROPHILS NFR BLD AUTO: 70 % (ref 50–70)
PLATELET # BLD AUTO: 289 THOU/UL (ref 140–440)
PMV BLD AUTO: 11.4 FL (ref 8.5–12.5)
POTASSIUM BLD-SCNC: 4 MMOL/L (ref 3.5–5)
PROT SERPL-MCNC: 7.2 G/DL (ref 6–8)
RBC # BLD AUTO: 3.69 MILL/UL (ref 4.4–6.2)
SODIUM SERPL-SCNC: 143 MMOL/L (ref 136–145)
WBC: 7.9 THOU/UL (ref 4–11)

## 2020-02-18 RX ORDER — SIMETHICONE 125 MG
125 TABLET,CHEWABLE ORAL 3 TIMES DAILY PRN
Status: SHIPPED | COMMUNITY
Start: 2020-02-18

## 2020-02-18 RX ORDER — CALCIUM POLYCARBOPHIL 625 MG
625 TABLET ORAL 2 TIMES DAILY
Status: SHIPPED | COMMUNITY
Start: 2020-02-18

## 2020-02-18 ASSESSMENT — MIFFLIN-ST. JEOR: SCORE: 1922.56

## 2020-02-25 ENCOUNTER — INFUSION - HEALTHEAST (OUTPATIENT)
Dept: INFUSION THERAPY | Facility: HOSPITAL | Age: 60
End: 2020-02-25

## 2020-02-25 DIAGNOSIS — C90.00 MULTIPLE MYELOMA NOT HAVING ACHIEVED REMISSION (H): ICD-10-CM

## 2020-03-02 ENCOUNTER — RECORDS - HEALTHEAST (OUTPATIENT)
Dept: ADMINISTRATIVE | Facility: OTHER | Age: 60
End: 2020-03-02

## 2020-03-10 ENCOUNTER — AMBULATORY - HEALTHEAST (OUTPATIENT)
Dept: INFUSION THERAPY | Facility: HOSPITAL | Age: 60
End: 2020-03-10

## 2020-03-10 ENCOUNTER — INFUSION - HEALTHEAST (OUTPATIENT)
Dept: INFUSION THERAPY | Facility: HOSPITAL | Age: 60
End: 2020-03-10

## 2020-03-10 DIAGNOSIS — C90.00 MULTIPLE MYELOMA NOT HAVING ACHIEVED REMISSION (H): ICD-10-CM

## 2020-03-10 LAB
ALBUMIN SERPL-MCNC: 3.8 G/DL (ref 3.5–5)
ALP SERPL-CCNC: 39 U/L (ref 45–120)
ALT SERPL W P-5'-P-CCNC: <9 U/L (ref 0–45)
ANION GAP SERPL CALCULATED.3IONS-SCNC: 8 MMOL/L (ref 5–18)
AST SERPL W P-5'-P-CCNC: 14 U/L (ref 0–40)
BASOPHILS # BLD AUTO: 0.1 THOU/UL (ref 0–0.2)
BASOPHILS NFR BLD AUTO: 1 % (ref 0–2)
BILIRUB SERPL-MCNC: 0.3 MG/DL (ref 0–1)
BUN SERPL-MCNC: 17 MG/DL (ref 8–22)
CALCIUM SERPL-MCNC: 9.6 MG/DL (ref 8.5–10.5)
CHLORIDE BLD-SCNC: 104 MMOL/L (ref 98–107)
CO2 SERPL-SCNC: 29 MMOL/L (ref 22–31)
CREAT SERPL-MCNC: 0.85 MG/DL (ref 0.7–1.3)
EOSINOPHIL # BLD AUTO: 0.3 THOU/UL (ref 0–0.4)
EOSINOPHIL NFR BLD AUTO: 4 % (ref 0–6)
ERYTHROCYTE [DISTWIDTH] IN BLOOD BY AUTOMATED COUNT: 14.4 % (ref 11–14.5)
GFR SERPL CREATININE-BSD FRML MDRD: >60 ML/MIN/1.73M2
GLUCOSE BLD-MCNC: 91 MG/DL (ref 70–125)
HCT VFR BLD AUTO: 35.1 % (ref 40–54)
HGB BLD-MCNC: 11.6 G/DL (ref 14–18)
IGA SERPL-MCNC: 524 MG/DL (ref 65–400)
IGA SERPL-MCNC: 866 MG/DL
IGM SERPL-MCNC: 30 MG/DL (ref 60–280)
LYMPHOCYTES # BLD AUTO: 1.7 THOU/UL (ref 0.8–4.4)
LYMPHOCYTES NFR BLD AUTO: 23 % (ref 20–40)
MCH RBC QN AUTO: 32.9 PG (ref 27–34)
MCHC RBC AUTO-ENTMCNC: 33 G/DL (ref 32–36)
MCV RBC AUTO: 99 FL (ref 80–100)
MONOCYTES # BLD AUTO: 0.7 THOU/UL (ref 0–0.9)
MONOCYTES NFR BLD AUTO: 10 % (ref 2–10)
NEUTROPHILS # BLD AUTO: 4.7 THOU/UL (ref 2–7.7)
NEUTROPHILS NFR BLD AUTO: 63 % (ref 50–70)
PLATELET # BLD AUTO: 355 THOU/UL (ref 140–440)
PMV BLD AUTO: 9.6 FL (ref 8.5–12.5)
POTASSIUM BLD-SCNC: 3.9 MMOL/L (ref 3.5–5)
PROT SERPL-MCNC: 7.1 G/DL (ref 6–8)
RBC # BLD AUTO: 3.53 MILL/UL (ref 4.4–6.2)
SODIUM SERPL-SCNC: 141 MMOL/L (ref 136–145)
WBC: 7.5 THOU/UL (ref 4–11)

## 2020-03-11 LAB
ALBUMIN PERCENT: 61.2 % (ref 51–67)
ALBUMIN SERPL ELPH-MCNC: 4 G/DL (ref 3.2–4.7)
ALPHA 1 PERCENT: 2.8 % (ref 2–4)
ALPHA 2 PERCENT: 10.8 % (ref 5–13)
ALPHA1 GLOB SERPL ELPH-MCNC: 0.2 G/DL (ref 0.1–0.3)
ALPHA2 GLOB SERPL ELPH-MCNC: 0.7 G/DL (ref 0.4–0.9)
B-GLOBULIN SERPL ELPH-MCNC: 0.8 G/DL (ref 0.7–1.2)
BETA PERCENT: 12.1 % (ref 10–17)
GAMMA GLOB SERPL ELPH-MCNC: 0.9 G/DL (ref 0.6–1.4)
GAMMA GLOBULIN PERCENT: 13.1 % (ref 9–20)
KAPPA LC FREE SER-MCNC: 2.16 MG/DL (ref 0.33–1.94)
KAPPA LC FREE/LAMBDA FREE SER NEPH: 3.38 {RATIO} (ref 0.26–1.65)
LAMBDA LC FREE SERPL-MCNC: 0.64 MG/DL (ref 0.57–2.63)
M PROTEIN SERPL ELPH-MCNC: 0.2 G/DL
PATH ICD:: NORMAL
PROT PATTERN SERPL ELPH-IMP: NORMAL
PROT SERPL-MCNC: 6.6 G/DL (ref 6–8)
REVIEWING PATHOLOGIST: NORMAL

## 2020-03-17 ENCOUNTER — INFUSION - HEALTHEAST (OUTPATIENT)
Dept: INFUSION THERAPY | Facility: HOSPITAL | Age: 60
End: 2020-03-17

## 2020-03-17 ENCOUNTER — OFFICE VISIT - HEALTHEAST (OUTPATIENT)
Dept: ONCOLOGY | Facility: HOSPITAL | Age: 60
End: 2020-03-17

## 2020-03-17 ENCOUNTER — AMBULATORY - HEALTHEAST (OUTPATIENT)
Dept: INFUSION THERAPY | Facility: HOSPITAL | Age: 60
End: 2020-03-17

## 2020-03-17 DIAGNOSIS — C90.00 MULTIPLE MYELOMA NOT HAVING ACHIEVED REMISSION (H): ICD-10-CM

## 2020-03-17 DIAGNOSIS — C61 PROSTATE CANCER METASTATIC TO MULTIPLE SITES (H): ICD-10-CM

## 2020-03-17 LAB
ALBUMIN SERPL-MCNC: 3.8 G/DL (ref 3.5–5)
ALP SERPL-CCNC: 49 U/L (ref 45–120)
ALT SERPL W P-5'-P-CCNC: 11 U/L (ref 0–45)
ANION GAP SERPL CALCULATED.3IONS-SCNC: 9 MMOL/L (ref 5–18)
AST SERPL W P-5'-P-CCNC: 16 U/L (ref 0–40)
BASOPHILS # BLD AUTO: 0 THOU/UL (ref 0–0.2)
BASOPHILS NFR BLD AUTO: 1 % (ref 0–2)
BILIRUB SERPL-MCNC: 0.3 MG/DL (ref 0–1)
BUN SERPL-MCNC: 17 MG/DL (ref 8–22)
CALCIUM SERPL-MCNC: 9.7 MG/DL (ref 8.5–10.5)
CHLORIDE BLD-SCNC: 104 MMOL/L (ref 98–107)
CO2 SERPL-SCNC: 30 MMOL/L (ref 22–31)
CREAT SERPL-MCNC: 0.99 MG/DL (ref 0.7–1.3)
EOSINOPHIL # BLD AUTO: 0.2 THOU/UL (ref 0–0.4)
EOSINOPHIL NFR BLD AUTO: 3 % (ref 0–6)
ERYTHROCYTE [DISTWIDTH] IN BLOOD BY AUTOMATED COUNT: 14.4 % (ref 11–14.5)
GFR SERPL CREATININE-BSD FRML MDRD: >60 ML/MIN/1.73M2
GLUCOSE BLD-MCNC: 74 MG/DL (ref 70–125)
HCT VFR BLD AUTO: 35.6 % (ref 40–54)
HGB BLD-MCNC: 11.6 G/DL (ref 14–18)
LYMPHOCYTES # BLD AUTO: 1.1 THOU/UL (ref 0.8–4.4)
LYMPHOCYTES NFR BLD AUTO: 18 % (ref 20–40)
MCH RBC QN AUTO: 33 PG (ref 27–34)
MCHC RBC AUTO-ENTMCNC: 32.6 G/DL (ref 32–36)
MCV RBC AUTO: 101 FL (ref 80–100)
MONOCYTES # BLD AUTO: 0.8 THOU/UL (ref 0–0.9)
MONOCYTES NFR BLD AUTO: 12 % (ref 2–10)
NEUTROPHILS # BLD AUTO: 4.3 THOU/UL (ref 2–7.7)
NEUTROPHILS NFR BLD AUTO: 66 % (ref 50–70)
PLATELET # BLD AUTO: 283 THOU/UL (ref 140–440)
PMV BLD AUTO: 10.4 FL (ref 8.5–12.5)
POTASSIUM BLD-SCNC: 3.8 MMOL/L (ref 3.5–5)
PROT SERPL-MCNC: 7.4 G/DL (ref 6–8)
PSA SERPL-MCNC: 6.4 NG/ML (ref 0–4.5)
RBC # BLD AUTO: 3.51 MILL/UL (ref 4.4–6.2)
SODIUM SERPL-SCNC: 143 MMOL/L (ref 136–145)
WBC: 6.4 THOU/UL (ref 4–11)

## 2020-03-17 ASSESSMENT — MIFFLIN-ST. JEOR: SCORE: 1913.49

## 2020-03-19 ENCOUNTER — AMBULATORY - HEALTHEAST (OUTPATIENT)
Dept: ONCOLOGY | Facility: HOSPITAL | Age: 60
End: 2020-03-19

## 2020-03-24 ENCOUNTER — INFUSION - HEALTHEAST (OUTPATIENT)
Dept: INFUSION THERAPY | Facility: HOSPITAL | Age: 60
End: 2020-03-24

## 2020-03-24 DIAGNOSIS — C90.00 MULTIPLE MYELOMA NOT HAVING ACHIEVED REMISSION (H): ICD-10-CM

## 2020-03-26 ENCOUNTER — COMMUNICATION - HEALTHEAST (OUTPATIENT)
Dept: ONCOLOGY | Facility: HOSPITAL | Age: 60
End: 2020-03-26

## 2020-03-30 ENCOUNTER — AMBULATORY - HEALTHEAST (OUTPATIENT)
Dept: ONCOLOGY | Facility: CLINIC | Age: 60
End: 2020-03-30

## 2020-03-30 DIAGNOSIS — C90.00 MULTIPLE MYELOMA NOT HAVING ACHIEVED REMISSION (H): ICD-10-CM

## 2020-03-31 ENCOUNTER — INFUSION - HEALTHEAST (OUTPATIENT)
Dept: INFUSION THERAPY | Facility: HOSPITAL | Age: 60
End: 2020-03-31

## 2020-03-31 DIAGNOSIS — C90.00 MULTIPLE MYELOMA NOT HAVING ACHIEVED REMISSION (H): ICD-10-CM

## 2020-04-07 ENCOUNTER — INFUSION - HEALTHEAST (OUTPATIENT)
Dept: INFUSION THERAPY | Facility: HOSPITAL | Age: 60
End: 2020-04-07

## 2020-04-07 DIAGNOSIS — C90.00 MULTIPLE MYELOMA NOT HAVING ACHIEVED REMISSION (H): ICD-10-CM

## 2020-04-10 ENCOUNTER — COMMUNICATION - HEALTHEAST (OUTPATIENT)
Dept: ONCOLOGY | Facility: HOSPITAL | Age: 60
End: 2020-04-10

## 2021-05-26 VITALS
HEART RATE: 103 BPM | DIASTOLIC BLOOD PRESSURE: 77 MMHG | TEMPERATURE: 97.6 F | OXYGEN SATURATION: 96 % | SYSTOLIC BLOOD PRESSURE: 118 MMHG

## 2021-05-26 VITALS
HEART RATE: 78 BPM | SYSTOLIC BLOOD PRESSURE: 120 MMHG | DIASTOLIC BLOOD PRESSURE: 84 MMHG | OXYGEN SATURATION: 95 % | TEMPERATURE: 98.4 F

## 2021-05-26 VITALS
TEMPERATURE: 98 F | SYSTOLIC BLOOD PRESSURE: 121 MMHG | HEART RATE: 86 BPM | DIASTOLIC BLOOD PRESSURE: 89 MMHG | OXYGEN SATURATION: 95 %

## 2021-05-26 VITALS
TEMPERATURE: 98.3 F | DIASTOLIC BLOOD PRESSURE: 61 MMHG | OXYGEN SATURATION: 93 % | SYSTOLIC BLOOD PRESSURE: 102 MMHG | HEART RATE: 77 BPM

## 2021-05-26 VITALS
SYSTOLIC BLOOD PRESSURE: 117 MMHG | OXYGEN SATURATION: 91 % | DIASTOLIC BLOOD PRESSURE: 75 MMHG | TEMPERATURE: 98.5 F | HEART RATE: 103 BPM

## 2021-05-26 VITALS
SYSTOLIC BLOOD PRESSURE: 128 MMHG | OXYGEN SATURATION: 93 % | HEART RATE: 96 BPM | TEMPERATURE: 98.6 F | DIASTOLIC BLOOD PRESSURE: 79 MMHG

## 2021-05-26 VITALS
DIASTOLIC BLOOD PRESSURE: 76 MMHG | SYSTOLIC BLOOD PRESSURE: 128 MMHG | HEART RATE: 85 BPM | TEMPERATURE: 97.8 F | OXYGEN SATURATION: 94 %

## 2021-05-26 VITALS
DIASTOLIC BLOOD PRESSURE: 67 MMHG | SYSTOLIC BLOOD PRESSURE: 113 MMHG | OXYGEN SATURATION: 93 % | TEMPERATURE: 98.2 F | HEART RATE: 81 BPM

## 2021-05-26 VITALS
SYSTOLIC BLOOD PRESSURE: 138 MMHG | DIASTOLIC BLOOD PRESSURE: 88 MMHG | RESPIRATION RATE: 24 BRPM | HEART RATE: 80 BPM | OXYGEN SATURATION: 91 %

## 2021-05-26 VITALS
SYSTOLIC BLOOD PRESSURE: 131 MMHG | HEART RATE: 89 BPM | DIASTOLIC BLOOD PRESSURE: 92 MMHG | OXYGEN SATURATION: 92 % | TEMPERATURE: 98.4 F

## 2021-05-26 VITALS
TEMPERATURE: 98.1 F | DIASTOLIC BLOOD PRESSURE: 85 MMHG | HEART RATE: 80 BPM | SYSTOLIC BLOOD PRESSURE: 125 MMHG | OXYGEN SATURATION: 94 %

## 2021-05-26 VITALS
OXYGEN SATURATION: 92 % | HEART RATE: 90 BPM | DIASTOLIC BLOOD PRESSURE: 81 MMHG | SYSTOLIC BLOOD PRESSURE: 116 MMHG | TEMPERATURE: 97.6 F

## 2021-05-26 VITALS
OXYGEN SATURATION: 94 % | SYSTOLIC BLOOD PRESSURE: 119 MMHG | TEMPERATURE: 98.4 F | HEART RATE: 84 BPM | DIASTOLIC BLOOD PRESSURE: 80 MMHG

## 2021-05-27 VITALS
OXYGEN SATURATION: 93 % | HEART RATE: 69 BPM | SYSTOLIC BLOOD PRESSURE: 119 MMHG | DIASTOLIC BLOOD PRESSURE: 86 MMHG | TEMPERATURE: 97.6 F

## 2021-05-27 VITALS
TEMPERATURE: 98.4 F | DIASTOLIC BLOOD PRESSURE: 88 MMHG | SYSTOLIC BLOOD PRESSURE: 125 MMHG | RESPIRATION RATE: 18 BRPM | HEART RATE: 73 BPM

## 2021-05-27 VITALS
DIASTOLIC BLOOD PRESSURE: 95 MMHG | SYSTOLIC BLOOD PRESSURE: 150 MMHG | TEMPERATURE: 98.2 F | OXYGEN SATURATION: 96 % | HEART RATE: 65 BPM | RESPIRATION RATE: 18 BRPM

## 2021-05-27 VITALS
TEMPERATURE: 98 F | DIASTOLIC BLOOD PRESSURE: 91 MMHG | HEART RATE: 74 BPM | OXYGEN SATURATION: 97 % | SYSTOLIC BLOOD PRESSURE: 157 MMHG

## 2021-05-27 VITALS
TEMPERATURE: 98.4 F | SYSTOLIC BLOOD PRESSURE: 128 MMHG | HEART RATE: 77 BPM | DIASTOLIC BLOOD PRESSURE: 84 MMHG | OXYGEN SATURATION: 97 %

## 2021-05-27 VITALS — TEMPERATURE: 97.6 F | HEART RATE: 70 BPM | DIASTOLIC BLOOD PRESSURE: 88 MMHG | SYSTOLIC BLOOD PRESSURE: 134 MMHG

## 2021-05-27 VITALS — HEART RATE: 76 BPM | DIASTOLIC BLOOD PRESSURE: 83 MMHG | SYSTOLIC BLOOD PRESSURE: 128 MMHG | TEMPERATURE: 97.5 F

## 2021-05-27 VITALS
DIASTOLIC BLOOD PRESSURE: 99 MMHG | SYSTOLIC BLOOD PRESSURE: 158 MMHG | TEMPERATURE: 97.9 F | OXYGEN SATURATION: 97 % | HEART RATE: 63 BPM

## 2021-05-27 VITALS
HEART RATE: 66 BPM | DIASTOLIC BLOOD PRESSURE: 89 MMHG | SYSTOLIC BLOOD PRESSURE: 133 MMHG | TEMPERATURE: 97.8 F | OXYGEN SATURATION: 100 %

## 2021-05-27 NOTE — PROGRESS NOTES
Pt arrived via wheelchair to clinic for Cycle # 11 Day # 22 of his chemotherapy regimen.  Administered SQ Velcade into LLQ of ABD per MD order.  Pt tolerated procedure well, no s/s of bleeding or swelling at site.  Pt left with guards via wheelchair.

## 2021-05-27 NOTE — PROGRESS NOTES
Pt came into infusion clinic for his Velcade injection as ordered. Pt tolerated this well and left infusion clinic via WC accompanied by guards.

## 2021-05-27 NOTE — PROGRESS NOTES
Pt here for velcade injection, which was given without incident in LL abdomen. Pt ststed that after last weeks injections he threw up a couple times that evening. None since. Pt d/c in wheel chair to lobby with DOC guards.

## 2021-05-27 NOTE — PROGRESS NOTES
Pt here today for Velcade and Leupron injections. He tolerated both well and left clinic in wheelchair with law enforcement.

## 2021-05-28 NOTE — PROGRESS NOTES
Patient is here for labs, provider and treatment for Multiple myeloma not having achieved remission.

## 2021-05-28 NOTE — PROGRESS NOTES
Amauri came to chemo infusion  via wheelchair accompanied by DOC guards after lab and NP apointments for his next dose of Velcade. Velcade was given sq into his RUQ abdomen.  He tolerated the injection well and site was covered with a bandaid.  Amauri d/c from clinic via wheelchair accompanied by DOC guards.

## 2021-05-28 NOTE — PROGRESS NOTES
Amauri came to clinic via wheelchair accompanied by DOC guards for his next dose of Velcade. Labs drawn and results noted.  He met provision for treatment.   Pt assessed. Velcade was given sq into his LLQ abdomen.  He tolerated the injection well and site was covered with a bandaid.  Amauri d/c from clinic via wheelchair accompanied by DOC guards.

## 2021-05-28 NOTE — PROGRESS NOTES
Amauri came to clinic via wheelchair accompanied by DOC guards for his next dose of Velcade. Pt assessed. Velcade was given sq into his LUQ abdomen.  He tolerated the injection well and site was covered with a bandaid.  Amauri d/c from clinic via wheelchair accompanied by DOC guards.

## 2021-05-28 NOTE — PROGRESS NOTES
[facility] Progress Note    Patient: Amauri Shell  MRN: [id number]  Date of Service: 05/08/19          Reason for Visit    Myeloma  Prostate cancer    Assessment and Plan    1.  Multiple Myeloma.  Did receive radiation to this bone lesion.  He also started on systemic therapy.  Our Pathologist did review his case and they do feel that he has myeloma.  We will continue his weekly Velcade, dexamethasone.  He will continue 10 mg of dexamethasone weekly. We will monitor his myeloma labs as well every 3 months.  They have been stable.  He has side effects from this treatment we will likely put him on observation.     2.  Metastatic prostate cancer: This diagnosis was made from a biopsy of a L3 bone lesion.  He also had adenopathy.  He is currently on Lupron and in July 2018, was started on Zytiga. Tolerating this well. PSA is improved and is 0.6. Continue current regimen.  Monitor PSA roughly every 3 months.    3.  Cough with signs of pneumonia: Patient will get a chest x-ray at the [facility] later today.  If that looks positive they will put him on an antibiotic.  Otherwise symptomatic management.    4.  Low back pain with some radiculopathy: I would likely refer to orthopedic to see if there is any procedure that they can do that would help his pain    ECOG Performance   ECOG Performance Status: 1     Distress Assessment  Distress Assessment Score: 1:     Pain  Currently in Pain: Yes  Pain Score (Initial OR Reassessment): 3  Location: low back: Chronic issues.  He does continue on MS Contin.  This is managed through the DOC.       Problem List    1. Prostate cancer metastatic to bone (H)     2. Multiple myeloma not having achieved remission (H)  CC OFFICE VISIT LONG   3. Degeneration of lumbar intervertebral disc        ______________________________________________________________________________    History of Present Illness    Diagnosis:   1.  Metastatic prostate cancer: Metastatic disease to  retroperitoneal pelvic lymph nodes, bone lesion.     2.  Solitary plasmacytoma of bone/myeloma: Involving L3.  Minimal bone marrow involvement. .   3.  Thyroid lesion: Likely goiter/thyroid nodule.  PET positive.      Treatment:  1. He was started on Lupron.  First dose November 24, 2017.  Zytiga added in July 2018 based on [facility] study.   2.  Started on treatment with Velcade and dexamethasone on 12/6/2017.  Weekly.    Was off from April until May due to being transferred to group home.  He had radiation to the L3 metastases, I believe SBRT 20 gray ×1 fraction.  3. Observation. He had been followed every 6 months or so by ENT    Interim history:  Is here today to continue on treatment.  He has been getting weekly Velcade with weekly oral dexamethasone.  Has complaints today of coughing, dizziness, fevers and chills.  He says he feels like maybe he has pneumonia.  He does have a chest x-ray scheduled at the [facility] later today.  He also has some milky drainage from his left eye yesterday.  It has not been there today.  His eyes do not feel overly painful.  More low back pain with numbness in his right leg.  He says that when he was in [place] they were going to do a procedure because he has really bad degeneration in his spine and he is not been able to get that approved here.      Pain Status  Currently in Pain: Yes    Review of Systems    Constitutional  Constitutional (WDL): Exceptions to WDL  Fatigue: Concerns(relieved with rest)  Fever: No Concerns  Chills: Concerns  Weight Gain: No Concerns  Weight Loss: No Concerns  Hot flashes/Night Sweats: Concerns(constant)  Neurosensory  Neurosensory (WDL): Exceptions to WDL  Peripheral Motor Neuropathy: Concerns(feet and left hand)  Ataxia: Concerns(right leg goes numb occ)  Peripheral Sensory Neuropathy: Concerns  Confusion: No Concerns  Dizziness: Concerns  Syncope: No Concerns  Eye   Eye Disorder (WDL): Exceptions to WDL  Blurred Vision: No Concerns  Dry Eye: No  Concerns  Eye Pain: No Concerns  Watering Eyes: Concerns(left eye )  Ear  Ear Disorder (WDL): Exceptions to WDL  Ear Pain: No Concerns  Tinnitus: Concerns(since 6th grade due to injury)  Cardiovascular  Cardiovascular (WDL): All cardiovascular elements are within defined limits  Palpitations: No Concerns  Edema: No Concerns  SVT, DVT/PE: No Concerns  Chest Pain - Cardiac: No Concerns  Pulmonary  Respiratory (WDL): Exceptions to WDL  Cough: Concerns(green phlegm)  Dyspnea: Concerns(constant)  Hypoxia: No Concerns  Gastrointestinal  Gastrointestinal (WDL): All gastrointestinal elements are within defined limits  Anorexia: No Concerns  Nausea: No Concerns  Vomiting: No Concerns  Dehydration: No Concerns  Dysgeusia: No Concerns  Dysphagia: No Concerns  Mucositis Oral: No Concerns  Esophagitis: No Concerns  Constipation: No Concerns  Diarrhea: No Concerns  Pharyngitis: No Concerns  Dry Mouth: No Concerns  Genitourinary  Genitourinary (WDL): All genitourinary elements are within defined limits  Urinary Frequency: No Concerns  Urinary Retention: No Concerns  Urinary Tract Pain: No Concerns  Lymphatic  Lymph (WDL): All lymph disorder elements are within defined limits  Lymphedema: No Concerns  Lymph Node Discomfort: No Concerns  Musculoskeletal and Connective Tissue  Musculoskeletal and Connetive Tissue Disorders (WDL): Exceptions to WDL  Arthralgia: Concerns(right ankle and right knee)  Bone Pain: Concerns(left thumb)  Muscle Weakness : Concerns(arms)  Myalgia: Concerns(lower back)  Integumentary  Integumentary (WDL): All integumentary elements are within defined limits  Alopecia: No Concerns  Rash Maculo-Papular: No Concerns  Pruritus: No Concerns  Urticaria: No Concerns  Palmar-Plantar Erythrodysesthesia Syndrome: No Concerns  Flushing: No Concerns  Patient Coping  Patient Coping: Accepting  Accompanied by  Accompanied by: Law Enforcment  Oral Chemo Adherence         Past History  Past Medical History:   Diagnosis Date      Hypertension      Metastatic cancer (H)      Multiple myeloma (H)      Plasmacytoma (H)      Prostate cancer (H)        PHYSICAL EXAM:  /63   Pulse 94   Temp 98.1  F (36.7  C) (Oral)   Wt (!) 229 lb (103.9 kg)   SpO2 94%   BMI 32.86 kg/m    GENERAL: no acute distress. Cooperative in conversation. Here with 2 guards from [facility]  HEENT: pupils are equal, round and reactive. Oromucosa is clean and intact. No ulcerations or mucositis noted. No bleeding noted.  RESP: lungs are clear bilaterally per auscultation. Regular respiratory rate. No wheezes or rhonchi.  CV: Regular, rate and rhythm. No murmurs.  ABD: soft, nontender. Positive bowel sounds. No organomegaly.   MUSCULOSKELETAL: No lower extremity swelling.   NEURO: non focal. Alert and oriented x3.   PSYCH: within normal limits. No depression or anxiety.  SKIN: warm dry intact   LYMPH: no cervical, supraclavicular lymphadenopathy    Lab Results  Lab Standing Order on 05/08/2019   Component Date Value Ref Range Status     TSH 05/08/2019 4.82  0.30 - 5.00 uIU/mL Final   Lab Standing Order on 05/01/2019   Component Date Value Ref Range Status     Sodium 05/01/2019 142  136 - 145 mmol/L Final     Potassium 05/01/2019 3.8  3.5 - 5.0 mmol/L Final     Chloride 05/01/2019 105  98 - 107 mmol/L Final     CO2 05/01/2019 29  22 - 31 mmol/L Final     Anion Gap, Calculation 05/01/2019 8  5 - 18 mmol/L Final     Glucose 05/01/2019 100  70 - 125 mg/dL Final     BUN 05/01/2019 12  8 - 22 mg/dL Final     Creatinine 05/01/2019 0.71  0.70 - 1.30 mg/dL Final     GFR MDRD Af Amer 05/01/2019 >60  >60 mL/min/1.73m2 Final     GFR MDRD Non Af Amer 05/01/2019 >60  >60 mL/min/1.73m2 Final     Bilirubin, Total 05/01/2019 0.8  0.0 - 1.0 mg/dL Final     Calcium 05/01/2019 9.8  8.5 - 10.5 mg/dL Final     Protein, Total 05/01/2019 6.5  6.0 - 8.0 g/dL Final     Albumin 05/01/2019 3.6  3.5 - 5.0 g/dL Final     Alkaline Phosphatase 05/01/2019 45  45 - 120 U/L Final     AST 05/01/2019 15  0  - 40 U/L Final     ALT 05/01/2019 12  0 - 45 U/L Final     Immunoglobulin G 05/01/2019 889  700-1,700 mg/dL Final     Immunoglobulin M 05/01/2019 40* 60 - 280 mg/dL Final     Immunoglobulin A 05/01/2019 421* 65 - 400 mg/dL Final     [place] Free Lt Chain 05/01/2019 1.19  0.33 - 1.94 mg/dL Final     Lambda Free Lt Chain 05/01/2019 0.95  0.57 - 2.63 mg/dL Final     Kappa Lambda Ratio 05/01/2019 1.25  0.26 - 1.65 Final    Performed and/or entered by:  [address] [id number]      Albumin % 05/01/2019 61.5  51.0 - 67.0 % Final     Albumin  05/01/2019 4.0  3.2 - 4.7 g/dL Final     Alpha 1 % 05/01/2019 2.7  2.0 - 4.0 % Final     Alpha 1 05/01/2019 0.2  0.1 - 0.3 g/dL Final     Alpha 2 % 05/01/2019 10.6  5.0 - 13.0 % Final     Alpha 2 05/01/2019 0.7  0.4 - 0.9 g/dL Final     % Beta 05/01/2019 11.9  10.0 - 17.0 % Final     Beta 05/01/2019 0.8  0.7 - 1.2 g/dL Final     Gamma Globulin % 05/01/2019 13.3  9.0 - 20.0 % Final     Gamma Globulin 05/01/2019 0.9  0.6 - 1.4 g/dL Final     ELP Comment 05/01/2019    Final                    Value:Two faint bands are visible in the gamma globulin region of the gel strip, which may represent a monoclonal globulin, but is too faint to quantify. Suggest immunofixation electrophoresis to further characterize and/or repeat electrophoreses at intervals.      Protein, Total 05/01/2019 6.5  6.0 - 8.0 g/dL Final     Path ICD: 05/01/2019 C90.00   Final     Interpreted By: 05/01/2019 Kavin Muñiz MD   Final     Immunofixation Electrophoresis, Se* 05/01/2019 Two monoclonal IgA-kappa immunoglobulins present, one fairly faint.     Final     Path ICD: 05/01/2019 C90.00   Final     Interpreted By: 05/01/2019 Kavin Muñiz MD   Final     WBC 05/01/2019 9.6  4.0 - 11.0 thou/uL Final     RBC 05/01/2019 3.73* 4.40 - 6.20 mill/uL Final     Hemoglobin 05/01/2019 12.3* 14.0 - 18.0 g/dL Final     Hematocrit 05/01/2019 37.5* 40.0 - 54.0 % Final      MCV 05/01/2019 101* 80 - 100 fL Final     MCH 05/01/2019 33.0  27.0 - 34.0 pg Final     MCHC 05/01/2019 32.8  32.0 - 36.0 g/dL Final     RDW 05/01/2019 14.0  11.0 - 14.5 % Final     Platelets 05/01/2019 259  140 - 440 thou/uL Final     MPV 05/01/2019 11.4  8.5 - 12.5 fL Final     Neutrophils % 05/01/2019 76* 50 - 70 % Final     Lymphocytes % 05/01/2019 13* 20 - 40 % Final     Monocytes % 05/01/2019 7  2 - 10 % Final     Eosinophils % 05/01/2019 4  0 - 6 % Final     Basophils % 05/01/2019 0  0 - 2 % Final     Neutrophils Absolute 05/01/2019 7.3  2.0 - 7.7 thou/uL Final     Lymphocytes Absolute 05/01/2019 1.2  0.8 - 4.4 thou/uL Final     Monocytes Absolute 05/01/2019 0.7  0.0 - 0.9 thou/uL Final     Eosinophils Absolute 05/01/2019 0.4  0.0 - 0.4 thou/uL Final     Basophils Absolute 05/01/2019 0.0  0.0 - 0.2 thou/uL Final     TSH 05/01/2019 6.69* 0.30 - 5.00 uIU/mL Final     Free T4 05/01/2019 1.0  0.7 - 1.8 ng/dL Final     Lab Results   Component Value Date    PSA 0.6 04/10/2019    PSA 1.0 12/12/2018    PSA 1.2 11/14/2018    PSA 1.4 10/17/2018    PSA 5.1 (H) 07/18/2018   ]      Imaging    No results found.      Signed by: Aline Roth, CNP

## 2021-05-28 NOTE — PROGRESS NOTES
Pt here for velcade injection which was given SQ R abdomen. Still has some red areas from past injections. Pt denies new complaint. Pt d/c in wheelchair to lobby with DOC guards.

## 2021-05-29 NOTE — PROGRESS NOTES
Pt arrived via wheelchair to clinic for Cycle # 14 Day # 8 of his chemotherapy regimen.  Administered SQ Velcade into LLQ of ABD per MD order.  Pt tolerated procedure well, no s/s of bleeding or swelling at site.  Pt left with guards via wheelchair.

## 2021-05-29 NOTE — PROGRESS NOTES
Pt arrives to infusion center for labs and treatment.  Lab results noted.  Velcade given SQ as ordered.  Pt left clinic stable accompanied by law enforcement.

## 2021-05-29 NOTE — PROGRESS NOTES
Pt is here for Day15, cycle 14 injection of Velcade for Metastatic Prostate Cancer. Pt states he is feeling better after his injections--less nausea and pain at the injection site. Pt here with law enforcement. Pt tolerated shot without problems and left ambulatory with law enforcement.

## 2021-05-29 NOTE — PROGRESS NOTES
Pt came into infusion clinic for his Velcade injection as ordered. Pt tolerated this well. Pt c/o new dysphagia. Pt states they are looking into this at the facility. Pt left infusion clinic via WC accompanied by guards.

## 2021-05-29 NOTE — PROGRESS NOTES
Pt here accompanied by 2 DOC guards  for Velcade injection. Pt got this in his Right lower abdomen . Tolerated injection well. Bandaide applied.  Pt c/o urinary burning for the last week.  Ua sent off per Dr. Carl order. Triage nurse will follow up with half-way if UA is positive. Pt c/o of bilateral testicle pain-pt  Told to f/u with  DOC. Pt left now accompanied by DOC guards.

## 2021-05-30 NOTE — PROGRESS NOTES
Pt arrived via wheelchair to clinic for Cycle # 14 Day # 22 of his chemotherapy regimen.  Labs reviewed, pt ok for treatment.  Administered SQ Velcade into LLQ of ABD per MD order.  Pt tolerated procedure well, no s/s of bleeding or swelling at site.  Pt left with guards via wheelchair.

## 2021-05-30 NOTE — PROGRESS NOTES
Amauri came to clinic via wheelchair accompanied by DOC guards for his next dose of Velcade. Pt assessed. Velcade was given sq into his RLQ abdomen.  He tolerated the injection well and site was covered with a bandaid.  Amauri d/c from clinic via wheelchair accompanied by DOC guards.

## 2021-05-30 NOTE — PROGRESS NOTES
Pt here for injection and labs. States feeling a bit better. Injection given in L abdomen without incident. Pt d/c in wheel chair to lobby with DOC guards.

## 2021-05-30 NOTE — PROGRESS NOTES
PT here in wheelchair with two guards for velcade and eligard inj. PT states appetite good but energy is lower. PT state neuropathy continues in hands and feet. velcade inj given in left lower abdomen with bandaid to site. And eligard prepared and given in right gluteal subcutaneous tissue with bandaid to site. Pt tolerated injections without any problem. PT dc'd in wheelchair with two guards.

## 2021-05-31 NOTE — PROGRESS NOTES
Pt here for velcade injection which was given SQ R abdomen without incident. Pt was recently in hospital and is feeling better but still having some diarrhea. Upon completion pt d/c in wheel chair to lobby with DOC guards.

## 2021-05-31 NOTE — PROGRESS NOTES
Injection given SQ R abdomen without incident. bandaid applied. Pt d/c in wheel chair to lobby with DOC guards.

## 2021-05-31 NOTE — PROGRESS NOTES
"Pt arrives to infusion center for treatment.  Pt was hospitalized on 8/5/19 with sepsis, he reports feeling \"much better\" today.  Dr. Carl approved treatment for today.  Velcade given as ordered without difficulty.  Pt left clinic stable to lobby accompanied by law enforcement.  "

## 2021-05-31 NOTE — PROGRESS NOTES
[facility] Hematology and Oncology Progress Note    Patient: Amauri Shell  MRN: [id number]  Date of Service: 07/31/2019      Assessment and Plan:    1.   Myeloma: He continues on treatment with weekly dexamethasone and weekly Velcade.  Doing okay with the treatment.  He has grade 1 neuropathy which is stable.  Myeloma protein numbers and his serum are stable and at all levels.  No clinical evidence of progressive disease. We will check his myeloma labs every he 3 months.  Will be seen in clinic every 3 months.  Continue acyclovir prophylaxis.  As he has metastatic prostate cancer, which generally carries a worse prognosis than myeloma, we will not refer him for autologous transplant.    2.  Metastatic prostate cancer: On Lupron and Zytiga.  PSA in the serum seems to have now plateaued around 1.  No clinical or biochemical evidence of progression here.      3.  Thyroid nodule:  Previous ultrasound was consistent with diffuse thyroiditis.  We will continue to follow with thyroid studies.    4.  Night sweats: I think these are from his Lupron.  We discussed taking a break or holding this but he would rather not do it that at this time.  I think less likely to be from Abir Adderall.  We will add back prednisone 5 mg twice daily and see if this improves his swelling.  If not after a few months we will probably stop it again.  He has used this in the past.  He is on Effexor 75 mg.  We could also consider increasing this if the prednisone does not work.    ECOG Performance   ECOG Performance Status: 1    Distress Assessment  Distress Assessment Score: 1    Pain  Currently in Pain: No/denies    Diagnosis:    1.  Plasma cell dyscrasia: Diagnostically, it has been somewhat of a challenge.  He does not appear to have any lytic lesions but does appear to have malignant plasma cells and a sclerotic lesion where there is also found metastatic prostate cancer.  His initial bone marrow biopsy report was reviewed by our  "pathologists count 10-15% malignant plasma cells. No \"CRAB\" findings.  He has not clearly fulfill the diagnostic criteria by the international myeloma working group.  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    2.  Metastatic prostate cancer:  Metastatic disease to retroperitoneal and pelvic lymph nodes.  Also L3.  Diagnosed late 2017 from a biopsy of an L3 bone lesion.  PSA at diagnosis was 73.    3.  Thyroid lesion: Likely goiter/thyroid nodule.  PET positive.  He had been followed every 6 months or so by ENT    4.  Significant lumbar stenosis at L5-S1.    Treatment:    1.  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  Days 1, 8, 15 of a 21 day cycle.  He also had radiation to the L3 metastasis, I believe SBRT 20 ken ×1 fraction.    2.  Prostate cancer: Started on Lupron in November 2017. Abiraterone added in July, 2018.    Interim History:    Amauri returns today for follow-up visit.  Continues to have problems with night sweats.  Not much during the day.  No fevers or chills.  He has some chronic low back pain.  No new areas of pain.  No nausea vomiting.  He has peripheral neuropathy in his fingers and toes which is not affecting his function or balance.      Review of Systems:    Constitutional  Constitutional (WDL): Exceptions to WDL  Fatigue: Fatigue not relieved by rest - Limiting instrumental ADL  Neurosensory  Neurosensory (WDL): Exceptions to WDL  Peripheral Sensory Neuropathy: Moderate symptoms, limiting instrumental ADL  Cardiovascular  Cardiovascular (WDL): All cardiovascular elements are within defined limits  Pulmonary  Respiratory (WDL): Exceptions to WDL  Dyspnea: Shortness of breath with moderate exertion  Gastrointestinal  Gastrointestinal (WDL): Exceptions to WDL  Anorexia: Loss of appetite without alteration in eating habits  Nausea: Loss of appetite without alteration in eating habits  Dysgeusia: " Altered taste but no change in diet  Genitourinary  Genitourinary (WDL): All genitourinary elements are within defined limits  Integumentary  Integumentary (WDL): All integumentary elements are within defined limits  Patient Coping  Patient Coping: Accepting  Accompanied by  Accompanied by: Law Enforcment    Past History:    Past Medical History:   Diagnosis Date     Hypertension      Metastatic cancer (H)      Multiple myeloma (H)      Plasmacytoma (H)      Prostate cancer (H)      Physical Exam:    Recent Vitals 7/31/2019   Weight 239 lbs   BSA (m2) 2.31 m2   /81   Pulse 101   Temp 98.7   Temp src 1   SpO2 95   Some recent data might be hidden     General: patient appears stated age of 59 y.o.. Nontoxic and in no distress.   HEENT: Head: atraumatic, normocephalic. Sclerae anicteric.  Chest:  Normal respiratory effort.    Cardiac:  No edema.    Abdomen: abdomen is non-distended  Extremities: normal tone and muscle bulk.  Skin: no lesions or rash. Warm and dry.   CNS: alert and oriented. Grossly non-focal.   Psychiatric: normal mood and affect.     Lab Results:    Recent Results (from the past 240 hour(s))   Comprehensive Metabolic Panel    Collection Time: 07/24/19  8:03 AM   Result Value Ref Range    Sodium 143 136 - 145 mmol/L    Potassium 3.7 3.5 - 5.0 mmol/L    Chloride 106 98 - 107 mmol/L    CO2 30 22 - 31 mmol/L    Anion Gap, Calculation 7 5 - 18 mmol/L    Glucose 98 70 - 125 mg/dL    BUN 8 8 - 22 mg/dL    Creatinine 0.71 0.70 - 1.30 mg/dL    GFR MDRD Af Amer >60 >60 mL/min/1.73m2    GFR MDRD Non Af Amer >60 >60 mL/min/1.73m2    Bilirubin, Total 0.3 0.0 - 1.0 mg/dL    Calcium 9.7 8.5 - 10.5 mg/dL    Protein, Total 6.7 6.0 - 8.0 g/dL    Albumin 3.2 (L) 3.5 - 5.0 g/dL    Alkaline Phosphatase 42 (L) 45 - 120 U/L    AST 12 0 - 40 U/L    ALT 12 0 - 45 U/L   Immunoglobulins, Quantitative    Collection Time: 07/24/19  8:03 AM   Result Value Ref Range    Immunoglobulin G 795 700-1,700 mg/dL    Immunoglobulin M  34 (L) 60 - 280 mg/dL    Immunoglobulin A 416 (H) 65 - 400 mg/dL   Electrophoresis, Protein, Serum    Collection Time: 07/24/19  8:03 AM   Result Value Ref Range    Albumin % 55.8 51.0 - 67.0 %    Albumin  3.3 3.2 - 4.7 g/dL    Alpha 1 % 4.0 2.0 - 4.0 %    Alpha 1 0.2 0.1 - 0.3 g/dL    Alpha 2 % 13.1 (H) 5.0 - 13.0 %    Alpha 2 0.8 0.4 - 0.9 g/dL    % Beta 13.1 10.0 - 17.0 %    Beta 0.8 0.7 - 1.2 g/dL    Gamma Globulin % 14.0 9.0 - 20.0 %    Gamma Globulin 0.8 0.6 - 1.4 g/dL    ELP Comment       Two monoclonal peaks detected in gamma region. See recent immunofixation results.    Protein, Total 5.9 (L) 6.0 - 8.0 g/dL    Monoclonal Peak  g/dL    Path ICD: C90.00     Interpreted By: Bekah Hunt MD    Immunofixation Electrophoresis, Serum    Collection Time: 07/24/19  8:03 AM   Result Value Ref Range    Immunofixation Electrophoresis, Serum       Two monoclonal IgA-kappa immunoglobulin bands present.     Path ICD: C90.00     Interpreted By: Bekah Hunt MD    HM1 (CBC with Diff)    Collection Time: 07/24/19  8:03 AM   Result Value Ref Range    WBC 8.4 4.0 - 11.0 thou/uL    RBC 3.57 (L) 4.40 - 6.20 mill/uL    Hemoglobin 11.4 (L) 14.0 - 18.0 g/dL    Hematocrit 34.6 (L) 40.0 - 54.0 %    MCV 97 80 - 100 fL    MCH 31.9 27.0 - 34.0 pg    MCHC 32.9 32.0 - 36.0 g/dL    RDW 14.0 11.0 - 14.5 %    Platelets 275 140 - 440 thou/uL    MPV 11.0 8.5 - 12.5 fL    Neutrophils % 74 (H) 50 - 70 %    Lymphocytes % 14 (L) 20 - 40 %    Monocytes % 8 2 - 10 %    Eosinophils % 4 0 - 6 %    Basophils % 0 0 - 2 %    Neutrophils Absolute 6.2 2.0 - 7.7 thou/uL    Lymphocytes Absolute 1.2 0.8 - 4.4 thou/uL    Monocytes Absolute 0.7 0.0 - 0.9 thou/uL    Eosinophils Absolute 0.3 0.0 - 0.4 thou/uL    Basophils Absolute 0.0 0.0 - 0.2 thou/uL   Kappa/Lambda Quantitative Free Light Chains and Ratio,Serum - Misc. Lab Test    Collection Time: 07/24/19  8:03 AM   Result Value Ref Range    Miscellanous Test Dept. Chemistry Reference Test      Test Name        Kappa/Lambda Quantitative Free Light Chains and Ratio,Serum      Performing Lab       [address] [id number]      Scan Result See [facility] Miscellaneous Test for results    [facility] Misc Test    Collection Time: 07/24/19  8:03 AM   Result Value Ref Range    [facility] Miscellaneous Test SEE NOTE (!)      Imaging:    No new imaging.      Signed by: Cosmo Carl MD

## 2021-05-31 NOTE — PROGRESS NOTES
Amauri came to clinic via wheelchair accompanied by DOC guards for his next dose of Velcade. Pt assessed. VSS. Velcade was given sq into his RUQ abdomen.  He tolerated the injection well and site was covered with a bandaid.  Amauri d/c from clinic via wheelchair accompanied by DOC guards.

## 2021-05-31 NOTE — PROGRESS NOTES
Pt arrives to infusion center for injection.  Pt was seen by Dr. Carl today and orders were approved.  Velcade given SQ as ordered without difficulty.  Pt left clinic stable accompanied by law enforcement.

## 2021-06-01 VITALS — BODY MASS INDEX: 29.51 KG/M2 | WEIGHT: 205.69 LBS

## 2021-06-01 VITALS — BODY MASS INDEX: 30.64 KG/M2 | WEIGHT: 214 LBS | HEIGHT: 70 IN

## 2021-06-01 VITALS — WEIGHT: 225 LBS | BODY MASS INDEX: 32.28 KG/M2

## 2021-06-01 VITALS — WEIGHT: 223 LBS | BODY MASS INDEX: 32 KG/M2

## 2021-06-01 VITALS — BODY MASS INDEX: 32.26 KG/M2 | WEIGHT: 224.8 LBS

## 2021-06-01 VITALS — WEIGHT: 205.6 LBS

## 2021-06-01 VITALS — WEIGHT: 202 LBS

## 2021-06-01 VITALS — BODY MASS INDEX: 32 KG/M2 | WEIGHT: 223 LBS

## 2021-06-01 VITALS — BODY MASS INDEX: 31.92 KG/M2 | HEIGHT: 70 IN | WEIGHT: 223 LBS

## 2021-06-01 VITALS — WEIGHT: 215 LBS | HEIGHT: 70 IN | BODY MASS INDEX: 30.78 KG/M2

## 2021-06-01 VITALS — BODY MASS INDEX: 29.5 KG/M2 | HEIGHT: 70 IN

## 2021-06-01 NOTE — PROGRESS NOTES
"Pt arrives to [facility] center for treatment accompanied by law enforcement.  Pt was recently discharged from the hospital and states that he is \"feeling better\".  Treatment administered as ordered without difficulty.  Pt left clinic stable to lobby.  Plan RTC as scheduled.  "

## 2021-06-01 NOTE — PROGRESS NOTES
Pt came into infusion clinic for his Velcade injection as ordered. Pt c/o prod cough, shortness of breath. Per Dr. Carl, DOC MD to eval pt on rounds. Also to address pt's increased heartburn. Pt informed of this. Pt tolerated injection well. Pt left infusion clinic via WC accompanied by guards.

## 2021-06-01 NOTE — PROGRESS NOTES
Pt here for injection given in R abdomen. Pt denies new complaint. Pt d/c in wheel chair to lobby with DOC guards.

## 2021-06-02 VITALS — BODY MASS INDEX: 32.43 KG/M2 | WEIGHT: 226 LBS

## 2021-06-02 VITALS — WEIGHT: 229 LBS | BODY MASS INDEX: 32.86 KG/M2

## 2021-06-02 VITALS — BODY MASS INDEX: 33.15 KG/M2 | WEIGHT: 231 LBS

## 2021-06-02 VITALS — BODY MASS INDEX: 32.86 KG/M2 | WEIGHT: 229 LBS

## 2021-06-02 VITALS — BODY MASS INDEX: 32.43 KG/M2 | HEIGHT: 70 IN

## 2021-06-02 VITALS — BODY MASS INDEX: 32.35 KG/M2 | WEIGHT: 225.97 LBS | HEIGHT: 70 IN

## 2021-06-02 VITALS — WEIGHT: 230 LBS | BODY MASS INDEX: 33 KG/M2

## 2021-06-02 VITALS — WEIGHT: 230 LBS | BODY MASS INDEX: 32.93 KG/M2 | HEIGHT: 70 IN

## 2021-06-02 VITALS — BODY MASS INDEX: 33 KG/M2 | WEIGHT: 230 LBS

## 2021-06-02 VITALS — BODY MASS INDEX: 33.43 KG/M2 | WEIGHT: 233 LBS

## 2021-06-02 VITALS — BODY MASS INDEX: 32.87 KG/M2 | WEIGHT: 229.06 LBS

## 2021-06-02 NOTE — TELEPHONE ENCOUNTER
DOC nurse Cheri calls in today to voice concerns about Owen.  Owen coughed up blood today and his sputum.  Patient was in the clinic this morning for infusion however did not mention this to his infusion nurse.  He reports that this has been going on since 5:00 this morning.  This is the only time it has happened and he does have some shortness of breath but that is not new.  Most vitals are stable however his pulse is 115 up from 83 yesterday.  He had a chest x-ray on October 14 and that was normal.  I have scanned the chest x-ray report into his chart.    I discussed with Aline Roth nurse practitioner who reports they should continue to watch and we would refer to pulmonology if symptoms get worse or continue.  Cheri verbalized understanding and will call with any questions.    Mirtha Crawford RN 10/16/19

## 2021-06-02 NOTE — PROGRESS NOTES
Pt came into infusion clinic for his Velcade as ordered. Pt tolerated this well. Pt left infusion clinic via ambulatory and will RTC as sched.

## 2021-06-02 NOTE — PROGRESS NOTES
Pt arrives to [facility] center for treatment.  Pt voices no new concerns today.  Velcade given SQ as ordered without difficulty. Pt left clinic stable to lobby accompanied by law enforcement.

## 2021-06-02 NOTE — PROGRESS NOTES
Pt came into infusion clinic for his Velcade as ordered. Labs reviewed. Pt tolerated injection well. Pt left infusion clinic via WC accompanied by guards.

## 2021-06-02 NOTE — PROGRESS NOTES
[facility] Hematology and Oncology Progress Note    Patient: Amauri Shell  MRN: [id number]  Date of Service: 10/24/19          Reason for Visit    Myeloma  Prostate cancer    Assessment and Plan    1.  Multiple Myeloma.  Did receive radiation to this bone lesion.  He also started on systemic therapy.  Our Pathologist did review his case and they do feel that he has myeloma.  We will continue his weekly Velcade, dexamethasone.  He will continue 10 mg of dexamethasone weekly. We will monitor his myeloma labs as well every 3 months.  They have been stable.  Pending from today    2.  Metastatic prostate cancer: This diagnosis was made from a biopsy of a L3 bone lesion.  He also had adenopathy.  He is currently on Lupron and in July 2018, was started on Zytiga. Tolerating this well.  PSA has started to come up.  I will discuss with Dr. Carl if we should switch from Zytiga to Xtandi.    3.  Cough with signs of pneumonia: She has had recurrent pneumonia over the last couple of months.  He says last week he actually finally started feeling a little bit better.  His coughing is less.  No fevers or shaking.  We will continue to monitor the symptoms.  I did tell him that if he continues to have recurrent infections we may have to think about holding treatment    4.  Goals of care discussion: Patient had his advanced healthcare directive done.  He states that he wants to be DNR, DNI.  He states he feels very comfortable with this decision.  He says he has an incurable cancer and if the time happens he definitely does not want to be resuscitated.  He seems clear in this decision.  He has talked about it with his family and the people that would speak for him which is a brother and her friends at this time that will be scanned into the system.    ADDENDUM: pt's PSA came back up to 5, from 1. After discussion with Dr. Carl, we would like to stop Zytiga and prednisone and start Xtandi 160mg po daily. Updated Dr. Nogueira from  DOC. He will update pt and order medication.     ECOG Performance   ECOG Performance Status: 2     Distress Assessment  Distress Assessment Score: No distress:     Pain  Currently in Pain: Yes  Pain Score (Initial OR Reassessment): 6  Location: lower back and hips: Chronic issues.  He does continue on MS Contin.  This is managed through the DOC.       Problem List    1. Prostate cancer metastatic to multiple sites (H)     2. Multiple myeloma not having achieved remission (H)        ______________________________________________________________________________    History of Present Illness    Diagnosis:   1.  Metastatic prostate cancer: Metastatic disease to retroperitoneal pelvic lymph nodes, bone lesion.     2.  Solitary plasmacytoma of bone/myeloma: Involving L3.  Minimal bone marrow involvement. .   3.  Thyroid lesion: Likely goiter/thyroid nodule.  PET positive.      Treatment:  1. He was started on Lupron.  First dose November 24, 2017.  Zytiga added in July 2018 based on [facility] study.   2.  Started on treatment with Velcade and dexamethasone on 12/6/2017.  Weekly.    Was off from April until May due to being transferred to correction.  He had radiation to the L3 metastases, I believe SBRT 20 gray ×1 fraction.  3. Observation. He had been followed every 6 months or so by ENT    Interim history:  Is here today to continue on treatment.  He has been getting weekly Velcade with weekly oral dexamethasone.  He states that he had a hard August in September because of recurrent pneumonia.  He says last week he was coughing any kind of heard something pop and then his lungs felt like they expanded and since then he is been feeling much better.  No fevers.  Less coughing.  He is able to do his incentive spirometry better.  No new bone or back pain or worsening bone or back pain.      Pain Status  Currently in Pain: Yes    Review of Systems    Constitutional  Constitutional (WDL): Exceptions to WDL  Fatigue:  Concerns(improving slowly)  Hot flashes/Night Sweats: Concerns  Neurosensory  Neurosensory (WDL): Exceptions to WDL  Peripheral Motor Neuropathy: Concerns(fingers)  Peripheral Sensory Neuropathy: Concerns(fingers)  Dizziness: Concerns(with position changes )  Eye   Eye Disorder (WDL): All eye disorder elements are within defined limits  Ear  Ear Disorder (WDL): Exceptions to WDL  Tinnitus: Concerns(right ear)  Cardiovascular  Cardiovascular (WDL): All cardiovascular elements are within defined limits  Pulmonary  Respiratory (WDL): Exceptions to WDL  Dyspnea: Concerns(with activity)  Gastrointestinal  Gastrointestinal (WDL): All gastrointestinal elements are within defined limits  Genitourinary  Genitourinary (WDL): All genitourinary elements are within defined limits  Lymphatic  Lymph (WDL): All lymph disorder elements are within defined limits  Musculoskeletal and Connective Tissue  Musculoskeletal and Connetive Tissue Disorders (WDL): Exceptions to WDL  Arthralgia: Concerns  Integumentary  Integumentary (WDL): Exceptions to WDL(bruising easily)  Patient Coping  Patient Coping: Accepting;Open/discussion  Accompanied by  Accompanied by: Law Enforcment  Oral Chemo Adherence         Past History  Past Medical History:   Diagnosis Date     Hypertension      Metastatic cancer (H)      Multiple myeloma (H)      Plasmacytoma (H)      Prostate cancer (H)        PHYSICAL EXAM:  BP (!) 142/95   Pulse 82   Temp 98.1  F (36.7  C) (Oral)   Ht 6' (1.829 m)   Wt (!) 239 lb (108.4 kg)   SpO2 93%   BMI 32.41 kg/m    GENERAL: no acute distress. Cooperative in conversation. Here with 2 guards from DOC  HEENT: pupils are equal, round and reactive. Oromucosa is clean and intact. No ulcerations or mucositis noted. No bleeding noted.  RESP: lungs are clear bilaterally per auscultation. Regular respiratory rate. No wheezes or rhonchi.  CV: Regular, rate and rhythm. No murmurs.  ABD: soft, nontender. Positive bowel sounds. No  organomegaly.   MUSCULOSKELETAL: No lower extremity swelling.   NEURO: non focal. Alert and oriented x3.   PSYCH: within normal limits. No depression or anxiety.  SKIN: warm dry intact   LYMPH: no cervical, supraclavicular lymphadenopathy    Lab Results  Lab Standing Order on 10/23/2019   Component Date Value Ref Range Status     Sodium 10/23/2019 145  136 - 145 mmol/L Final     Potassium 10/23/2019 3.9  3.5 - 5.0 mmol/L Final     Chloride 10/23/2019 106  98 - 107 mmol/L Final     CO2 10/23/2019 31  22 - 31 mmol/L Final     Anion Gap, Calculation 10/23/2019 8  5 - 18 mmol/L Final     Glucose 10/23/2019 93  70 - 125 mg/dL Final     BUN 10/23/2019 12  8 - 22 mg/dL Final     Creatinine 10/23/2019 0.81  0.70 - 1.30 mg/dL Final     GFR MDRD Af Amer 10/23/2019 >60  >60 mL/min/1.73m2 Final     GFR MDRD Non Af Amer 10/23/2019 >60  >60 mL/min/1.73m2 Final     Bilirubin, Total 10/23/2019 0.4  0.0 - 1.0 mg/dL Final     Calcium 10/23/2019 9.7  8.5 - 10.5 mg/dL Final     Protein, Total 10/23/2019 7.1  6.0 - 8.0 g/dL Final     Albumin 10/23/2019 3.7  3.5 - 5.0 g/dL Final     Alkaline Phosphatase 10/23/2019 55  45 - 120 U/L Final     AST 10/23/2019 11  0 - 40 U/L Final     ALT 10/23/2019 13  0 - 45 U/L Final     Immunoglobulin G 10/23/2019 867  700-1,700 mg/dL Final     Immunoglobulin M 10/23/2019 41* 60 - 280 mg/dL Final     Immunoglobulin A 10/23/2019 534* 65 - 400 mg/dL Final     WBC 10/23/2019 10.8  4.0 - 11.0 thou/uL Final     RBC 10/23/2019 3.93* 4.40 - 6.20 mill/uL Final     Hemoglobin 10/23/2019 12.4* 14.0 - 18.0 g/dL Final     Hematocrit 10/23/2019 40.0  40.0 - 54.0 % Final     MCV 10/23/2019 102* 80 - 100 fL Final     MCH 10/23/2019 31.6  27.0 - 34.0 pg Final     MCHC 10/23/2019 31.0* 32.0 - 36.0 g/dL Final     RDW 10/23/2019 14.9* 11.0 - 14.5 % Final     Platelets 10/23/2019 248  140 - 440 thou/uL Final     MPV 10/23/2019 11.1  8.5 - 12.5 fL Final     Neutrophils % 10/23/2019 80* 50 - 70 % Final     Lymphocytes %  10/23/2019 11* 20 - 40 % Final     Monocytes % 10/23/2019 5  2 - 10 % Final     Eosinophils % 10/23/2019 2  0 - 6 % Final     Basophils % 10/23/2019 0  0 - 2 % Final     Neutrophils Absolute 10/23/2019 8.7* 2.0 - 7.7 thou/uL Final     Lymphocytes Absolute 10/23/2019 1.2  0.8 - 4.4 thou/uL Final     Monocytes Absolute 10/23/2019 0.6  0.0 - 0.9 thou/uL Final     Eosinophils Absolute 10/23/2019 0.3  0.0 - 0.4 thou/uL Final     Basophils Absolute 10/23/2019 0.0  0.0 - 0.2 thou/uL Final     PSA 10/23/2019 5.0* 0.0 - 4.5 ng/mL Final     Lab Results   Component Value Date    PSA 5.0 (H) 10/23/2019    PSA 1.0 07/03/2019    PSA 0.6 04/10/2019    PSA 1.0 12/12/2018    PSA 1.2 11/14/2018   ]      Imaging    Xr Chest 1 View Portable    Result Date: 9/25/2019  EXAM: XR CHEST 1 VIEW PORTABLE LOCATION: [facility] DATE/TIME: 9/25/2019 6:39 PM INDICATION: Chest Pain COMPARISON: 08/16/2019     Multisegment consolidation in the right lower lung and subsegmental consolidation in the left lower lung, suspicious for pneumonia. No pneumothorax. No pleural effusion. Upper normal heart size.    Xr Abdomen Ap Portable    Result Date: 9/25/2019  EXAM: XR ABDOMEN AP PORTABLE LOCATION: [facility] DATE/TIME: 9/25/2019 11:30 PM INDICATION: abdominal pain, distention. COMPARISON: None.     Bowel gas pattern is normal. Nothing for obstruction or free air. No evidence for renal stones. Excreted contrast material within the bladder. Pulmonary infiltrates both lower lobes.    Xr Swallow Study W Speech    Result Date: 9/26/2019  EXAM: XR SWALLOW STUDY W SPEECH LOCATION: [facility] DATE/TIME: 9/26/2019 1:28 PM INDICATION: Difficulty swallowing. COMPARISON: None. TECHNIQUE: Routine. FINDINGS: FLUOROSCOPIC TIME: 1.1 minutes NUMBER OF IMAGES: 8 Swallow study with Speech Pathology using multiple barium thicknesses. Puree was swallowed normally. Honey and nectar consistency swallowed normally other than minimal stasis. Thin liquid  demonstrated one episode of pour over and epiglottic undercoating.     Cta Chest Pe Run    Result Date: 9/25/2019  EXAM: CTA CHEST PE RUN LOCATION: [facility] DATE/TIME: 9/25/2019 8:57 PM INDICATION: Shortness of breath, Dyspnea. History of myeloma and prostate cancer. COMPARISON: None. TECHNIQUE: Helical acquisition through the chest was performed during the arterial phase of contrast enhancement using IV contrast. 2D and 3D reconstructions were performed by the CT technologist. Dose reduction techniques were used. IV CONTRAST: Iohexol (Omni) 80 mL FINDINGS: ANGIOGRAM CHEST: Suboptimal timing of the contrast bolus as the aorta is enhanced greater than the pulmonary arteries. The peripheral vessels to the right upper, middle, and lower lobes are attenuated by extensive infiltrates. There are possible tiny emboli within the distal right main pulmonary artery on image 286 of series 4 and within a segmental vessel to the right lower lobe on image 263. No evidence for emboli to the left lung. No evidence for aortic dissection. RV/LV RATIO: Greater than 1 suggesting right heart strain. LUNGS AND PLEURA: Extensive nodular airspace infiltrates throughout the right upper, middle, and lower lobes. Considerably less reticulonodular infiltrates within the lingula and left lower lobe. No effusions. MEDIASTINUM: Enlarged right hilar lymph node measuring 3.0 x 1.8 cm borderline-enlarged subcarinal node, otherwise no mediastinal adenopathy. Small esophageal hiatal hernia. LIMITED UPPER ABDOMEN: Negative. MUSCULOSKELETAL: Negative.     CONCLUSION: 1.  Evaluation for pulmonary emboli compromised by the phase of the contrast bolus and extensive right lung infiltrates. There are questionable tiny emboli within the distal right main pulmonary artery and segmental branch vessel to the right lower lobe. 2.  Extensive infiltrates throughout the right lung and with lesser infiltrates within the lingula and left lower lobe  concerning for pneumonia. Neoplastic process considered less likely, but not entirely excluded. Follow-up CT chest exam recommended after therapy. 3.  Enlarged right hilar lymph node. 4.  Results discussed with Dr. Jonh Dodge on 09/25/2019 at 9:15 PM.    Us Venous Legs Bilateral    Result Date: 9/26/2019  EXAM: US VENOUS LEGS BILATERAL LOCATION: [facility] DATE/TIME: 9/26/2019 12:24 PM INDICATION: Bedrest/SOB COMPARISON: None. TECHNIQUE: Routine exam with compression, augmentation, and duplex utilizing 2D gray-scale imaging, Doppler interrogation with color-flow and spectral waveform analysis. FINDINGS: The common femoral, femoral, popliteal, and segmentally visualized calf veins were evaluated. Right leg veins are negative for deep venous thrombosis. Left leg veins are negative for deep venous thrombosis. No popliteal cysts.     CONCLUSION: 1.  Bilateral leg veins are negative for DVT.        Signed by: Aline Rtoh, CNP

## 2021-06-02 NOTE — PROGRESS NOTES
Pt here for injection. Pt states coughing up green sputum but no fevers. Injection given in R abdomen without incident. bandaid applied and pt d/c in wheel chair to lobby with DOC guards.

## 2021-06-03 VITALS
TEMPERATURE: 98.1 F | SYSTOLIC BLOOD PRESSURE: 142 MMHG | HEART RATE: 82 BPM | BODY MASS INDEX: 32.37 KG/M2 | WEIGHT: 239 LBS | OXYGEN SATURATION: 93 % | DIASTOLIC BLOOD PRESSURE: 95 MMHG | HEIGHT: 72 IN

## 2021-06-03 VITALS
HEART RATE: 81 BPM | WEIGHT: 238.98 LBS | TEMPERATURE: 98.1 F | DIASTOLIC BLOOD PRESSURE: 71 MMHG | OXYGEN SATURATION: 92 % | SYSTOLIC BLOOD PRESSURE: 127 MMHG | BODY MASS INDEX: 32.41 KG/M2

## 2021-06-03 VITALS
SYSTOLIC BLOOD PRESSURE: 124 MMHG | DIASTOLIC BLOOD PRESSURE: 83 MMHG | BODY MASS INDEX: 32.64 KG/M2 | HEIGHT: 72 IN | OXYGEN SATURATION: 93 % | WEIGHT: 241 LBS | HEART RATE: 75 BPM | TEMPERATURE: 97.9 F

## 2021-06-03 VITALS
WEIGHT: 241.2 LBS | TEMPERATURE: 98.1 F | BODY MASS INDEX: 32.67 KG/M2 | DIASTOLIC BLOOD PRESSURE: 91 MMHG | OXYGEN SATURATION: 92 % | HEIGHT: 72 IN | HEART RATE: 80 BPM | SYSTOLIC BLOOD PRESSURE: 150 MMHG

## 2021-06-03 VITALS
WEIGHT: 235 LBS | HEART RATE: 102 BPM | BODY MASS INDEX: 31.87 KG/M2 | TEMPERATURE: 97.6 F | SYSTOLIC BLOOD PRESSURE: 108 MMHG | OXYGEN SATURATION: 95 % | DIASTOLIC BLOOD PRESSURE: 61 MMHG

## 2021-06-03 VITALS — BODY MASS INDEX: 34.01 KG/M2 | WEIGHT: 237 LBS

## 2021-06-03 VITALS — BODY MASS INDEX: 34.29 KG/M2 | WEIGHT: 239 LBS

## 2021-06-03 NOTE — PROGRESS NOTES
Pulmonary Clinic Visit    Cc: recurrent pneumonias    HPI: 59 y.o. male with history of multiple myeloma and metastatic prostate cancer who presents from [facility].    He has had pneumonia multiple times in 2019. Feb, April, June August x2, September x1, November 15.  Hospitalized most recently 9/2019 for severe RLL pneumonia, septic shock on pressors and Bipap. Also question of PE (contrast was not timed right), so started on anticoagulation.    He was then improved and discharged but continued to have complaints of cough with green sputum and had 2 xrays to evaluation this 10/14 and 10/16 both read as clear.    Then started Coughing up green phlegm, shortness of breath. CXR 11/15 found RLL infiltrate that wasn't there 10/2019 (I can't see the images as they were at the [facility])    Levaquin just completed 3-4 days ago.  Coughing has improved, doesn't feel like he is coughing up the bad stuff any longer. Did have hemoptysis at one time but this has now resolved, he thinks no hemoptysis for a month.     +Night sweats which have gotten better but last 4-6 hours, got so bad he required IV fluid for dehydration.     Recently changed from Zytiga to Xtandi for the prostate cancer.      1200 after hospital, now just shy of 3000 (prior to all of this was 4000)          Past Medical History:   Diagnosis Date     GERD (gastroesophageal reflux disease)      Hiatal hernia      Hypertension      Metastatic cancer (H)      Multiple myeloma (H)      Plasmacytoma (H)      Prostate cancer (H)          Social History     Tobacco Use     Smoking status: Former Smoker     Smokeless tobacco: Never Used   Substance Use Topics     Alcohol use: Yes     Comment: 1 x month         Family History   Problem Relation Age of Onset     Cancer Mother      Cancer Sister          Current Outpatient Medications   Medication Sig Note     acyclovir (ZOVIRAX) 400 MG tablet Take 400 mg by mouth 2 (two) times a day.      amLODIPine (NORVASC) 5 MG tablet  Take 5 mg by mouth daily.      apixaban (ELIQUIS) 5 mg Tab tablet Take 2 tablets (10 mg total) by mouth 2 (two) times a day for 7 days, THEN 1 tablet (5 mg total) 2 (two) times a day.      atorvastatin (LIPITOR) 20 MG tablet Take 20 mg by mouth daily.             calcium carb/magnesium hydrox (MYLANTA ORAL) Take 30 mL by mouth 2 (two) times a day.      calcium polycarbophil (FIBERCON) 625 mg tablet Take 625 mg by mouth 2 (two) times a day.             capsaicin (ZOSTRIX) 0.025 % cream Apply 1 application topically 2 (two) times a day. 9/25/2019: Scheduled per MAR, however no administrations recorded -- self-administered med?     chlorhexidine (PERIDEX) 0.12 % solution Apply 10 mL to the mouth or throat 2 (two) times a day.             dexAMETHasone (DECADRON) 2 MG tablet Take 10 mg by mouth once a week Wednesdays (day of chemo).      docusate sodium (COLACE) 100 MG capsule Take 100 mg by mouth 2 (two) times a day.      enzalutamide (XTANDI) 40 mg cap Take 160 mg by mouth daily.      fluticasone propionate (FLONASE) 50 mcg/actuation nasal spray Apply 2 sprays into each nostril daily.      lanolin/mineral oil/petrolatum (ARTIFICIAL TEARS OPHT) Apply 1 drop to eye every 2 (two) hours as needed.      levothyroxine (SYNTHROID, LEVOTHROID) 100 MCG tablet Take 100 mcg by mouth daily.      lisinopril (PRINIVIL,ZESTRIL) 40 MG tablet Take 40 mg by mouth daily.      loratadine (CLARITIN) 10 mg tablet Take 10 mg by mouth daily.      LORazepam (ATIVAN) 0.5 MG tablet Take 0.5 mg by mouth 3 (three) times a day as needed for anxiety.      mag hydrox/aluminum hyd/simeth (MAALOX ORAL) Take 30 mL by mouth 2 (two) times a day as needed (heartburn).      metoclopramide (REGLAN) 5 MG tablet Take 5 mg by mouth 2 (two) times a day.             min oil-petrolat (AQUAPHOR) ointment Apply 1 application topically 2 (two) times a day. 9/25/2019: Scheduled per MAR; however, no administrations recorded since Sept 3     morphine (MS CONTIN) 30 MG  12 hr tablet Take 30 mg by mouth every 12 (twelve) hours.      multivitamin therapeutic tablet Take 1 tablet by mouth daily.      naproxen (NAPROSYN) 500 MG tablet Take 500 mg by mouth 2 (two) times a day with meals.      omeprazole (PRILOSEC) 20 MG capsule Take 20 mg by mouth daily before breakfast.      ondansetron (ZOFRAN) 8 MG tablet Take 8 mg by mouth every 8 (eight) hours as needed for nausea.             oxyCODONE (ROXICODONE) 5 MG immediate release tablet Take 5 mg by mouth 2 (two) times a day as needed for pain.      oxymetazoline HCl (NASAL SPRAY SINUS NASL) 2 sprays into each nostril 4 (four) times a day as needed.      polyethylene glycol 3350 (MIRALAX ORAL) Take 17 g by mouth daily.      polyvinyl alcohol (LIQUIFILM TEARS) 1.4 % ophthalmic solution Administer 1 drop to both eyes every 2 (two) hours as needed for dry eyes.      saliva stimulant (BIOTENE ORAL BALANCE) liquid Take 10 mL by mouth 4 (four) times a day as needed.             sodium chloride (OCEAN) 0.65 % nasal spray Apply 2 sprays into each nostril 4 (four) times a day as needed for congestion.      sucralfate (CARAFATE) 100 mg/mL suspension Take 1 g by mouth 4 (four) times a day.      traZODone (DESYREL) 150 MG tablet Take 150 mg by mouth at bedtime.      venlafaxine (EFFEXOR-XR) 75 MG 24 hr capsule Take 75 mg by mouth daily.             abiraterone (ZYTIGA) 250 mg Tab Take 1,000 mg by mouth daily.             cyanocobalamin 1,000 mcg/mL injection Inject 1,000 mcg into the shoulder, thigh, or buttocks every 28 days. 4th Monday of each month            hydrOXYzine pamoate (VISTARIL) 25 MG capsule Take 1-2 capsules (25-50 mg total) by mouth every 6 (six) hours as needed for anxiety.        Allergies   Allergen Reactions     Coconut Hives     Other Environmental Allergy      Laundry detergent, soap     Adhesive Tape-Silicones Rash         Review of Systems   Constitutional: Positive for diaphoresis.   HENT: Negative.    Eyes: Negative.     Respiratory: Positive for cough and shortness of breath. Negative for chest tightness and wheezing.    Cardiovascular: Negative.    Gastrointestinal: Negative.    Endocrine: Negative.    Genitourinary: Negative.    Musculoskeletal: Positive for arthralgias and back pain.   Allergic/Immunologic: Negative.    Neurological: Negative.    Hematological: Negative.    Psychiatric/Behavioral: Negative.        Vitals:    11/19/19 0759   BP: 138/88   Pulse: 80   Resp: 24   SpO2: 91%   RA  Physical Exam   Constitutional: He is oriented to person, place, and time. He appears well-developed and well-nourished.   HENT:   Head: Normocephalic and atraumatic.   Forehead excision site with 2 sutures healing well. Mallampati II   Eyes: Conjunctivae and EOM are normal.   Neck: Normal range of motion. No tracheal deviation present.   Cardiovascular: Normal rate and regular rhythm.   Pulmonary/Chest: Effort normal and breath sounds normal.   Clear at both bases   Abdominal: Soft. There is no abdominal tenderness.   Musculoskeletal: Normal range of motion.         General: No edema.      Comments: No clubbing   Lymphadenopathy:     He has no cervical adenopathy.   Neurological: He is alert and oriented to person, place, and time.   Skin: Skin is warm and dry.   Psychiatric: He has a normal mood and affect. His behavior is normal. Thought content normal.     CTA chest 9/2019: unable to view because NIL not opening image.  EXAM: CTA CHEST PE RUN  LOCATION: [facility]  DATE/TIME: 9/25/2019 8:57 PM     INDICATION: Shortness of breath, Dyspnea. History of myeloma and prostate cancer.  COMPARISON: None.  TECHNIQUE: Helical acquisition through the chest was performed during the arterial phase of contrast enhancement using IV contrast. 2D and 3D reconstructions were performed by the CT technologist. Dose reduction techniques were used.   IV CONTRAST: Iohexol (Omni) 80 mL     FINDINGS:  ANGIOGRAM CHEST: Suboptimal timing of the contrast  bolus as the aorta is enhanced greater than the pulmonary arteries. The peripheral vessels to the right upper, middle, and lower lobes are attenuated by extensive infiltrates. There are possible tiny   emboli within the distal right main pulmonary artery on image 286 of series 4 and within a segmental vessel to the right lower lobe on image 263. No evidence for emboli to the left lung. No evidence for aortic dissection.      RV/LV RATIO: Greater than 1 suggesting right heart strain.     LUNGS AND PLEURA: Extensive nodular airspace infiltrates throughout the right upper, middle, and lower lobes. Considerably less reticulonodular infiltrates within the lingula and left lower lobe. No effusions.     MEDIASTINUM: Enlarged right hilar lymph node measuring 3.0 x 1.8 cm borderline-enlarged subcarinal node, otherwise no mediastinal adenopathy. Small esophageal hiatal hernia.     LIMITED UPPER ABDOMEN: Negative.     MUSCULOSKELETAL: Negative.     IMPRESSION:   CONCLUSION:  1.  Evaluation for pulmonary emboli compromised by the phase of the contrast bolus and extensive right lung infiltrates. There are questionable tiny emboli within the distal right main pulmonary artery and segmental branch vessel to the right lower lobe.  2.  Extensive infiltrates throughout the right lung and with lesser infiltrates within the lingula and left lower lobe concerning for pneumonia. Neoplastic process considered less likely, but not entirely excluded. Follow-up CT chest exam recommended   after therapy.  3.  Enlarged right hilar lymph node.  4.  Results discussed with Dr. Jonh Dodge on 09/25/2019 at 9:15 PM.    CXR: 10/14: unable to view as it was done through [facility] but read as no acute chest process  CXR 11/15: unable to view but read as newly developing RLL infiltrate.    Assessment/Plan  59yoM with recurrent pneumonias of unclear origin. Patient does have reflux that is pretty severe related to a hiatal hernia. He is already  sleeping upright. I have increased his PPI to two times a day for a trial period.    I also wonder if the Xtandi and previous Zytiga are playing a role here given the relatively high percentage of patients who get respiratory infections.    We have not captured what this is--all micro has been negative. Therefore at some point a bronchoscopy may be helpful but only in the setting of an active process. To that end, I have ordered CBC and procalcitonin today to figure out what this is. If no evidence of active infection, bronch will not help us. At the time his symptoms recur, I would like to then scan him and arrange for bronchoscopy. CT chest would also help see the severity of the infection and his lung architecture.    He should continue his Eliquis for PE.    I would like to see him back in 2 months to discuss findings with him.     Marcela Mari MD  Electronically signed on 11/19/2019 8:54 AM

## 2021-06-03 NOTE — PROGRESS NOTES
Pt came into infusion clinic for his Velcade as ordered. Pt feeling ok today. Reports he was recently started on an Antibx at the [facility]. Pt also reports new bump on forehead. Pt has made the nurses at the [facility] aware and plans to tell the MD. Pt tolerated injection well. Pt left infusion clinic via WC accompanied by guards.

## 2021-06-03 NOTE — PROGRESS NOTES
Pt came into infusion clinic for his Velcade as ordered. Pt tolerated injection well. Pt states he had an ultrasound done on his forehead at the [facility] yesterday but is unaware of the results. Pt left infusion clinic via WC accompanied by guards.

## 2021-06-03 NOTE — PROGRESS NOTES
"Amauri arrived A&Ox4, via w/c, accompanied by 2 DOC guards. Pt confirms he is here for velcade injection. Pt states he is still fighting infection; dislodged \"a very large gob from my sinues this morning\", pt described this a about 1in square, white, fibrous and a little blood tinged. He feels he is having difficulty swallowing, eating very little, sores on roof of mouth he feels from dry air and breathing with mouth open due to sinus congestion; very ache sore wrists hands and elbows which Amauri states is new for him; he has some dizziness especially with standing; getting more forgetful; watery eyes; nausea with vomiting- 3 episodes yesterday and 2 episodes Monday.  Will include his sx in report back to [facility].  Labs drawn; results noted. VS noted  velcade inj R lower abd SQ with some burning with slow injection; site covered w bandaid, no bleeding from site.  Amauri dc'd A&Ox4 via w/c, accompanied by 2DOC guards; gray envelope given to guards.  "

## 2021-06-03 NOTE — PROGRESS NOTES
Pt came into infusion clinic for his Velcade injection as ordered. Pt tolerated this well. Pt left infusion via WC accompanied by guards.

## 2021-06-03 NOTE — PROGRESS NOTES
Patient is here for labs, provider and treatment for Prostate cancer metastatic to multiple sites.

## 2021-06-03 NOTE — PROGRESS NOTES
Amauri came to clinic via wheelchair accompanied by [facility] guards for his next dose of Velcade. Pt assessed. VSS. Velcade was given sq into his LLQ abdomen. He tolerated the injection well and site was covered with a bandaid. Amauri d/c from clinic via wheelchair accompanied by [facility] guards.

## 2021-06-04 VITALS
BODY MASS INDEX: 31.97 KG/M2 | OXYGEN SATURATION: 94 % | WEIGHT: 236 LBS | TEMPERATURE: 98 F | SYSTOLIC BLOOD PRESSURE: 120 MMHG | HEART RATE: 75 BPM | DIASTOLIC BLOOD PRESSURE: 75 MMHG | HEIGHT: 72 IN

## 2021-06-04 VITALS
SYSTOLIC BLOOD PRESSURE: 116 MMHG | OXYGEN SATURATION: 93 % | HEIGHT: 72 IN | HEART RATE: 79 BPM | BODY MASS INDEX: 32.23 KG/M2 | TEMPERATURE: 97.8 F | DIASTOLIC BLOOD PRESSURE: 75 MMHG | WEIGHT: 238 LBS

## 2021-06-04 VITALS
WEIGHT: 234 LBS | SYSTOLIC BLOOD PRESSURE: 135 MMHG | HEART RATE: 79 BPM | TEMPERATURE: 98.4 F | OXYGEN SATURATION: 92 % | DIASTOLIC BLOOD PRESSURE: 98 MMHG | BODY MASS INDEX: 31.74 KG/M2

## 2021-06-04 NOTE — PROGRESS NOTES
"Amauri reports his ankles, knees, hands, wrists are painful -knees and ankles are the worst; shakes/tremors, still sinus congestion- has blown in total two large \"chunks\" out of his nose, even \"felt it tear when it came out, and my nose bled for days. Ever since that my right ear feels like something is crawling around in there\". This has all been since starting on oral chemo. Lots of nausea. He was told the tissue was sent for pathology at [facility] but he has not received any results.  Amauri will try naproxen and discuss the symptoms at his next provider visit.  Amauri was given velcade inj to L lower abd SQ w/o incident, site covered w isaura.  Amauri dc'd A&Ox4 via w/c escorted by [facility] guard x2.   "

## 2021-06-04 NOTE — PROGRESS NOTES
Pt here for injection which was given R abdomen without incident. Pt denies new complaint. Pt d/c in wheel chair to lobby with [facility] guards

## 2021-06-04 NOTE — TELEPHONE ENCOUNTER
----- Message from Aline Roth CNP sent at 12/4/2019 10:17 AM CST -----  Please call nurse at [facility] with psa results

## 2021-06-04 NOTE — PATIENT INSTRUCTIONS - HE
"No labs were due today.   Injection given as ordered; no adverse reactions.  Ankles, knees, hands, wrists are painful -knees and ankles are the worse; shakes/tremors, still sinus congestion- has blown in total two large \"chunks\" out of his nose, even \"felt it tear when it came out, and my nose bled for days. Ever since that my right ear feel like something is crawling around in there\". This has all been since starting on oral chemo. Lots of nausea. He was told the tissue was sent for pathology at [facility] but he has not received any results.  Amauri will try naproxen and discuss the symptoms at his next provider visit.    Bortezomib injection  Brand Name: Velcade  What is this medicine?  BORTEZOMIB (bor VANCE oh mib) is a medicine that targets proteins in cancer cells and stops the cancer cells from growing. It is used to treat multiple myeloma and mantle-cell lymphoma.  How should I use this medicine?  This medicine is for injection into a vein or for injection under the skin. It is given by a health care professional in a hospital or clinic setting.  Talk to your pediatrician regarding the use of this medicine in children. Special care may be needed.  What side effects may I notice from receiving this medicine?  Side effects that you should report to your doctor or health care professional as soon as possible:    allergic reactions like skin rash, itching or hives, swelling of the face, lips, or tongue    breathing problems    changes in hearing    changes in vision    fast, irregular heartbeat    feeling faint or lightheaded, falls    pain, tingling, numbness in the hands or feet    right upper belly pain    seizures    swelling of the ankles, feet, hands    unusual bleeding or bruising    unusually weak or tired    vomiting    yellowing of the eyes or skin  Side effects that usually do not require medical attention (report to your doctor or health care professional if they continue or are bothersome):    changes in " emotions or moods    constipation    diarrhea    loss of appetite    headache    irritation at site where injected    nausea  What may interact with this medicine?  This medicine may interact with the following medications:    ketoconazole    rifampin    ritonavir    [place]'s Wort  What if I miss a dose?  It is important not to miss your dose. Call your doctor or health care professional if you are unable to keep an appointment.  Where should I keep my medicine?  This drug is given in a hospital or clinic and will not be stored at home.  What should I tell my health care provider before I take this medicine?  They need to know if you have any of these conditions:    diabetes    heart disease    irregular heartbeat    liver disease    on hemodialysis    low blood counts, like low white blood cells, platelets, or hemoglobin    peripheral neuropathy    taking medicine for blood pressure    an unusual or allergic reaction to bortezomib, mannitol, boron, other medicines, foods, dyes, or preservatives    pregnant or trying to get pregnant    breast-feeding  What should I watch for while using this medicine?  You may get drowsy or dizzy. Do not drive, use machinery, or do anything that needs mental alertness until you know how this medicine affects you. Do not stand or sit up quickly, especially if you are an older patient. This reduces the risk of dizzy or fainting spells.  In some cases, you may be given additional medicines to help with side effects. Follow all directions for their use.  Call your doctor or health care professional for advice if you get a fever, chills or sore throat, or other symptoms of a cold or flu. Do not treat yourself. This drug decreases your body's ability to fight infections. Try to avoid being around people who are sick.  This medicine may increase your risk to bruise or bleed. Call your doctor or health care professional if you notice any unusual bleeding.  You may need blood work done  while you are taking this medicine.  In some patients, this medicine may cause a serious brain infection that may cause death. If you have any problems seeing, thinking, speaking, walking, or standing, tell your doctor right away. If you cannot reach your doctor, urgently seek other source of medical care.  Check with your doctor or health care professional if you get an attack of severe diarrhea, nausea and vomiting, or if you sweat a lot. The loss of too much body fluid can make it dangerous for you to take this medicine.  Do not become preg  nant while taking this medicine or for at least 2 months after stopping it. Women should inform their doctor if they wish to become pregnant or think they might be pregnant. Men should not father a child while taking this medicine and for at least 2 months after stopping it. There is a potential for serious side effects to an unborn child. Talk to your health care professional or pharmacist for more information. Do not breast-feed an infant while taking this medicine or for 2 months after stopping it.  This medicine may interfere with the ability to have a child. You should talk with your doctor or health care professional if you are concerned about your fertility.  NOTE:This sheet is a summary. It may not cover all possible information. If you have questions about this medicine, talk to your doctor, pharmacist, or health care provider. Copyright  2018 [employer]

## 2021-06-04 NOTE — PROGRESS NOTES
[facility] Hematology and Oncology Progress Note    Patient: Amauri Shell  MRN: [id number]  Date of Service: 12/26/19          Reason for Visit    1. Multiple Myeloma  2. Metastatic Prostate cancer    Assessment and Plan    1.  Multiple Myeloma:  Recieved radiation to L3 bone lesion and started systemic therapy. Has been on weekly Velcade and  Dexamethasone (dose-reduced to 10 mg due to intolerance on higher doses). In early December, dexamethasone was discontinued all together due to recurrent respiratory infections; he's continued weekly Velcade alone. We will have to monitor his myeloma with stopping it, overall his myeloma has been pretty stable (light chains and IgA level did go up slightly in late November). Clinically stable. Will continue weekly Velcade with repeat myeloma panel at visit in 4 weeks.    2.  Metastatic prostate cancer:   Biopsy-proven from L3 bone lesion; also had adenopathy.    Currently on Lupron 3-month injection, due again today. Patient was found to have progression with his PSA in October so he was switch from Zytiga to Xtandi.  He has been taking that for about 8 weeks at this time.  PSA in early December was slightly improved to 4.2 (down from 5.0). Will recheck PSA at next visit, after having been on the Xtandi nearly 3 mths.    3.  Recurrent respiratory infections:   Followed in Pulmonary. Hoping that with stopping the dexamethasone it may improve his immune system and his response.  No recurrent respiratory infections for past 1+month.    4. Sinus congestion  Episode last month of blowing out tissue-like material with epistaxis, since resolved. Apparently pathology testing was going to be performed, but we do not have any available reports. Still has chronic sinus congestion. No pain. No fevers. Could trial Flonase. If not improving, refer to ENT.    5. Arthralgias  Generalized. Notes since on Xtandi, so a likely side efftect. Recommend Tylenol and/or Naprosyn. Recommend  activity, as he's able. Continues on MS Contin two times a day for his chronic back/neck pain.    6. Nausea  7. Early satiety  Ongoing. Gaining weight. Continue PPI. Premed with Reglan and/or Zofran before meals. Can consider addition of Compazine for breakthrough when needed. Eat small meals and supplement with Ensure between.      ECOG Performance   ECOG Performance Status: 0     Distress Assessment  Distress Assessment Score: 4:     Pain  Currently in Pain: Yes  Pain Score (Initial OR Reassessment): 6  Location: arthritis pain: Chronic issues.  He does continue on MS Contin.  This is managed through the DOC.     ___________________________________________________________________________    History of Present Illness    Diagnosis:   1.  11/2017: Metastatic prostate cancer:   -Metastatic disease to retroperitoneal pelvic lymph nodes, bone lesion.       2. 12/2017: Solitary plasmacytoma of bone/myeloma:   -Involving L3.  Minimal bone marrow involvement.     3.  Thyroid lesion:   -Likely goiter/thyroid nodule.  PET positive.      Treatment:  1.  Metastatic prostate cancer   November 24, 2017: started on Lupron    July, 2018: Zytiga added(based on Latitude study) until progression (elevated PSA October, 2019)    October, 2019-present: Xtandi     2.    Multiple myeloma   December 6, 2017: Weekly Velcade and dexamethasone.  -off from April until May due to being [facility] to [facility]  -Dexamethasone discontinued early December, 2019 for recurrent respiratory infections thought related to immunosuppressive effect    SBRT L3 metastases      3.  Thyroid lesion:  -Observation. He had been followed every 6 months or so by ENT    Interim history:  Amauri is here today to continue on treatment.   Overall he says that he feels fairly stable. Reports ongoing nausea and early satiety/sense of intermittent bloating. Finds Zofran partially effective. It also appears he is taking Reglan. Supplementing with Ensure. Gaining weight. BMs  are between constipation to loose. Generalized arthralgias since starting the Xtandi. On MS Contin two times a day for chronic neck and back pain. No new sites of focal pain. No recurrent respiratory infections this past month. Episode of blowing bloody tissue from his nose in November has not recurred. No epistaxis. He has some chronic sinus congestion and ear popping/ringing at times.    Pain Status  Currently in Pain: Yes    Review of Systems    Constitutional  Constitutional (WDL): Exceptions to WDL  Fatigue: Concerns(very poor)  Hot flashes/Night Sweats: Concerns(every night)  Neurosensory  Neurosensory (WDL): Exceptions to WDL  Peripheral Motor Neuropathy: Concerns(hands and feet)  Eye   Eye Disorder (WDL): All eye disorder elements are within defined limits  Ear  Ear Disorder (WDL): Exceptions to WDL  Tinnitus: Concerns(right ear)  Cardiovascular  Cardiovascular (WDL): All cardiovascular elements are within defined limits  Pulmonary  Respiratory (WDL): Within Defined Limits  Gastrointestinal  Gastrointestinal (WDL): Exceptions to WDL  Anorexia: Concerns  Nausea: Concerns(only controlled mildy)  Dehydration: Concerns  Constipation: Concerns  Diarrhea: Concerns  Dry Mouth: Concerns  Genitourinary  Genitourinary (WDL): All genitourinary elements are within defined limits  Lymphatic  Lymph (WDL): All lymph disorder elements are within defined limits  Musculoskeletal and Connective Tissue  Musculoskeletal and Connetive Tissue Disorders (WDL): Exceptions to WDL  Arthralgia: Concerns(all joints)  Muscle Weakness : Concerns  Integumentary  Integumentary (WDL): All integumentary elements are within defined limits  Patient Coping  Patient Coping: Accepting  Accompanied by  Accompanied by: Law Enforcment  Oral Chemo Adherence         Past History  Past Medical History:   Diagnosis Date     GERD (gastroesophageal reflux disease)      Hiatal hernia      Hypertension      Metastatic cancer (H)      Multiple myeloma (H)       Plasmacytoma (H)      Prostate cancer (H)        PHYSICAL EXAM:  /83   Pulse 75   Temp 97.9  F (36.6  C) (Oral)   Ht 6' (1.829 m)   Wt (!) 241 lb (109.3 kg)   SpO2 93%   BMI 32.69 kg/m       GENERAL: no acute distress. Cooperative in conversation. Here with 2 guards from [facility]  HEENT: pupils are equal, round and reactive. Oromucosa is clean and intact. No ulcerations or mucositis noted. No bleeding noted.  RESP: lungs are clear bilaterally per auscultation. Regular respiratory rate. No wheezes or rhonchi.  CV: Regular, rate and rhythm. No murmurs.  ABD: soft, nontender. Positive bowel sounds. No organomegaly.   MUSCULOSKELETAL: No lower extremity swelling.   NEURO: non focal. Alert and oriented x3.   PSYCH: within normal limits. No depression or anxiety.  SKIN: warm dry intact   LYMPH: no cervical, supraclavicular lymphadenopathy    Lab Results  Lab Standing Order on 12/26/2019   Component Date Value Ref Range Status     Sodium 12/26/2019 145  136 - 145 mmol/L Final     Potassium 12/26/2019 3.8  3.5 - 5.0 mmol/L Final     Chloride 12/26/2019 106  98 - 107 mmol/L Final     CO2 12/26/2019 29  22 - 31 mmol/L Final     Anion Gap, Calculation 12/26/2019 10  5 - 18 mmol/L Final     Glucose 12/26/2019 84  70 - 125 mg/dL Final     BUN 12/26/2019 14  8 - 22 mg/dL Final     Creatinine 12/26/2019 0.82  0.70 - 1.30 mg/dL Final     GFR MDRD Af Amer 12/26/2019 >60  >60 mL/min/1.73m2 Final     GFR MDRD Non Af Amer 12/26/2019 >60  >60 mL/min/1.73m2 Final     Bilirubin, Total 12/26/2019 0.5  0.0 - 1.0 mg/dL Final     Calcium 12/26/2019 9.6  8.5 - 10.5 mg/dL Final     Protein, Total 12/26/2019 6.8  6.0 - 8.0 g/dL Final     Albumin 12/26/2019 3.7  3.5 - 5.0 g/dL Final     Alkaline Phosphatase 12/26/2019 42* 45 - 120 U/L Final     AST 12/26/2019 14  0 - 40 U/L Final     ALT 12/26/2019 11  0 - 45 U/L Final     WBC 12/26/2019 6.1  4.0 - 11.0 thou/uL Final     RBC 12/26/2019 3.64* 4.40 - 6.20 mill/uL Final     Hemoglobin  12/26/2019 11.7* 14.0 - 18.0 g/dL Final     Hematocrit 12/26/2019 36.0* 40.0 - 54.0 % Final     MCV 12/26/2019 99  80 - 100 fL Final     MCH 12/26/2019 32.1  27.0 - 34.0 pg Final     MCHC 12/26/2019 32.5  32.0 - 36.0 g/dL Final     RDW 12/26/2019 14.2  11.0 - 14.5 % Final     Platelets 12/26/2019 272  140 - 440 thou/uL Final     MPV 12/26/2019 11.0  8.5 - 12.5 fL Final     Neutrophils % 12/26/2019 62  50 - 70 % Final     Lymphocytes % 12/26/2019 18* 20 - 40 % Final     Monocytes % 12/26/2019 13* 2 - 10 % Final     Eosinophils % 12/26/2019 6  0 - 6 % Final     Basophils % 12/26/2019 1  0 - 2 % Final     Neutrophils Absolute 12/26/2019 3.8  2.0 - 7.7 thou/uL Final     Lymphocytes Absolute 12/26/2019 1.1  0.8 - 4.4 thou/uL Final     Monocytes Absolute 12/26/2019 0.8  0.0 - 0.9 thou/uL Final     Eosinophils Absolute 12/26/2019 0.3  0.0 - 0.4 thou/uL Final     Basophils Absolute 12/26/2019 0.1  0.0 - 0.2 thou/uL Final     Lab Results   Component Value Date    PSA 4.2 12/04/2019    PSA 5.0 (H) 10/23/2019    PSA 1.0 07/03/2019    PSA 0.6 04/10/2019    PSA 1.0 12/12/2018         Imaging    Ct Chest Without Contrast    Result Date: 12/9/2019  EXAM: CT CHEST WO CONTRAST LOCATION: [facility] DATE/TIME: 12/9/2019 7:22 AM INDICATION: Pneumonia recurrent pneumonias with intermittent clearing of process (had clear CXR 10/15) COMPARISON: 09/25/2019 TECHNIQUE: CT chest without IV contrast. Multiplanar reformats were obtained. Dose reduction techniques were used. CONTRAST: None. FINDINGS: LUNGS AND PLEURA: Resolved extensive infiltrates since 09/25/2019. Mild atelectasis remains in the lingula. Mild diffuse emphysema. No significant new findings. MEDIASTINUM/AXILLAE: Moderate size sliding esophageal hiatal hernia. No lymphadenopathy. Moderate coronary artery calcifications. UPPER ABDOMEN: No significant finding. MUSCULOSKELETAL: Unremarkable.     1.  Resolved extensive pulmonary infiltrates.         Signed by: Nathaly Hull  Rikki, CNP

## 2021-06-04 NOTE — PROGRESS NOTES
Pt arrived via wheelchair to clinic for Cycle # 21 Day # 8 of his chemotherapy regimen.   Administered SQ Velcade into LUQ of ABD per MD order.  Pt tolerated procedure well, no s/s of bleeding or swelling at site.  Pt left with guards via wheelchair.    Pt states that he is having difficulties with swallowing for past week.  In[facility]ed [facility].  Pt states [facility] is aware.  Instructed to continue to monitor.

## 2021-06-04 NOTE — PROGRESS NOTES
[facility] Hematology and Oncology Progress Note    Patient: Amauri Shell  MRN: [id number]  Date of Service: 12/04/19          Reason for Visit    Myeloma  Prostate cancer    Assessment and Plan    1.  Multiple Myeloma.  Did receive radiation to this bone lesion.  He also started on systemic therapy.  We will continue his weekly Velcade, dexamethasone.  Patient was on 10 mg of dexamethasone because he did not tolerate higher doses.  At this time because of recurrent infections we will stop the dexamethasone.  This appears to be the one medication that is likely altering his immune system the most.  We will have to monitor his myeloma with stopping it.  Overall his myeloma has been pretty stable.  His light chains and IgA level did go up slightly last time they were checked but we will just continue to monitor.    2.  Metastatic prostate cancer: This diagnosis was made from a biopsy of a L3 bone lesion.  He also had adenopathy.  He is currently on Lupron and in July 2018, was started on Zytiga.  Patient was found to have progression with his PSA in October so he was switch from Zytiga to Xtandi.  He has been taking that for about 5 weeks at this time.  We will check a PSA today.  I told patient we will likely keep him on the Xtandi for minimum of 2 to 3 months to know for sure if it is working or not.    3.  Recurrent infections: Patient is seen a pulmonologist.  We will continue to monitor I am hoping that with stopping the dexamethasone it may improve his immune system and his response.  He is at high risk for infection due to being on treatment as well as living in nursing home.      ECOG Performance   ECOG Performance Status: 2     Distress Assessment  Distress Assessment Score: No distress(visit related):     Pain  Currently in Pain: Yes  Pain Score (Initial OR Reassessment): 6  Location: joints: Chronic issues.  He does continue on MS Contin.  This is managed through the DOC.       Problem List    1. Multiple  myeloma not having achieved remission (H)  PSA, Diagnostic (Prostatic-Specific Antigen)   2. Prostate cancer metastatic to multiple sites (H)        ______________________________________________________________________________    History of Present Illness    Diagnosis:   1.  Metastatic prostate cancer: Metastatic disease to retroperitoneal pelvic lymph nodes, bone lesion.     2.  Solitary plasmacytoma of bone/myeloma: Involving L3.  Minimal bone marrow involvement. .   3.  Thyroid lesion: Likely goiter/thyroid nodule.  PET positive.      Treatment:  1. He was started on Lupron.  First dose November 24, 2017.  Zytiga added in July 2018 based on Latitude study.   2.  Started on treatment with Velcade and dexamethasone on 12/6/2017.  Weekly.    Was off from April until May due to being transferred to [facility].  He had radiation to the L3 metastases, I believe SBRT 20 gray ×1 fraction.  3. Observation. He had been followed every 6 months or so by ENT    Interim history:  Is here today to continue on treatment.  Patient states he is doing okay.  He said last week he started having issues like a cold again.  He said he actually coughed up some phlegm/tissue and is blowing his nose and also had some tissue that came out.  Apparently that was sent for pathology and that is not back yet.  He also states that he was put on antibiotics last week and started having some diarrhea so they did send a stool sample to check for C. difficile.  Overall he says that he feels fairly stable and that his cold might be getting a little bit better.    Pain Status  Currently in Pain: Yes    Review of Systems    Constitutional  Constitutional (WDL): Exceptions to WDL  Fatigue: Concerns(not relieved with rest)  Chills: Concerns  Hot flashes/Night Sweats: Concerns  Neurosensory  Neurosensory (WDL): Exceptions to WDL  Peripheral Motor Neuropathy: Concerns  Peripheral Sensory Neuropathy: Concerns  Dizziness: Concerns(with position changes)  Eye    Eye Disorder (WDL): Exceptions to WDL  Blurred Vision: Concerns(related to chemo )  Watering Eyes: Concerns  Ear  Ear Disorder (WDL): Exceptions to WDL  Ear Pain: Concerns(right ear)  Cardiovascular  Cardiovascular (WDL): All cardiovascular elements are within defined limits  Pulmonary  Respiratory (WDL): Exceptions to WDL  Dyspnea: Concerns(with activity)  Gastrointestinal  Gastrointestinal (WDL): Exceptions to WDL  Anorexia: Concerns  Diarrhea: Concerns  Genitourinary  Genitourinary (WDL): Exceptions to WDL  Urinary Tract Pain: Concerns  Lymphatic  Lymph (WDL): All lymph disorder elements are within defined limits  Musculoskeletal and Connective Tissue  Musculoskeletal and Connetive Tissue Disorders (WDL): Exceptions to WDL(pain in right arm and in legs)  Arthralgia: Concerns  Myalgia: Concerns  Integumentary  Integumentary (WDL): All integumentary elements are within defined limits  Patient Coping  Patient Coping: Accepting;Open/discussion  Accompanied by  Accompanied by: Law Enforcment  Oral Chemo Adherence         Past History  Past Medical History:   Diagnosis Date     GERD (gastroesophageal reflux disease)      Hiatal hernia      Hypertension      Metastatic cancer (H)      Multiple myeloma (H)      Plasmacytoma (H)      Prostate cancer (H)        PHYSICAL EXAM:  BP (!) 150/91   Pulse 80   Temp 98.1  F (36.7  C) (Oral)   Ht 6' (1.829 m)   Wt (!) 241 lb 3.2 oz (109.4 kg)   SpO2 92%   BMI 32.71 kg/m    GENERAL: no acute distress. Cooperative in conversation. Here with 2 guards from DOC  HEENT: pupils are equal, round and reactive. Oromucosa is clean and intact. No ulcerations or mucositis noted. No bleeding noted.  RESP: lungs are clear bilaterally per auscultation. Regular respiratory rate. No wheezes or rhonchi.  CV: Regular, rate and rhythm. No murmurs.  ABD: soft, nontender. Positive bowel sounds. No organomegaly.   MUSCULOSKELETAL: No lower extremity swelling.   NEURO: non focal. Alert and oriented  x3.   PSYCH: within normal limits. No depression or anxiety.  SKIN: warm dry intact   LYMPH: no cervical, supraclavicular lymphadenopathy    Lab Results  Lab Standing Order on 11/27/2019   Component Date Value Ref Range Status     Immunoglobulin G 11/27/2019 838  700-1,700 mg/dL Final     Immunoglobulin M 11/27/2019 39* 60 - 280 mg/dL Final     Immunoglobulin A 11/27/2019 566* 65 - 400 mg/dL Final     [place] Free Lt Chain 11/27/2019 2.25* 0.33 - 1.94 mg/dL Final     Lambda Free Lt Chain 11/27/2019 1.00  0.57 - 2.63 mg/dL Final     Kappa Lambda Ratio 11/27/2019 2.25* 0.26 - 1.65 Final      Performed and/or entered by:  [address] [id number]      Albumin % 11/27/2019 55.9  51.0 - 67.0 % Final     Albumin  11/27/2019 3.7  3.2 - 4.7 g/dL Final     Alpha 1 % 11/27/2019 3.3  2.0 - 4.0 % Final     Alpha 1 11/27/2019 0.2  0.1 - 0.3 g/dL Final     Alpha 2 % 11/27/2019 12.7  5.0 - 13.0 % Final     Alpha 2 11/27/2019 0.9  0.4 - 0.9 g/dL Final     % Beta 11/27/2019 13.3  10.0 - 17.0 % Final     Beta 11/27/2019 0.9  0.7 - 1.2 g/dL Final     Gamma Globulin % 11/27/2019 14.8  9.0 - 20.0 % Final     Gamma Globulin 11/27/2019 1.0  0.6 - 1.4 g/dL Final     ELP Comment 11/27/2019 Two small monoclonal peaks detected in gamma region. Suggest immunofixation electrophoresis to further characterize.    Final     Protein, Total 11/27/2019 6.7  6.0 - 8.0 g/dL Final     Monoclonal Peak 11/27/2019    Final    Peak 1= 0.2 g/dL  Peak 2= 0.1 g/dL       Path ICD: 11/27/2019 C90.00   Final     Interpreted By: 11/27/2019 Kavin Muñiz MD   Final     Sodium 11/27/2019 143  136 - 145 mmol/L Final     Potassium 11/27/2019 3.6  3.5 - 5.0 mmol/L Final     Chloride 11/27/2019 107  98 - 107 mmol/L Final     CO2 11/27/2019 29  22 - 31 mmol/L Final     Anion Gap, Calculation 11/27/2019 7  5 - 18 mmol/L Final     Glucose 11/27/2019 104  70 - 125 mg/dL Final     BUN 11/27/2019 11  8 - 22 mg/dL  Final     Creatinine 11/27/2019 0.79  0.70 - 1.30 mg/dL Final     GFR MDRD Af Amer 11/27/2019 >60  >60 mL/min/1.73m2 Final     GFR MDRD Non Af Amer 11/27/2019 >60  >60 mL/min/1.73m2 Final     Bilirubin, Total 11/27/2019 0.3  0.0 - 1.0 mg/dL Final     Calcium 11/27/2019 9.7  8.5 - 10.5 mg/dL Final     Protein, Total 11/27/2019 7.2  6.0 - 8.0 g/dL Final     Albumin 11/27/2019 3.4* 3.5 - 5.0 g/dL Final     Alkaline Phosphatase 11/27/2019 46  45 - 120 U/L Final     AST 11/27/2019 15  0 - 40 U/L Final     ALT 11/27/2019 <9  0 - 45 U/L Final     WBC 11/27/2019 8.1  4.0 - 11.0 thou/uL Final     RBC 11/27/2019 3.73* 4.40 - 6.20 mill/uL Final     Hemoglobin 11/27/2019 11.9* 14.0 - 18.0 g/dL Final     Hematocrit 11/27/2019 36.4* 40.0 - 54.0 % Final     MCV 11/27/2019 98  80 - 100 fL Final     MCH 11/27/2019 31.9  27.0 - 34.0 pg Final     MCHC 11/27/2019 32.7  32.0 - 36.0 g/dL Final     RDW 11/27/2019 14.2  11.0 - 14.5 % Final     Platelets 11/27/2019 402  140 - 440 thou/uL Final     MPV 11/27/2019 10.4  8.5 - 12.5 fL Final     Neutrophils % 11/27/2019 73* 50 - 70 % Final     Lymphocytes % 11/27/2019 12* 20 - 40 % Final     Monocytes % 11/27/2019 10  2 - 10 % Final     Eosinophils % 11/27/2019 4  0 - 6 % Final     Basophils % 11/27/2019 1  0 - 2 % Final     Neutrophils Absolute 11/27/2019 5.9  2.0 - 7.7 thou/uL Final     Lymphocytes Absolute 11/27/2019 1.0  0.8 - 4.4 thou/uL Final     Monocytes Absolute 11/27/2019 0.8  0.0 - 0.9 thou/uL Final     Eosinophils Absolute 11/27/2019 0.3  0.0 - 0.4 thou/uL Final     Basophils Absolute 11/27/2019 0.0  0.0 - 0.2 thou/uL Final     Lab Results   Component Value Date    PSA 5.0 (H) 10/23/2019    PSA 1.0 07/03/2019    PSA 0.6 04/10/2019    PSA 1.0 12/12/2018    PSA 1.2 11/14/2018   ]      Imaging    No results found.      Signed by: Aline Roth, CNP

## 2021-06-04 NOTE — PROGRESS NOTES
Pt here for treatment after seeing NP. Pt received velcade and eligard injections today without incident. Upon completion pt d/c in wheel chair to lobby with DOC guards.

## 2021-06-04 NOTE — PROGRESS NOTES
Patient is here for labs, provider, and treatment for Multiple myeloma not having achieved remission.

## 2021-06-05 NOTE — PROGRESS NOTES
Pt here for injection after seeing MD. No problem with injection in R abdomen. Pt d/c in wheel chair to lobby with DOC guards.

## 2021-06-05 NOTE — PROGRESS NOTES
Amauri Shell, 59 y.o., male arrived in wheelchair accompanied by two law enforcement officers at 0805 for Cycle #22 Day #8 of his chemotherapy regimen. Administered Velcade SQ per MD order. He tolerated injection well. Site covered with gauze and paper-tape. Amauri Shell verbalized understanding of plan of care. [facility] two law enforcement officers.

## 2021-06-05 NOTE — TELEPHONE ENCOUNTER
DOC nurse Cheri calls in today stating that she would like to discuss this patient and his Lupron injections.  When I called her back she did not answer so a detailed message was left asking her to give me a call back.  Will await a return call.    Mirtha Crawford RN

## 2021-06-05 NOTE — PROGRESS NOTES
Pt arrived with security officers x2 for Velcade injection. Injection given into left abdomen without difficulties. Pt left ambulatory after injection.

## 2021-06-05 NOTE — PROGRESS NOTES
Pt arrived to infusion clinic. Velcade injection given to RLQ of ABD, Band-aid applied. Pt ambulated out of infusion clinic independently with 2 escorts.

## 2021-06-05 NOTE — PROGRESS NOTES
Pt here for injection. Which was given in abdomen without incident. Pt continues to be treated for c-diff otherwise no new complaints. Upon completion pt d/c in wheel chair to lobby with DOC guards.

## 2021-06-05 NOTE — TELEPHONE ENCOUNTER
Nurse from [facility] calls in today stating that the [facility] insurance states requires the patient to have his Lupron injections at the [facility].  He can no longer get them at our facility.  He is currently getting Lupron 22.5 mg subcutaneous every 3 months.  His last dose was on 12/26/2019 and he will be due again on 3/19/2020.  I let her know that I will pass this along to the patient's care team so they are aware.  She states that our office can still do the assessing and recommendations but they need to do the injection at the [facility].    Mirtha Crawford RN

## 2021-06-05 NOTE — PROGRESS NOTES
"[facility] Hematology and Oncology Progress Note    Patient: Amauri Shell  MRN: [id number]  Date of Service: 01/21/2020      Assessment and Plan:    1.   Myeloma: Laboratory studies from today are pending.  We will continue with his current treatment regimen of just weekly Velcade.  He has been on this treatment now for just over 2 years. He had a slight raise in his light chain ratio in October.  We will see what they are today.  If they are elevated, and he meets criteria for progression then we will make new recommendations.  Otherwise repeat labs in follow-up in 3 months.  He is on acyclovir 400 mg twice a day.  He is also on Eliquis.    2.  Metastatic prostate cancer: On Lupron every 3 months and Enzalutamide.  His PSA has been trending down since October indicating a response.  Next Lupron dose will be in March.    3.  Thyroid nodule:  Previous ultrasound was consistent with diffuse thyroiditis.  We will continue to follow with thyroid studies.    ECOG Performance   ECOG Performance Status: 2    Distress Assessment  Distress Assessment Score: 4     Pain  Currently in Pain: Yes  Pain Score (Initial OR Reassessment): 5  Location: low back, right hip    Diagnosis:    1.  Plasma cell dyscrasia:  IgA kappa. Diagnosed November 2017.  Diagnostically, it has been somewhat of a challenge.  He does not appear to have any lytic lesions but does appear to have malignant plasma cells in a sclerotic lesion where there was also found metastatic prostate cancer.  His initial bone marrow biopsy report was reviewed by our pathologists count 10-15% malignant plasma cells. No \"CRAB\" findings.  He has not clearly fulfill the diagnostic criteria by the international myeloma working group.  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    2.  Metastatic prostate cancer:  Metastatic disease to retroperitoneal and pelvic lymph nodes.  Also L3.  Diagnosed late 2017 from a biopsy of an " L3 bone lesion.  PSA at diagnosis was 73.    3.  Thyroid lesion: Likely goiter/thyroid nodule.  PET positive.  He had been followed every 6 months or so by ENT    4.  Significant lumbar stenosis at L5-S1.    Treatment:    1.  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  Days 1, 8, 15 of a 21 day cycle.  He also had radiation to the L3 metastasis, I believe SBRT 20 ken ×1 fraction.  He required a dose reduction of dexamethasone due to side effects.  Dexamethasone ultimately stopped in December 2019 secondary to recurrent infections.    2.  Prostate cancer: Started on Lupron in November 2017.  He receives this every 3 months.  Abiraterone added in July, 2018.  PSA progression in October 2019 (up to 5). Abiraterone switched to enzalutamide in October, 2019.    Interim History: Regular rate and rhythm.  No murmur.    Amauri returns today for follow-up visit.  Overall he is been doing okay.  May be less hot flashes and sweats since changing to enzalutamide.  Pain is controlled with his current regimen.  No acute complaints today.  No acute respiratory symptoms today.      Review of Systems:    Constitutional  Constitutional (WDL): Exceptions to WDL  Fatigue: Fatigue not relieved by rest - Limiting instrumental ADL  Weight Loss: to <10% from baseline, intervention not indicated(6lbs)  Neurosensory  Neurosensory (WDL): Exceptions to WDL  Peripheral Motor Neuropathy: Asymptomatic, clinical or diagnostic observations only, intervention not indicated  Ataxia: Asymptomatic, clinical or diagnostic observations only, intervention not indicated  Peripheral Sensory Neuropathy: Asymptomatic, loss of deep tendon reflexes or paresthesia  Cardiovascular  Cardiovascular (WDL): All cardiovascular elements are within defined limits  Pulmonary  Respiratory (WDL): Exceptions to WDL  Dyspnea: Shortness of breath with moderate exertion  Gastrointestinal  Gastrointestinal (WDL): Exceptions to  WDL  Anorexia: Loss of appetite without alteration in eating habits  Diarrhea: Increase of 4 - 6 stools per day over baseline, moderate increase in ostomy output compared to baseline(c diff)  Dysphagia: Symptomatic, able to eat regular diet(dry throat)  Nausea: Loss of appetite without alteration in eating habits  Genitourinary  Genitourinary (WDL): All genitourinary elements are within defined limits  Integumentary  Integumentary (WDL): All integumentary elements are within defined limits  Patient Coping  Patient Coping: Accepting  Accompanied by  Accompanied by: Law Enforcment    Past History:    Past Medical History:   Diagnosis Date     GERD (gastroesophageal reflux disease)      Hiatal hernia      Hypertension      Metastatic cancer (H)      Multiple myeloma (H)      Plasmacytoma (H)      Prostate cancer (H)      Physical Exam:    Recent Vitals 1/21/2020   Height -   Weight 234 lbs   BSA (m2) 2.32 m2   /98   Pulse 79   Temp 98.4   Temp src 1   SpO2 92   Some recent data might be hidden     General: patient appears stated age of 59 y.o.. Nontoxic and in no distress.   HEENT: Head: atraumatic, normocephalic. Sclerae anicteric.  Chest:  Normal respiratory effort.    Cardiac:  No edema.   Abdomen: abdomen is non-distended  Extremities: normal tone and muscle bulk.  Skin: no lesions or rash. Warm and dry.   CNS: alert and oriented. Grossly non-focal.   Psychiatric: normal mood and affect.     Lab Results:    Recent Results (from the past 240 hour(s))   PSA (Prostatic-Specific Antigen), Diagnostic    Collection Time: 01/14/20  8:46 AM   Result Value Ref Range    PSA 3.3 0.0 - 4.5 ng/mL   Comprehensive Metabolic Panel    Collection Time: 01/21/20  8:50 AM   Result Value Ref Range    Sodium 140 136 - 145 mmol/L    Potassium 3.7 3.5 - 5.0 mmol/L    Chloride 105 98 - 107 mmol/L    CO2 27 22 - 31 mmol/L    Anion Gap, Calculation 8 5 - 18 mmol/L    Glucose 90 70 - 125 mg/dL    BUN 15 8 - 22 mg/dL    Creatinine 0.78  0.70 - 1.30 mg/dL    GFR MDRD Af Amer >60 >60 mL/min/1.73m2    GFR MDRD Non Af Amer >60 >60 mL/min/1.73m2    Bilirubin, Total 0.3 0.0 - 1.0 mg/dL    Calcium 9.5 8.5 - 10.5 mg/dL    Protein, Total 6.8 6.0 - 8.0 g/dL    Albumin 3.8 3.5 - 5.0 g/dL    Alkaline Phosphatase 45 45 - 120 U/L    AST 15 0 - 40 U/L    ALT <9 0 - 45 U/L   HM1 (CBC with Diff)    Collection Time: 01/21/20  8:50 AM   Result Value Ref Range    WBC 7.5 4.0 - 11.0 thou/uL    RBC 3.75 (L) 4.40 - 6.20 mill/uL    Hemoglobin 12.1 (L) 14.0 - 18.0 g/dL    Hematocrit 38.2 (L) 40.0 - 54.0 %     (H) 80 - 100 fL    MCH 32.3 27.0 - 34.0 pg    MCHC 31.7 (L) 32.0 - 36.0 g/dL    RDW 14.2 11.0 - 14.5 %    Platelets 251 140 - 440 thou/uL    MPV 12.1 8.5 - 12.5 fL    Neutrophils % 70 50 - 70 %    Lymphocytes % 15 (L) 20 - 40 %    Monocytes % 12 (H) 2 - 10 %    Eosinophils % 2 0 - 6 %    Basophils % 0 0 - 2 %    Neutrophils Absolute 5.2 2.0 - 7.7 thou/uL    Lymphocytes Absolute 1.1 0.8 - 4.4 thou/uL    Monocytes Absolute 0.9 0.0 - 0.9 thou/uL    Eosinophils Absolute 0.2 0.0 - 0.4 thou/uL    Basophils Absolute 0.0 0.0 - 0.2 thou/uL     Imaging:    No new imaging.      Signed by: Cosmo Carl MD

## 2021-06-06 NOTE — PROGRESS NOTES
[facility] Hematology and Oncology Progress Note    Patient: Amauri Shell  MRN: [id number]  Date of Service: 03/17/20          Reason for Visit    Myeloma  Prostate cancer    Assessment and Plan    1.  Multiple Myeloma.  Did receive radiation to this bone lesion.  He also started on systemic therapy.  We will continue his weekly Velcade. Dex stopped in December due to recurrent infections. Myeloma has been overall stable with some fluctuation. Will continue to monitor.     2.  Metastatic prostate cancer: This diagnosis was made from a biopsy of a L3 bone lesion.  He also had adenopathy.  He is currently on Lupron and in July 2018, was started on Zytiga.  Patient was found to have progression with his PSA in October 2019 so he was switch from Zytiga to Xtandi.  PSA has improved. Pending from today. Due for lupron today. No change.     3.  Recurrent infections: dex off. Monitor symptoms. IgG levels normal.       ECOG Performance   ECOG Performance Status: 1     Distress Assessment  Distress Assessment Score: 1:     Pain  Currently in Pain: Yes  Pain Score (Initial OR Reassessment): 4: Chronic issues.  He does continue on MS Contin.  This is managed through the DOC.       Problem List    1. Prostate cancer metastatic to multiple sites (H)     2. Multiple myeloma not having achieved remission (H)        ______________________________________________________________________________    History of Present Illness    Diagnosis:   1.  Metastatic prostate cancer: Metastatic disease to retroperitoneal pelvic lymph nodes, bone lesion.     2.  Solitary plasmacytoma of bone/myeloma: Involving L3.  Minimal bone marrow involvement. .   3.  Thyroid lesion: Likely goiter/thyroid nodule.  PET positive.      Treatment:  1. He was started on Lupron.  First dose November 24, 2017.  continues on that.   Zytiga added in July 2018 based on Lat[employer] study.   Progression in October 2019 so switched from Zytiga to Xtendi.     2.  Started on  treatment with Velcade and dexamethasone on 12/6/2017.  Weekly.    Was off from April until May due to being transferred to residential.  He had radiation to the L3 metastases, I believe SBRT 20 gray ×1 fraction.    3. Observation. He had been followed every 6 months or so by ENT    Interim history:  Is here today to continue on treatment.  Patient states he is doing well today.  He was hospitalized again just overnight a couple of weeks ago for pneumonia.  He says he feels much better.  Overall just says that he is at his baseline is doing fine.  Still has mild neuropathy.  Mild night sweats.    Pain Status  Currently in Pain: Yes    Review of Systems    Constitutional  Constitutional (WDL): Exceptions to WDL  Hot flashes/Night Sweats: Concerns  Neurosensory  Neurosensory (WDL): Exceptions to WDL  Peripheral Motor Neuropathy: Concerns  Peripheral Sensory Neuropathy: Concerns  Eye   Eye Disorder (WDL): All eye disorder elements are within defined limits  Ear  Ear Disorder (WDL): Exceptions to WDL  Tinnitus: Concerns  Cardiovascular  Cardiovascular (WDL): All cardiovascular elements are within defined limits  Pulmonary  Respiratory (WDL): Within Defined Limits  Gastrointestinal  Gastrointestinal (WDL): Exceptions to WDL  Diarrhea: Concerns  Genitourinary  Genitourinary (WDL): All genitourinary elements are within defined limits  Lymphatic  Lymph (WDL): All lymph disorder elements are within defined limits  Musculoskeletal and Connective Tissue  Musculoskeletal and Connetive Tissue Disorders (WDL): Exceptions to WDL  Arthralgia: Concerns  Muscle Weakness : Concerns  Myalgia: Concerns  Integumentary  Integumentary (WDL): All integumentary elements are within defined limits  Patient Coping  Patient Coping: Accepting;Open/discussion  Accompanied by  Accompanied by: Law Enforcment  Oral Chemo Adherence         Past History  Past Medical History:   Diagnosis Date     GERD (gastroesophageal reflux disease)      Hiatal hernia       Hypertension      Metastatic cancer (H)      Multiple myeloma (H)      Plasmacytoma (H)      Prostate cancer (H)        PHYSICAL EXAM:  /75   Pulse 75   Temp 98  F (36.7  C) (Oral)   Ht 6' (1.829 m)   Wt (!) 236 lb (107 kg)   SpO2 94%   BMI 32.01 kg/m    GENERAL: no acute distress. Cooperative in conversation. Here with 2 guards from DOC  HEENT: pupils are equal, round and reactive. Oromucosa is clean and intact. No ulcerations or mucositis noted. No bleeding noted.  RESP: lungs are clear bilaterally per auscultation. Regular respiratory rate. No wheezes or rhonchi.  CV: Regular, rate and rhythm. No murmurs.  ABD: soft, nontender. Positive bowel sounds. No organomegaly.   MUSCULOSKELETAL: No lower extremity swelling.   NEURO: non focal. Alert and oriented x3.   PSYCH: within normal limits. No depression or anxiety.  SKIN: warm dry intact   LYMPH: no cervical, supraclavicular lymphadenopathy    Lab Results  Lab Standing Order on 03/17/2020   Component Date Value Ref Range Status     WBC 03/17/2020 6.4  4.0 - 11.0 thou/uL Final     RBC 03/17/2020 3.51* 4.40 - 6.20 mill/uL Final     Hemoglobin 03/17/2020 11.6* 14.0 - 18.0 g/dL Final     Hematocrit 03/17/2020 35.6* 40.0 - 54.0 % Final     MCV 03/17/2020 101* 80 - 100 fL Final     MCH 03/17/2020 33.0  27.0 - 34.0 pg Final     MCHC 03/17/2020 32.6  32.0 - 36.0 g/dL Final     RDW 03/17/2020 14.4  11.0 - 14.5 % Final     Platelets 03/17/2020 283  140 - 440 thou/uL Final     MPV 03/17/2020 10.4  8.5 - 12.5 fL Final     Neutrophils % 03/17/2020 66  50 - 70 % Final     Lymphocytes % 03/17/2020 18* 20 - 40 % Final     Monocytes % 03/17/2020 12* 2 - 10 % Final     Eosinophils % 03/17/2020 3  0 - 6 % Final     Basophils % 03/17/2020 1  0 - 2 % Final     Neutrophils Absolute 03/17/2020 4.3  2.0 - 7.7 thou/uL Final     Lymphocytes Absolute 03/17/2020 1.1  0.8 - 4.4 thou/uL Final     Monocytes Absolute 03/17/2020 0.8  0.0 - 0.9 thou/uL Final     Eosinophils Absolute  03/17/2020 0.2  0.0 - 0.4 thou/uL Final     Basophils Absolute 03/17/2020 0.0  0.0 - 0.2 thou/uL Final   Infusion on 03/10/2020   Component Date Value Ref Range Status     Kappa Free Lt Chain 03/10/2020 2.16* 0.33 - 1.94 mg/dL Final     Lambda Free Lt Chain 03/10/2020 0.64  0.57 - 2.63 mg/dL Final     Kappa Lambda Ratio 03/10/2020 3.38* 0.26 - 1.65 Final      Performed and/or entered by:  [address] [address]      Albumin % 03/10/2020 61.2  51.0 - 67.0 % Final     Albumin  03/10/2020 4.0  3.2 - 4.7 g/dL Final     Alpha 1 % 03/10/2020 2.8  2.0 - 4.0 % Final     Alpha 1 03/10/2020 0.2  0.1 - 0.3 g/dL Final     Alpha 2 % 03/10/2020 10.8  5.0 - 13.0 % Final     Alpha 2 03/10/2020 0.7  0.4 - 0.9 g/dL Final     % Beta 03/10/2020 12.1  10.0 - 17.0 % Final     Beta 03/10/2020 0.8  0.7 - 1.2 g/dL Final     Gamma Globulin % 03/10/2020 13.1  9.0 - 20.0 % Final     Gamma Globulin 03/10/2020 0.9  0.6 - 1.4 g/dL Final     ELP Comment 03/10/2020 Two small monoclonal peaks detected in gamma region. One of them is too faint to quantify. Suggest immunofixation electrophoresis for further characterization.   Final     Protein, Total 03/10/2020 6.6  6.0 - 8.0 g/dL Final     Monoclonal Peak 03/10/2020 0.2  g/dL Final     Path ICD: 03/10/2020 C90.00   Final     Interpreted By: 03/10/2020 Bekah Hunt MD   Final     Sodium 03/10/2020 141  136 - 145 mmol/L Final     Potassium 03/10/2020 3.9  3.5 - 5.0 mmol/L Final     Chloride 03/10/2020 104  98 - 107 mmol/L Final     CO2 03/10/2020 29  22 - 31 mmol/L Final     Anion Gap, Calculation 03/10/2020 8  5 - 18 mmol/L Final     Glucose 03/10/2020 91  70 - 125 mg/dL Final     BUN 03/10/2020 17  8 - 22 mg/dL Final     Creatinine 03/10/2020 0.85  0.70 - 1.30 mg/dL Final     GFR MDRD Af Amer 03/10/2020 >60  >60 mL/min/1.73m2 Final     GFR MDRD Non Af Amer 03/10/2020 >60  >60 mL/min/1.73m2 Final     Bilirubin, Total 03/10/2020 0.3   0.0 - 1.0 mg/dL Final     Calcium 03/10/2020 9.6  8.5 - 10.5 mg/dL Final     Protein, Total 03/10/2020 7.1  6.0 - 8.0 g/dL Final     Albumin 03/10/2020 3.8  3.5 - 5.0 g/dL Final     Alkaline Phosphatase 03/10/2020 39* 45 - 120 U/L Final     AST 03/10/2020 14  0 - 40 U/L Final     ALT 03/10/2020 <9  0 - 45 U/L Final     WBC 03/10/2020 7.5  4.0 - 11.0 thou/uL Final     RBC 03/10/2020 3.53* 4.40 - 6.20 mill/uL Final     Hemoglobin 03/10/2020 11.6* 14.0 - 18.0 g/dL Final     Hematocrit 03/10/2020 35.1* 40.0 - 54.0 % Final     MCV 03/10/2020 99  80 - 100 fL Final     MCH 03/10/2020 32.9  27.0 - 34.0 pg Final     MCHC 03/10/2020 33.0  32.0 - 36.0 g/dL Final     RDW 03/10/2020 14.4  11.0 - 14.5 % Final     Platelets 03/10/2020 355  140 - 440 thou/uL Final     MPV 03/10/2020 9.6  8.5 - 12.5 fL Final     Neutrophils % 03/10/2020 63  50 - 70 % Final     Lymphocytes % 03/10/2020 23  20 - 40 % Final     Monocytes % 03/10/2020 10  2 - 10 % Final     Eosinophils % 03/10/2020 4  0 - 6 % Final     Basophils % 03/10/2020 1  0 - 2 % Final     Neutrophils Absolute 03/10/2020 4.7  2.0 - 7.7 thou/uL Final     Lymphocytes Absolute 03/10/2020 1.7  0.8 - 4.4 thou/uL Final     Monocytes Absolute 03/10/2020 0.7  0.0 - 0.9 thou/uL Final     Eosinophils Absolute 03/10/2020 0.3  0.0 - 0.4 thou/uL Final     Basophils Absolute 03/10/2020 0.1  0.0 - 0.2 thou/uL Final   Lab Standing Order on 03/10/2020   Component Date Value Ref Range Status     Immunoglobulin G 03/10/2020 866  700-1,700 mg/dL Final     Immunoglobulin M 03/10/2020 30* 60 - 280 mg/dL Final     Immunoglobulin A 03/10/2020 524* 65 - 400 mg/dL Final     Lab Results   Component Value Date    PSA 3.3 01/14/2020    PSA 4.2 12/04/2019    PSA 5.0 (H) 10/23/2019    PSA 1.0 07/03/2019    PSA 0.6 04/10/2019   ]      Imaging    Ct Chest Without Contrast    Result Date: 3/2/2020  EXAM: CT CHEST WO CONTRAST LOCATION: [facility] DATE/TIME: 3/2/2020 10:33 AM INDICATION: Concern for occult  PNA, hx of PE, pain and cough. COMPARISON: 12/09/2019. TECHNIQUE: CT chest without IV contrast. Multiplanar reformats were obtained. Dose reduction techniques were used. CONTRAST: None. FINDINGS: LUNGS AND PLEURA: New right lung predominant groundglass, centrilobular and tree-in-bud nodular opacities. Increased mild right lung predominant airway thickening. New right base predominant septal thickening. Stable emphysema. No pleural effusion. MEDIASTINUM/AXILLAE: No adenopathy. Coronary and aortic calcifications. Moderate hiatus hernia. UPPER ABDOMEN: No acute findings. MUSCULOSKELETAL: No acute findings.     1.  New mild right lung predominant infectious / inflammatory findings. Aspiration not excluded. 2.  Airways disease. Increased right sided predominant airway thickening. NOTE: ABNORMAL REPORT THE DICTATION ABOVE DESCRIBES AN ABNORMALITY FOR WHICH FOLLOW-UP IS NEEDED.         Signed by: Aline Roth, CLARISSA

## 2021-06-06 NOTE — PROGRESS NOTES
PT here ambulatory with two guards for velcade inj. PT states pain continues in back and intermittent nausea with oral chemo. Velcade inj given with bandaid to site. Grey folder given to guards and pt dc'd in wheelchair. PT tolerated inj without any problems.

## 2021-06-06 NOTE — PROGRESS NOTES
Pt arrived to infusion clinic. Labs drawn and reviewed. Velcade injection given to RLQ of ABD, Band-aid applied. Pt ambulated out of infusion clinic independently with escorts.

## 2021-06-06 NOTE — PROGRESS NOTES
Pt arrived via wheelchair to clinic for Cycle # 23 Day # 1 of his chemotherapy regimen.   Administered SQ Velcade into RUQ of ABD per MD order.  Pt tolerated procedure well, no s/s of bleeding or swelling at site.  Pt left with guards via wheelchair.

## 2021-06-06 NOTE — PROGRESS NOTES
Pt here accompanied by security x2 for labs, visit with Marzena, and  treatment. Pt  Received velcade injection and Eligard injection without incident. Bandaids applied. Pt left after treatment.

## 2021-06-07 NOTE — TELEPHONE ENCOUNTER
Received email from the longterm on 4/10/20 stating [place] All future appointment needs to be cancel as [place].  Forwarded email TO Dr bhatt and his team.

## 2021-06-07 NOTE — PROGRESS NOTES
PT here ambulatory for velcade inj. PT is with two guards. PT states arthritis and neuropathy cause pain. He did have xray on hands confirming arthritis. Velcade inj given with bandaid to site. Follow up written in grey folder and given to guards. PT dc'd in wheelchair with two guards.

## 2021-06-07 NOTE — PROGRESS NOTES
PT here with two guards for velcade inj. PT states neuropathy and arthritis in hands tends to be painful. Fatigue and decreased appetite reported. Velcade reviewed and given with bandaid to site. PT dc'd with guards.

## 2021-06-07 NOTE — PROGRESS NOTES
Pt arrived to infusion clinic. Velcade injection given to RLQ of ABD, Band-aid applied. Pt escorted out of infusion clinic via WC.

## 2021-06-16 PROBLEM — I26.99 OTHER PULMONARY EMBOLISM WITHOUT ACUTE COR PULMONALE (H): Status: ACTIVE | Noted: 2019-11-19

## 2021-06-16 PROBLEM — J32.8 OTHER CHRONIC SINUSITIS: Status: ACTIVE | Noted: 2019-12-26

## 2021-06-16 PROBLEM — C61 PROSTATE CANCER METASTATIC TO MULTIPLE SITES (H): Status: ACTIVE | Noted: 2018-05-02

## 2021-06-16 PROBLEM — G62.0 PERIPHERAL NEUROPATHY DUE TO CHEMOTHERAPY (H): Status: ACTIVE | Noted: 2020-02-18

## 2021-06-16 PROBLEM — M51.369 DEGENERATION OF LUMBAR INTERVERTEBRAL DISC: Status: ACTIVE | Noted: 2018-06-15

## 2021-06-16 PROBLEM — J18.9 PNEUMONIA: Status: ACTIVE | Noted: 2020-03-02

## 2021-06-16 PROBLEM — C90.00 MULTIPLE MYELOMA NOT HAVING ACHIEVED REMISSION (H): Status: ACTIVE | Noted: 2018-06-15

## 2021-06-16 PROBLEM — J18.9 COMMUNITY ACQUIRED PNEUMONIA OF BOTH LOWER LOBES: Status: ACTIVE | Noted: 2020-03-02

## 2021-06-16 PROBLEM — E06.5 THYROIDITIS, CHRONIC: Status: ACTIVE | Noted: 2018-06-15

## 2021-06-16 PROBLEM — R11.0 NAUSEA: Status: ACTIVE | Noted: 2019-12-26

## 2021-06-16 PROBLEM — J18.9 RECURRENT PNEUMONIA: Status: ACTIVE | Noted: 2019-11-19

## 2021-06-16 PROBLEM — T45.1X5A PERIPHERAL NEUROPATHY DUE TO CHEMOTHERAPY (H): Status: ACTIVE | Noted: 2020-02-18

## 2021-06-17 NOTE — CONSULTS
[facility] Hematology and Oncology Consult Note    Patient: Amauri Shell  MRN: [id number]  Date of Service: 04/27/2018      Reason for Visit:    1.  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  Metastatic prostate cancer    Assessment/Plan:    1.  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  He was also started on systemic therapy.  As such, I would complete 6 months of treatment and then observe him as I do not think he truly has systemic myeloma.  All of the objective data points to MGUS of the bone marrow.  We will start him back up on treatment.  We will get his old records from his previous provider in terms of treatment schedule.  He can follow labs every 8 weeks.    2.  Metastatic prostate cancer: Diagnosis made from biopsy of an L3 bone lesion.  Also has evidence of pelvic and retroperitoneal adenopathy.  On single agent Lupron.  PSA is responding.  We will eventually add abiraterone per the LATTITUDE trial.  we will need to find out when his most recent dose of Lupron was.  We will continue with every 3 month dosing.  He thinks his most recent dose was on April 19.  We will need to add Zometa as well.    3.  Thyroid nodule: I would like to get an ultrasound to better define the imaging characteristics and risk of malignancy.  Will check thyroid studies.     4.  PET positive mediastinal adenopathy: Recently found in April.  No focal lung lesion.  He had some upper lobe groundglass opacity.  He blasts of 1 of the lymph nodes was negative.  We will get a follow-up in about a month to document resolution of disease.  If he still has hypermetabolic adenopathy then we will repeat a biopsy.  He is not having any pulmonary symptoms at the current time.    5.  Significant lumbar stenosis at L5-S1: We will refer him to the [facility] spine clinic an epidural steroid injection.    ECOG  Performance   ECOG Performance Status: 2    Distress Assessment  Distress Assessment Score: 2    Problem List:    1. Plasmacytoma     2. Malignant neoplasm of prostate       1.  Metastatic prostate cancer: Metastatic disease to retroperitoneal pelvic lymph nodes.  He was started on Lupron.  First dose November 24, 2017.      2.  Solitary plasmacytoma of bone/myeloma: Involving L3.  Minimal bone marrow involvement.  Started on treatment with Velcade and dexamethasone on 12/6/2017.  Day 1, 8, 15 of a 21 day cycle.  He had radiation to the L3 metastases, I believe SBRT 20 gray ×1 fraction..    3.  Thyroid lesion: Likely goiter/thyroid nodule.  PET positive.  He had been followed every 6 months or so by ENT    Staging History:    No matching staging information was found for the patient.    History:    Mr. Shell is a 57-year-old gentleman who comes in today to continue treatment of his prostate cancer myeloma.  In November this year he was found to have a PSA of 73. CT imaging showed a small sclerotic lesion at L3.  Apparently this L3 lesion was biopsied and showed both a clonal plasma cell population and metastatic prostate cancer.  He had a PET scan on November 21, 2017 that showed adenopathy in the left periaortic area as well as the retroperitoneum in the left pelvis.  There was a sclerotic lesion in L3. No other hypermetabolic lesions were noted in the skeleton.  Bone scan performed on November 24 showed increased uptake in the posterior right ninth rib corresponding to hypertrophic area of bony cartilage, likely fracture.  This area was biopsied and was negative for malignancy.  Serum studie showed an IgA kappa MGUS (0.4 g/dL on serum protein electrophoresis).  Less than 5% plasma cells on bone marrow pathology.  Cytogenetics were normal.  He was started on Lupron and Casodex.  Lupron is being continued every 3 months.  He received radiation to the L3 vertebral body.  He also was started on weekly Velcade and  dexamethasone.  He now presents to [facility] care after incarceration.  He does note that he has some low back pain down his right leg.  He is not having any problems with numbness or tingling the hands and feet.  No rash or nausea.    Past History:    Past Medical History:   Diagnosis Date     Hypertension      Metastatic cancer      Multiple myeloma      Plasmacytoma      Prostate cancer     Family History   Problem Relation Age of Onset     Cancer Mother      Cancer Sister       [unfilled] Social History     Social History     Marital status: Patient Declined     Spouse name: N/A     Number of children: N/A     Years of education: N/A     Occupational History     Not on file.     Social History Main Topics     Smoking status: Former Smoker     Smokeless tobacco: Not on file     Alcohol use No     Drug use: No     Sexual activity: No     Other Topics Concern     Not on file     Social History Narrative     No narrative on file        Allergies:    Allergies   Allergen Reactions     Coconut Hives     Other Environmental Allergy      Laundry detergent, soap     Adhesive Tape-Silicones Rash     Review of Systems:    General  General (WDL): Exceptions to WDL  Fatigue: Yes - Chronic (Greater than 3 months) (insomnia)  Generalized Muscle Weakness: Yes - Chronic (Greater than 3 months)  ENT  ENT (WDL): Exceptions to WDL  Glasses or Contacts: Yes - Chronic (Greater than 3 months) (reading glasses)  Tinnitus: Yes - Chronic (Greater than 3 months) (right ear)  Hoarseness: Yes - Recent (Less than 3 months) (w/recent lung biopsy)  Sore Throat: Yes - Recent (Less than 3 months) (w/recent lung biopsy)  Dental Problems: Yes - Recent (Less than 3 months)  Respiratory  Respiratory (WDL): Exceptions to WDL  Dyspnea: Yes - Recent (Less than 3 months) (w/activity)  Cough: Yes - Recent (Less than 3 months) (dry)  Cardiovascular  Cardiovascular (WDL): All cardiovascular elements are within defined limits  Endocrine  Endocrine (WDL):  Exceptions to WDL  Excessive Thirst: Yes - Chronic (Greater than 3 months)  Hotflashes: Yes -Recent (Less than 3 months) (w/lupron injections)  Gastrointestinal  Gastrointestinal (WDL): Exceptions to WDL  Heartburn: Yes - Recent (Less than 3 months) (came with injections/treatment)  Nausea and Vomiting: Yes - Recent (Less than 3 months) (w/treatment)  Musculoskeletal  Musculoskeletal (WDL): Exceptions to WDL  Range of Motion Limitation: Yes - Chronic (Greater than 3 months) (neck)  Joint pain: Yes - Chronic (Greater than 3 months) (right ankle and hip, neck)  Back Pain: Yes - Chronic (Greater than 3 months) (low back down right leg)  Difficulty to lie flat for more than 30 minutes: Yes - Chronic (Greater than 3 months) (due to back pains)  Muscle pain or stiffness: Yes - Chronic (Greater than 3 months) (neck, back)  Neurological  Neurological (WDL): Exceptions to WDL  Headaches: Yes - Chronic (Greater than 3 months) (d/t neck pain and stiffness)  Difficulty with memory: Yes - Recent (Less than 3 months)  Dominant Hand: Right  Numbness and/or tingling: Yes - Recent (Less than 3 months) (right leg)  Psychological/Emotional  Psychological/Emotional (WDL): Exceptions to WDL  Depression: Yes - Chronic (Greater than 3 months) (during lose of family members)  Insomnia: Yes - Chronic (Greater than 3 months)  Anxiety: Yes - Chronic (Greater than 3 months)  Hematological/Lymphatic  Hematological/Lymphatic (WDL): Exceptions to WDL  Swollen glands: Yes - Recent (Less than 3 months)  Dermatological  Dermatologic (WDL): All dermatological elements are within defined limits  Genitourinary/Reproductive  Genitourinary/Reproductive (WDL): Exceptions to WDL  Painful urination: Yes - Recent (Less than 3 months) (now resolved)  Sensation of incomplete emptying of bladder: Yes - Recent (Less than 3 months)  Reproductive (Females only)     Pain  Currently in Pain: Yes  Pain Score (Initial OR Reassessment): 3  Pain Frequency:  Constant/continuous  Location: low back, neck, right leg  Pain Characteristics : Throbbing;Numbness  Pain Intervention(s): Home medication  Response to Interventions: some    Physical Exam:    Recent Vitals 5/2/2018   Weight 205 lbs 10 oz   /95   Pulse 67   Temp 98.1   Temp src 1   SpO2 99     General: patient appears stated age of 57 y.o.. Nontoxic and in no distress.   HEENT: Head: atraumatic, normocephalic. Sclerae anicteric.  Chest:  Normal respiratory effort  Cardiac:  No edema.   Abdomen: abdomen is soft, non-distended  Extremities: normal tone and muscle bulk.  Skin: no lesions or rash. Warm and dry.   CNS: alert and oriented. Grossly non-focal.   Psychiatric: normal mood and affect.     Lab Results:    Multiple lab studies were reviewed from his previous provider.    November, 2017:  Lantana free light chain equals 2.10.  M protein 0.4 g/dL  Immunofixation shows monoclonal IgA kappa.    January 2018  M protein 0.2 g/dL  PSA 8.74 kappa lambda ratio 1.33    March 2018:  PSA 2.89  Kappa lambda ratio 1.57    April 2018:  PSA 2.93  M protein 0.3 g/dL  Kappa free light chain 1.85.  Kappa  lambda ratio 2.09    Imaging Results:    I reviewed multiple imaging reports.  We do not have any images to review at this time.    1.  CT scan October 10, 2017: Indeterminant sclerotic L3 lesion, retroperitoneal lymphadenopathy.    2.  Bone survey dated November 10, 2017 showed no lytic or blastic bone lesions.    3.  PET/CT November 21, 2017: 67 mm sclerotic lesion on the left aspect of the elbow 3 vertebral body.  No lytic appearance.  Para-aortic and left pelvic lymphadenopathy.  Mildly prominent left lower neck adenopathy.  Indeterminate FDG uptake with SUV 2.7 involving healing fracture of posterior ninth rib.    4.  Bone scan dated November 24, 2017: Focal area of uptake posterior ninth rib.  Small focus in the posterior left skull.  No spinal uptake noted.      5.  CT chest abdomen pelvis from January 18, 2018: 3 mm  sclerotic focus L3 vertebral body.  1.6 cm lymph node to the left of the aorta below the level of the renal arteries.  5.  CT scan abdomen and pelvis dated 4/12/2018: No change small sclerotic lesions involving L3 or L1.    6.  MRI lumbar spine dated 3/29/2018: Moderately severe right neural foraminal stenosis at L5-S1.  Diffusely increased T2 hyperintensity within the L3 vertebral body.  The differential includes plasmacytoma.    7.  PET scan dated February 26, 2018: When compared to November 21, 2017 there is been interval development of multiple hypermetabolic lymph nodes throughout the mediastinum and bilateral hilar region.  The lungs show abnormal patchy groundglass ill-defined nodules.  These have variable uptake.  In the neck there are small bilateral posterior lateral hypermetabolic lymph nodes.  Subcentimeter in size.  Focus of activity in the medial parotid gland with SUV of 4.4.  Nasopharyngeal region demonstrates some symmetric increased uptake of radiotracer with SUV of 7.7.  Increased from previous study.  Improvement in hypermetabolism and lymph node in the retroperitoneal and lymph no change.  No significant hypermetabolic skeletal lesion identified.    Pathology:    1.  Right rib biopsy: 12/7/2017.  Negative for malignancy.    2.  Bone marrow biopsy from November 1, 2017: Monoclonal kappa plasma cell proliferation and less than 5% of the marrow space.  Cytogenetics normal.    3.  L3 vertebral body biopsy dated November 1, 2017: Alexis light chain restricted plasma cell infiltrate as well as metastatic prostate adenocarcinoma.    4.  4R lower peritracheal lymph node: April, 2017.  Aspirate from endobronchial ultrasound is negative for malignancy.      Signed by: Cosmo Carl MD

## 2021-06-17 NOTE — PROGRESS NOTES
Patient here ambulatory accompanied by nuha for consult for his prostate cancer, plasmacytoma, and multiple myeloma.  Patient has ongoing low back pain that radiates down his right leg as well as neck pain and stiffness.  See new patient intake for further nurse assessment.  Seen by Dr. Carl.

## 2021-06-17 NOTE — CONSULTS
Consults   [facility] Radiation Oncology Consult Note     Patient: Amauri Shell  MRN: [id number]  Date of Service: 05/02/2018          Benito Nogueira MD  [address] [place] [address]       Dear Dr. oNgueira:    Thank you very much for referring this patient for consideration of radiotherapy. As you know Mr. Shell is a 57 y.o. male with a diagnosis of stage IV prostate cancer with metastasis to retroperitoneal and the pelvic lymph nodes and L3 spine.  Patient also has a concurrent L3 plasmacytoma versus extra medullary multiple myeloma.  Patient is status post Lupron therapy and stereotactic radiation therapy to the L3 spine as well as systemic therapy with Velcade/dexamethasone started since the end of 2017.  Patient is here for routine postradiation therapy office follow-up.    HISTORY OF PRESENT ILLNESS:   Mr. Shell is a 57 y.o. male who is currently held at the [facility] and is referred to radiation oncology for evaluation and consideration of future management of his cancer diagnosis.  the patient is a current smoker who presented with persistent elevation of WBC as well as new right posterior chest wall pain in September 2017 for which he was seeking further evaluation.  Workup including CT abdomen pelvis showed 6-7 mm sclerotic lesion in the L3 vertebral body worrisome for malignancy.  The CT scan also reviewed abnormally enlarged retroperitoneal and the pelvic adenopathy.  Biopsy of the L3 spine showed plasma cell neoplasm as well as synchronous metastatic prostate adenocarcinoma.  His initial PSA was measured 73.  Additional evaluation revealed normal hemoglobin, creatinine, and calcium, M spike of 0.4 g/dL, monoclonal IgA kappa population, bone marrow involvement of less than 5% by a monoclonal kappa plasma cell proliferation, and the kappa free light chain of 2.10 mg/dL.  Additional workup including x-ray and PET scan revealed right posterior ninth rib fracture as well as  para-aortic/pelvic lymph nodes adenopathy. CT-guided biopsy of the right ninth rib lesion revealed benign connective tissue.  Patient then diagnosed as stage IV prostate cancer with metastatic lesion involving L3 spine and concurrent L3 spine plasmacytoma versus extra medullary multiple myeloma.  He initiated anti-hormonal therapy with Casodex and Lupron on November 24, 2017 for prostate cancer and his last Lupron injection was in April 2018.  Patient also received SBRT treatment with 2000 cGy in 1 fractions targeted to the L3 spine region on January 4, 2018 given by [facility] radiation oncology at [place], [place].  He so far has been responds well with significant reduction of his PSA.  His back pain has also been stable since the radiation therapy.  Patient also received systemic treatment with Velcade/dexamethasone for his plasmacytoma since December 2017.  Patient is not transfer to the [facility] and is now referred to radiation oncology for evaluation and consideration of future management options for his prostate cancer.  He has also been seen and evaluated by Dr. Cosmo Carl few days ago and Dr. Carl is planning to continue the management and future care for his prostate cancer and plasmacytoma.    CHEMOTHERAPY HISTORY: Concurrent Chemotherapy: No    RADIATION THERAPY HISTORY: Prior Radiation: Yes    Current Outpatient Prescriptions   Medication Sig Dispense Refill     acyclovir (ZOVIRAX) 400 MG tablet Take 400 mg by mouth 2 (two) times a day.       aspirin 81 MG EC tablet Take 81 mg by mouth daily.       atorvastatin (LIPITOR) 20 MG tablet Take 20 mg by mouth at bedtime.       cyclobenzaprine (FLEXERIL) 10 MG tablet Take 10 mg by mouth at bedtime.       ibuprofen (ADVIL,MOTRIN) 200 MG tablet Take 400 mg by mouth every 8 (eight) hours as needed for pain.       lisinopril (PRINIVIL,ZESTRIL) 40 MG tablet Take 40 mg by mouth daily.       multivitamin therapeutic (THERA) tablet Take 1 tablet by mouth  daily.       naproxen (NAPROSYN) 500 MG tablet Take 500 mg by mouth 2 (two) times a day as needed.       ondansetron (ZOFRAN) 4 MG tablet Take 4 mg by mouth every 8 (eight) hours as needed for nausea.       No current facility-administered medications for this visit.      Past Medical History:   Diagnosis Date     Hypertension      Metastatic cancer      Multiple myeloma      Plasmacytoma      Prostate cancer      Past Surgical History:   Procedure Laterality Date     HERNIA REPAIR       Coconut; Other environmental allergy; and Adhesive tape-silicones  Family History   Problem Relation Age of Onset     Cancer Mother      Cancer Sister      Social History     Social History     Marital status: Patient Declined     Spouse name: N/A     Number of children: N/A     Years of education: N/A     Occupational History     Not on file.     Social History Main Topics     Smoking status: Former Smoker     Smokeless tobacco: Not on file     Alcohol use No     Drug use: No     Sexual activity: No     Other Topics Concern     Not on file     Social History Narrative     No narrative on file        Review of Systems:      General  Constitutional (WDL): Exceptions to WDL  Fatigue: Fatigue not relieved by rest - Limiting instrumental ADL  EENT  Eye Disorder (WDL): All eye disorder elements are within defined limits  Ear Disorder (WDL): Exceptions to WDL  Tinnitus: Mild symptoms, intervention not indicated (right ear, chronic)  Respiratory   Respiratory (WDL): Exceptions to WDL  Cough: Mild symptoms, nonprescription intervention indicated (dry)  Cardiovascular  Cardiovascular (WDL): All cardiovascular elements are within defined limits  Endocrine     Gastrointestinal  Gastrointestinal (WDL): All gastrointestinal elements are within defined limits  Musculoskeletal  Musculoskeletal and Connetive Tissue Disorders (WDL): Exceptions to WDL  Arthralgia: Mild pain (neck)  Integumentary               Integumentary (WDL): All integumentary  elements are within defined limits  Neurological  Neurosensory (WDL): Exceptions to WDL  Peripheral Sensory Neuropathy: Asymptomatic, loss of deep tendon reflexes or paresthesia (right foot)  Psychological/Emotional   Patient Coping: Accepting  Hematological/Lymphatic  Lymph (WDL): All lymph disorder elements are within defined limits  Dermatologic     Genitourinary/Reproductive  Genitourinary (WDL): All genitourinary elements are within defined limits  Reproductive     Pain              Currently in Pain: Yes  Pain Score (Initial OR Reassessment): 4  Pain Frequency: Constant/continuous  Location: low back, neck (standing is worse)  Pain Intervention(s): Medication (See MAR)  Response to Interventions: helpful  Accompanied by  Accompanied by: Law Enforcment    Imaging: Reviewed    Pathology: Reviewed    ECOG Peformance Status  ECOG Performance Status: 2       Objective:     PHYSICAL EXAMINATION:    BP (!) 158/95  Pulse 67  Temp 98.1  F (36.7  C) (Oral)   Wt 205 lb 9.6 oz (93.3 kg)  SpO2 99%    Gen: Alert, in NAD  The rest of examination is not performed today.  The patient is only coming here for evaluation and discussion of future treatment options for his cancer.     Impression     Stage IV prostate cancer with metastasis to retroperitoneal and the pelvic lymph nodes and L3 spine.  Patient also has a concurrent L3 plasmacytoma versus extra medullary multiple myeloma.  Patient is status post Lupron therapy and stereotactic radiation therapy to the L3 spine as well as systemic therapy with Velcade/dexamethasone started since the end of 2017.     Assessment & Plan:     I have personally reviewed his upcoming medical record today.  I have also discussed with the patient about all the possible treatment options for prostate cancer and plasmacytoma including surgery, systemic therapy, and radiation therapy.  Patient already started on hormonal therapy as well as systemic therapy for both prostate cancer and  plasmacytoma.  He so far has been doing well with no clinical evidence of progression of disease.  Patient therefore will be continue his follow-up and future care with Dr. Carl, medical oncology for both prostate cancer and plasmacytoma.  I have informed the patient that he is in a good hand for cancer care and therefore will be followed in our clinic as needed.    Again, thank you very much for the referral and allowing me to participate in the care of this patient.  If you have any questions or concerns about this consultation, please do not hesitate to call.  I spent approximately 45 minutes today with the patient and 50% time was used for counseling.    Sincerely,        Selma Lua MD, PhD  Department of Radiation Oncology   [facility]  Tel: [phone]  Page: [phone]    [facility]  [address] [place] [address]     [address]   [place] [address]    CC:  Patient Care Team:  Benito Nogueira MD as PCP - General (Family Medicine)  Cosmo Carl MD as Physician (Hematology and Oncology)  Dyana Snow RN as Oncology Nurse Navigator  Selma Lua MD as Physician (Radiation Oncology)

## 2021-06-17 NOTE — PROGRESS NOTES
Pt in w/c from DOC and guards to radiation clinic for initial consult. BP slightly high, just started on BP med, has continuous pain in back/neck. Had radiation a couple months ago, no area to treat at present. Talked to Dr. Carl and medical oncology will follow the pt, informed Dr. Lua. Further recommendations and orders per provider.

## 2021-06-17 NOTE — PROGRESS NOTES
[facility] Hematology and Oncology Progress Note    Patient: Amauri Shell  MRN: [id number]  Date of Service: 05/11/2018        Reason for Visit    Chief Complaint   Patient presents with     HE Cancer     Malignant neoplasm of prostate       Assessment and Plan    1.  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  Did receive radiation to this bone lesion.  He also started on systemic therapy.  Unclear if he actually truly has myeloma or MGUS.  We will continue his weekly Velcade.  Dr. Carl wants him to have that for about 6-8 months.  He started in December.  He has not gotten it for a month since he was transferred to [facility].  We will restart today.  He will get that weekly with weekly 20 mg of dexamethasone.  We will likely finish the Velcade around July.  We will monitor his myeloma labs as well.    2.  Metastatic prostate cancer: This was made from a biopsy of a L3 bone lesion.  He also had adenopathy.  He is currently on Lupron.  Apparently his PSA has been responding.  We are going to give him his Lupron today as well as check a PSA.  Called  [facility] in [place] which is where he got his last Lupron and they stated it was in February so he is due today.  We will continue that every 12 weeks.  At some point we may add in Zytiga per the Latitude trial but we will hold off on that for now.    3. Thyroid nodule: This was found on PET scan and it was hypermetabolic.  We will order an ultrasound to specifically look at this.  If it looks worrisome we will biopsy    4.  Mediastinal lymphadenopathy that was hypermetabolic on PET: He did have a biopsy done in [place] that was negative.  We will repeat a PET scan to evaluate these lymph nodes.  Worrisome for cancer.      ECOG Performance   ECOG Performance Status: 1     Distress Assessment   Mild: Coming to new clinic    Pain  Currently in Pain: Yes  Pain Score (Initial OR Reassessment): 4  Location: Low back- not too bad today.       Problem  List    1. Plasmacytoma  CC OFFICE VISIT LONG    Injection Appointment    Injection Appointment    Injection Appointment    Injection Appointment    DISCONTINUED: bortezomib 1.3 mg/m2 chemo (VELCADE)    DISCONTINUED: diphenhydrAMINE injection 50 mg (BENADRYL)    DISCONTINUED: famotidine 20 mg/2 mL injection 20 mg (PEPCID)    DISCONTINUED: hydrocortisone sod succ (PF) 100 mg/2 mL injection 100 mg    DISCONTINUED: acetaminophen tablet 1,000 mg (TYLENOL)    DISCONTINUED: dexamethasone tablet 20 mg (DECADRON)   2. Malignant neoplasm of prostate  PSA, Diagnostic (Prostatic-Specific Antigen)   3. Thyroid nodule  US Thyroid   4. Mediastinal lymphadenopathy  NM PET CT Skull to Mid Thigh      ______________________________________________________________________________    History of Present Illness    Diagnosis:   1.  Metastatic prostate cancer: Metastatic disease to retroperitoneal pelvic lymph nodes.     2.  Solitary plasmacytoma of bone/myeloma: Involving L3.  Minimal bone marrow involvement. .   3.  Thyroid lesion: Likely goiter/thyroid nodule.  PET positive.      Treatment:  1. He was started on Lupron.  First dose November 24, 2017.    Last PSA was on April 12 and it was 2.9.  2.  Started on treatment with Velcade and dexamethasone on 12/6/2017.  Weekly.    He has not had any since April due to being [facility] to [facility].  He had radiation to the L3 metastases, I believe SBRT 20 gray ×1 fraction.  3. Observation. He had been followed every 6 months or so by ENT    Interim history:  Is here today in follow-up and to start his Velcade injections now that he is settled in at the DOC.  He states that he is doing okay.  He does have back pain that is intermittent.  He has noticed that he is having a little bit issue with swallowing where he feels like things are coming getting stuck.  He says sometimes if he just takes this time or presses on his throat it opens up and then he has no issue.  Denies any throat pain.   Denies any fevers or infectious complaints.    Pain Status  Currently in Pain: Yes    Review of Systems    Constitutional  Constitutional (WDL): Exceptions to WDL  Fatigue: Fatigue not relieved by rest - Limiting instrumental ADL  Neurosensory  Neurosensory (WDL): All neurosensory elements are within defined limits  Peripheral Sensory Neuropathy: Asymptomatic, loss of deep tendon reflexes or paresthesia (Rt leg/foot. Comes and goes. )  Eye   Eye Disorder (WDL): All eye disorder elements are within defined limits  Ear  Tinnitus: Mild symptoms, intervention not indicated (Rt ear; chronic. )  Cardiovascular  Cardiovascular (WDL): All cardiovascular elements are within defined limits  Pulmonary  Respiratory (WDL): Within Defined Limits  Gastrointestinal  Gastrointestinal (WDL): Exceptions to WDL  Dysphagia: Symptomatic, able to eat regular diet (Occassionally to Lt side of neck. )  Genitourinary  Genitourinary (WDL): All genitourinary elements are within defined limits  Lymphatic  Lymph (WDL): All lymph disorder elements are within defined limits  Musculoskeletal and Connective Tissue  Musculoskeletal and Connetive Tissue Disorders (WDL): Exceptions to WDL  Arthralgia: Mild pain (Neck.)  Integumentary  Integumentary (WDL): All integumentary elements are within defined limits  Patient Coping  Patient Coping: Accepting  Distress Assessment     Accompanied by  Accompanied by: Law Enforcment  Oral Chemo Adherence       Past History  Past Medical History:   Diagnosis Date     Hypertension      Metastatic cancer      Multiple myeloma      Plasmacytoma      Prostate cancer        PHYSICAL EXAM:  BP (!) 155/102  Pulse 82  Temp 97.8  F (36.6  C) (Oral)   SpO2 95%  GENERAL: no acute distress. Cooperative in conversation. Here with 2 guards from DOC  HEENT: pupils are equal, round and reactive. Oromucosa is clean and intact. No ulcerations or mucositis noted. No bleeding noted.  RESP: lungs are clear bilaterally per  auscultation. Regular respiratory rate. No wheezes or rhonchi.  CV: Regular, rate and rhythm. No murmurs.  ABD: soft, nontender. Positive bowel sounds. No organomegaly.   MUSCULOSKELETAL: No lower extremity swelling.   NEURO: non focal. Alert and oriented x3.   PSYCH: within normal limits. No depression or anxiety.  SKIN: warm dry intact   LYMPH: no cervical, supraclavicular lymphadenopathy    Lab Results    Recent Results (from the past 168 hour(s))   Comprehensive Metabolic Panel   Result Value Ref Range    Sodium 143 136 - 145 mmol/L    Potassium 3.9 3.5 - 5.0 mmol/L    Chloride 106 98 - 107 mmol/L    CO2 27 22 - 31 mmol/L    Anion Gap, Calculation 10 5 - 18 mmol/L    Glucose 92 70 - 125 mg/dL    BUN 17 8 - 22 mg/dL    Creatinine 0.73 0.70 - 1.30 mg/dL    GFR MDRD Af Amer >60 >60 mL/min/1.73m2    GFR MDRD Non Af Amer >60 >60 mL/min/1.73m2    Bilirubin, Total 0.4 0.0 - 1.0 mg/dL    Calcium 9.8 8.5 - 10.5 mg/dL    Protein, Total 6.8 6.0 - 8.0 g/dL    Albumin 3.7 3.5 - 5.0 g/dL    Alkaline Phosphatase 39 (L) 45 - 120 U/L    AST 18 0 - 40 U/L    ALT 38 0 - 45 U/L   HM1 (CBC with Diff)   Result Value Ref Range    WBC 11.7 (H) 4.0 - 11.0 thou/uL    RBC 3.98 (L) 4.40 - 6.20 mill/uL    Hemoglobin 13.6 (L) 14.0 - 18.0 g/dL    Hematocrit 39.0 (L) 40.0 - 54.0 %    MCV 98 80 - 100 fL    MCH 34.2 (H) 27.0 - 34.0 pg    MCHC 34.9 32.0 - 36.0 g/dL    RDW 11.9 11.0 - 14.5 %    Platelets 221 140 - 440 thou/uL    MPV 10.2 8.5 - 12.5 fL   Manual Differential   Result Value Ref Range    Total Neutrophils % 74 (H) 50 - 70 %    Lymphocytes % 17 (L) 20 - 40 %    Monocytes % 9 2 - 10 %    Eosinophils %  0 0 - 6 %    Basophils % 0 0 - 2 %    Total Neutrophils Absolute 8.7 (H) 2.0 - 7.7 thou/ul    Lymphocytes Absolute 2.0 0.8 - 4.4 thou/uL    Monocytes Absolute 1.1 (H) 0.0 - 0.9 thou/uL    Eosinophils Absolute 0.0 0.0 - 0.4 thou/uL    Basophils Absolute 0.0 0.0 - 0.2 thou/uL    Platelet Estimate Normal Normal    Ovalocytes 1+ (!) Negative    PSA is pending    Imaging    No results found.      Signed by: Aline Roth, CNP

## 2021-06-17 NOTE — PROGRESS NOTES
PT here for first velcade inj. PT is with 2 guards. Reviewed velcade with pt. Decadron po given and velcade inj given in with bandaid to site. Eligard injection also given in with bandaid to site.PT tolerated txt without any problems. Follow up written in grey folder for guards. PT dc'd in wheelchair with two guards.

## 2021-06-18 NOTE — PROGRESS NOTES
Pt came to chemo infusion this morning for lab and his next dose of velcade.  VSS.  Pt assessed.  Peripheral lab draw done and results noted.  He received his velcade into his abdomen sq and tolerated the injection well.  Site was covered with a bandaid.  Pt d/c from clinic ambulatory accompanied by DOC guards. Summary of care sent with guards for DOC staff.

## 2021-06-18 NOTE — PROGRESS NOTES
Amauri came to chemo infusion accompanied by nuha after MD visit. Per Dr Carl we need labs but not to wait for results to give velcade.  Labs were drawn.  Velcade given sq into his abdomen and he tolerated the injection well.  Amauri d/c via wheelchair accompanied by Edward breen.

## 2021-06-18 NOTE — PROGRESS NOTES
Pt arrived via wheelchair to clinic for Cycle # 1 Day # 22 of his chemotherapy regimen.  Labs reviewed and pt is ok for treatment.  Administered SQ Velcade into LUQ of ABD per MD order.  Pt tolerated procedure well, no s/s of bleeding or swelling at site.  Pt left with guards via wheelchair.

## 2021-06-18 NOTE — PROGRESS NOTES
Patient is here today for a follow-up provider visit as well as an injection for his prostate cancer.

## 2021-06-18 NOTE — PROGRESS NOTES
"[facility] Hematology and Oncology Progress Note    Patient: Amauri Shell  MRN: [id number]  Date of Service: 06/15/2018      Assessment and Plan:    1.    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  6 months of treatment will end of this month.  We will check his labs today to see where he is at.  If he has a good response will probably hold treatment as the metastatic prostate cancer carries a worse prognosis.  If we do have treatments we can reinitiate upon relapse.  Overall, I think he had a low disease burden of myeloma.       2.  Metastatic prostate cancer: Responding biochemically and radiographically.  Remains on Lupron.  Should probably add abiraterone in the near future based on the LATITUDE trial.  Next dose of Lupron will be in August.     3.  Thyroid nodule: He just had an ultrasound.  The report was reviewed.  No nodules.  Consistent with diffuse thyroiditis.  We will continue to follow with thyroid studies.     4.  PET positive mediastinal adenopathy: Recent PET CT was reviewed.  Previous hypermetabolic areas have resolved.  No further workup is needed.      5.  Significant lumbar stenosis at L5-S1: A referral was placed to the [facility] spine clinic.    ECOG Performance   ECOG Performance Status: 1    Distress Assessment  Distress Assessment Score: 3 (\"outside stressors\")    Pain  Currently in Pain: Yes  Pain Score (Initial OR Reassessment): 4  Location: low back off into right hip at times    Diagnosis:    1.  Plasma cell dyscrasia: Diagnostically, it has been somewhat of a challenge.  He does not appear to have any lytic lesions but does appear to have malignant plasma cells and a sclerotic lesion where there is also found metastatic prostate cancer.  His initial bone marrow biopsy report was reviewed by our pathologists count 10-15% malignant plasma cells. No \"CRAB\" findings.  He has not clearly fulfill the diagnostic criteria by the international myeloma " working group.  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    2.  Metastatic prostate cancer:  Metastatic disease to retroperitoneal and pelvic lymph nodes.  Also L3.  .     3.  Thyroid lesion: Likely goiter/thyroid nodule.  PET positive.  He had been followed every 6 months or so by ENT    Treatment:    1.  Smoldering myeloma: He started treatment with Velcade and dexamethasone on 2017.  Days 1, 8, 1521 day cycle.  He also had radiation to the L3 metastasis, I believe SBRT 20 ken ×1 fraction.    2.  Prostate cancer: Started on Lupron in 2017.    Interim History:    Amauri returns today for follow-up visit.  In general he notes that he has been doing okay.  Continues to have low back pain.  No sciatica type symptoms.  No numbness or tingling the hands or feet.  He has occasional nausea.  He also reports fatigue and hot flashes.  He has hot flashes about 4 times a day which last about 10 minutes.  Occasional night sweats.  He takes Zofran for nausea which is effective.    Review of Systems:    Constitutional  Constitutional (WDL): Exceptions to WDL  Fatigue: Fatigue not relieved by rest - Limiting instrumental ADL  Neurosensory  Neurosensory (WDL): Exceptions to WDL  Ataxia: Asymptomatic, clinical or diagnostic observations only, intervention not indicated (legs give out)  Peripheral Sensory Neuropathy: Asymptomatic, loss of deep tendon reflexes or paresthesia  Cardiovascular  Cardiovascular (WDL): All cardiovascular elements are within defined limits  Pulmonary  Respiratory (WDL): Exceptions to WDL  Dyspnea: Shortness of breath with moderate exertion (at times - not often)  Gastrointestinal  Gastrointestinal (WDL): Exceptions to WDL  Nausea: Loss of appetite without alteration in eating habits (at times relief with zofran - ODT tab leaves bad taste in mouth)  Vomitin - 2 episodes ( by 5 minutes) in 24 hrs (due to bad taste in  "mouth)  Dysgeusia: Altered taste but no change in diet (for 3 days post tx; tastes like copper)  Dry Mouth: Symptomatic (e.g., dry or thick saliva) without significant dietary alteration, unstimulated saliva flow >0.2 ml/min  Genitourinary  Genitourinary (WDL): All genitourinary elements are within defined limits  Integumentary  Integumentary (WDL): Exceptions to WDL (nail changes; thin and ridges)  Patient Coping  Patient Coping: Accepting  Accompanied by  Accompanied by: Law Enforcment    Past History:    Past Medical History:   Diagnosis Date     Hypertension      Metastatic cancer (H)      Multiple myeloma (H)      Plasmacytoma (H)      Prostate cancer (H)      Physical Exam:    Recent Vitals 6/15/2018   Height 5' 10\"   Weight 215 lbs   BSA (m2) 2.19 m2   /93   Pulse 79   Temp 98.1   Temp src 1   SpO2 94   Some recent data might be hidden     General: patient appears stated age of 57 y.o.. Nontoxic and in no distress.   HEENT: Head: atraumatic, normocephalic. Sclerae anicteric.  Chest:  Normal respiratory effort  Cardiac:  No edema.   Abdomen: abdomen is soft, non-distended  Extremities: normal tone and muscle bulk.  Skin: no lesions or rash. Warm and dry.   CNS: alert and oriented. Grossly non-focal.   Psychiatric: normal mood and affect.     Lab Results:    Recent Results (from the past 168 hour(s))   Comprehensive Metabolic Panel   Result Value Ref Range    Sodium 141 136 - 145 mmol/L    Potassium 3.9 3.5 - 5.0 mmol/L    Chloride 106 98 - 107 mmol/L    CO2 26 22 - 31 mmol/L    Anion Gap, Calculation 9 5 - 18 mmol/L    Glucose 100 70 - 125 mg/dL    BUN 18 8 - 22 mg/dL    Creatinine 0.73 0.70 - 1.30 mg/dL    GFR MDRD Af Amer >60 >60 mL/min/1.73m2    GFR MDRD Non Af Amer >60 >60 mL/min/1.73m2    Bilirubin, Total 0.6 0.0 - 1.0 mg/dL    Calcium 9.7 8.5 - 10.5 mg/dL    Protein, Total 7.1 6.0 - 8.0 g/dL    Albumin 3.9 3.5 - 5.0 g/dL    Alkaline Phosphatase 42 (L) 45 - 120 U/L    AST 21 0 - 40 U/L    ALT 39 0 - " 45 U/L   HM1 (CBC with Diff)   Result Value Ref Range    WBC 12.0 (H) 4.0 - 11.0 thou/uL    RBC 3.90 (L) 4.40 - 6.20 mill/uL    Hemoglobin 13.4 (L) 14.0 - 18.0 g/dL    Hematocrit 38.4 (L) 40.0 - 54.0 %    MCV 99 80 - 100 fL    MCH 34.4 (H) 27.0 - 34.0 pg    MCHC 34.9 32.0 - 36.0 g/dL    RDW 13.2 11.0 - 14.5 %    Platelets 320 140 - 440 thou/uL    MPV 9.8 8.5 - 12.5 fL    Neutrophils % 90 (H) 50 - 70 %    Lymphocytes % 6 (L) 20 - 40 %    Monocytes % 3 2 - 10 %    Eosinophils % 1 0 - 6 %    Basophils % 0 0 - 2 %    Neutrophils Absolute 10.8 (H) 2.0 - 7.7 thou/uL    Lymphocytes Absolute 0.8 0.8 - 4.4 thou/uL    Monocytes Absolute 0.3 0.0 - 0.9 thou/uL    Eosinophils Absolute 0.1 0.0 - 0.4 thou/uL    Basophils Absolute 0.0 0.0 - 0.2 thou/uL      Imaging:    PET scan images were reviewed.  No evidence of active disease.    Nm Pet Ct Whole Body    Result Date: 5/30/2018  [facility] PET FDG/CT 5/30/2018 9:21 AM INDICATION: Multiple myeloma, restaging. Prior radiation to L3 lesion. Medistinal lymphadenopahthy. Prostate cancer. Subsequent treatment strategy. TECHNIQUE: Serum glucose level 90 mg/dL. One hour post intravenous administration of 10.0 mCi F-18 FDG, PET imaging was performed from the vertex to the toes utilizing attenuation correction with concurrent axial CT and PET/CT image fusion. Dose reduction techniques were used. COMPARISON: PET/CT 02/26/2018. PET/CT 11/21/2017, CT abdomen pelvis 04/12/2018 reviewed. FINDINGS: No FDG avid adenopathy, including mediastinum or neck. Existing FDG avid cervical, mediastinal and bilateral hilar adenopathy has resolved. Irregular curvilinear opacities in both lungs depicted previously have also resolved. Moderate diffuse uptake in the thyroid suggesting autoimmune thyroiditis. Moderate uptake in the posterior nasopharynx adenoid region has decreased, likely inflammatory. No suspicious focal skeletal uptake. Sclerotic lesion in L3 vertebral body, suggesting site of prior  treated disease, is not FDG avid. Lungs are clear. Groundglass opacities and nodular densities on PET/CT 02/26/2018 have resolved. Moderate size esophageal hiatal hernia. Mild atherosclerotic calcifications. Small fat-containing left inguinal hernia. Cutaneous/cutaneous lesion in the posterior left upper leg demonstrates decreased minimal uptake suggesting improved inflammatory lesion.     CONCLUSION: 1. No FDG avid adenopathy, including in the mediastinum. Existing FDG avid adenopathy and bilateral pulmonary opacities have resolved, which suggest prior findings represented an infectious/inflammatory process. 2. No FDG avid skeletal lesion. 3. Stable non-FDG avid sclerotic L3 lesion.      Thyroid ultrasound report from May 16, 2018 was reviewed.  This showed hypervascular thyroid goiter without focal nodule.  Differential considerations include Graves' disease or inflammatory thyroiditis in the setting of hyperthyroidism.  No nodules in the thyroid.  No lymphadenopathy.      Signed by: Comso Carl MD

## 2021-06-18 NOTE — PROGRESS NOTES
Amauri came to clinic this morning accompanied by DOC guards for his next dose of velcade (C2D8).  VSS.  Pt assessed.  Velcade was given sq into his abdomen.  He tolerated the injection well and site was covered with a bandaid.  Amauri d/c from clinic via wheelchair accompanied by guards.  Summary of care sent to DOC.

## 2021-06-19 NOTE — PROGRESS NOTES
Pt came into infusion clinic for Velcade as ordered. Pt tolerated injection well. Pt states he is being given Dexamethasone on Fridays at the [facility]. Clarification was written and sent to the DOC that Dex should be given once a week on the day of his chemo treatment. Pt left infusion clinic via ambulatory and will RTC as sched.

## 2021-06-19 NOTE — PROGRESS NOTES
Pt here for velcade injection after seeing NP.  No problems with injection in abdomen.  bandaid applied and pt d/c in wheel chair to lobby with DOC guards

## 2021-06-19 NOTE — PROGRESS NOTES
Pt arrives to infusion center accompanied by DOC guards for treatment.  Pt c/o bruising and discomfort at previous injection site, but has no other new concerns today.  Velcade given SQ as ordered without difficulty.  Pt left clinic stable to lobby.  Plan RTC as scheduled.

## 2021-06-19 NOTE — PROGRESS NOTES
Pt here for velcade and eligard injections.  Both given without incident. Pt d/c in wheel chair to lobby with DOC guards

## 2021-06-19 NOTE — PROGRESS NOTES
Amauri came to clinic this morning accompanied by HERMAN breen for his next dose of velcade (C3D15).  VSS.  Pt assessed. He complains of hand cramping x2 episodes the past week and this was discussed with Aline Roth CNP.  Magnesium added to labs to be drawn next week.  Pt educated on this.  Velcade was given sq into his abdomen.  He tolerated the injection well and site was covered with a bandaid.  Amauri d/c from clinic via wheelchair accompanied by nuha.  Summary of care sent to HERMAN.

## 2021-06-19 NOTE — LETTER
Letter by Marcela Mari MD at      Author: Marcela Mari MD Service: -- Author Type: --    Filed:  Encounter Date: 11/19/2019 Status: Signed         Benito Nogueira MD  [address] [place] [address]                                  November 19, 2019    Patient: Amauri Shell   MR Number: [id number]   YOB: 1960   Date of Visit: 11/19/2019     Dear Dr. Mirlande MD:    Thank you for referring Amauri Shell to me for evaluation. Below are the relevant portions of my assessment and plan of care.    If you have questions, please do not hesitate to call me. I look forward to following Amauri along with you.    Sincerely,        Marcela Mari MD          CC  No Recipients  Marcela Mari MD  11/19/2019 10:55 AM  Signed    Pulmonary Clinic Visit    Cc: recurrent pneumonias    HPI: 59 y.o. male with history of multiple myeloma and metastatic prostate cancer who presents from [facility].    He has had pneumonia multiple times in 2019. Feb, April, June August x2, September x1, November 15.  Hospitalized most recently 9/2019 for severe RLL pneumonia, septic shock on pressors and Bipap. Also question of PE (contrast was not timed right), so started on anticoagulation.    He was then improved and discharged but continued to have complaints of cough with green sputum and had 2 xrays to evaluation this 10/14 and 10/16 both read as clear.    Then started Coughing up green phlegm, shortness of breath. CXR 11/15 found RLL infiltrate that wasn't there 10/2019 (I can't see the images as they were at the [facility])    Levaquin just completed 3-4 days ago.  Coughing has improved, doesn't feel like he is coughing up the bad stuff any longer. Did have hemoptysis at one time but this has now resolved, he thinks no hemoptysis for a month.     +Night sweats which have gotten better but last 4-6 hours, got so bad he required IV fluid for dehydration.     Recently changed from Zytiga to Xtandi for the prostate  cancer.      1200 after hospital, now just shy of 3000 (prior to all of this was 4000)          Past Medical History:   Diagnosis Date   ? GERD (gastroesophageal reflux disease)    ? Hiatal hernia    ? Hypertension    ? Metastatic cancer (H)    ? Multiple myeloma (H)    ? Plasmacytoma (H)    ? Prostate cancer (H)          Social History     Tobacco Use   ? Smoking status: Former Smoker   ? Smokeless tobacco: Never Used   Substance Use Topics   ? Alcohol use: Yes     Comment: 1 x month         Family History   Problem Relation Age of Onset   ? Cancer Mother    ? Cancer Sister          Current Outpatient Medications   Medication Sig Note   ? acyclovir (ZOVIRAX) 400 MG tablet Take 400 mg by mouth 2 (two) times a day.    ? amLODIPine (NORVASC) 5 MG tablet Take 5 mg by mouth daily.    ? apixaban (ELIQUIS) 5 mg Tab tablet Take 2 tablets (10 mg total) by mouth 2 (two) times a day for 7 days, THEN 1 tablet (5 mg total) 2 (two) times a day.    ? atorvastatin (LIPITOR) 20 MG tablet Take 20 mg by mouth daily.           ? calcium carb/magnesium hydrox (MYLANTA ORAL) Take 30 mL by mouth 2 (two) times a day.    ? calcium polycarbophil (FIBERCON) 625 mg tablet Take 625 mg by mouth 2 (two) times a day.           ? capsaicin (ZOSTRIX) 0.025 % cream Apply 1 application topically 2 (two) times a day. 9/25/2019: Scheduled per MAR, however no administrations recorded -- self-administered med?   ? chlorhexidine (PERIDEX) 0.12 % solution Apply 10 mL to the mouth or throat 2 (two) times a day.           ? dexAMETHasone (DECADRON) 2 MG tablet Take 10 mg by mouth once a week Wednesdays (day of chemo).    ? docusate sodium (COLACE) 100 MG capsule Take 100 mg by mouth 2 (two) times a day.    ? enzalutamide (XTANDI) 40 mg cap Take 160 mg by mouth daily.    ? fluticasone propionate (FLONASE) 50 mcg/actuation nasal spray Apply 2 sprays into each nostril daily.    ? lanolin/mineral oil/petrolatum (ARTIFICIAL TEARS OPHT) Apply 1 drop to eye every 2  (two) hours as needed.    ? levothyroxine (SYNTHROID, LEVOTHROID) 100 MCG tablet Take 100 mcg by mouth daily.    ? lisinopril (PRINIVIL,ZESTRIL) 40 MG tablet Take 40 mg by mouth daily.    ? loratadine (CLARITIN) 10 mg tablet Take 10 mg by mouth daily.    ? LORazepam (ATIVAN) 0.5 MG tablet Take 0.5 mg by mouth 3 (three) times a day as needed for anxiety.    ? mag hydrox/aluminum hyd/simeth (MAALOX ORAL) Take 30 mL by mouth 2 (two) times a day as needed (heartburn).    ? metoclopramide (REGLAN) 5 MG tablet Take 5 mg by mouth 2 (two) times a day.           ? min oil-petrolat (AQUAPHOR) ointment Apply 1 application topically 2 (two) times a day. 9/25/2019: Scheduled per MAR; however, no administrations recorded since Sept 3   ? morphine (MS CONTIN) 30 MG 12 hr tablet Take 30 mg by mouth every 12 (twelve) hours.    ? multivitamin therapeutic tablet Take 1 tablet by mouth daily.    ? naproxen (NAPROSYN) 500 MG tablet Take 500 mg by mouth 2 (two) times a day with meals.    ? omeprazole (PRILOSEC) 20 MG capsule Take 20 mg by mouth daily before breakfast.    ? ondansetron (ZOFRAN) 8 MG tablet Take 8 mg by mouth every 8 (eight) hours as needed for nausea.           ? oxyCODONE (ROXICODONE) 5 MG immediate release tablet Take 5 mg by mouth 2 (two) times a day as needed for pain.    ? oxymetazoline HCl (NASAL SPRAY SINUS NASL) 2 sprays into each nostril 4 (four) times a day as needed.    ? polyethylene glycol 3350 (MIRALAX ORAL) Take 17 g by mouth daily.    ? polyvinyl alcohol (LIQUIFILM TEARS) 1.4 % ophthalmic solution Administer 1 drop to both eyes every 2 (two) hours as needed for dry eyes.    ? saliva stimulant (BIOTENE ORAL BALANCE) liquid Take 10 mL by mouth 4 (four) times a day as needed.           ? sodium chloride (OCEAN) 0.65 % nasal spray Apply 2 sprays into each nostril 4 (four) times a day as needed for congestion.    ? sucralfate (CARAFATE) 100 mg/mL suspension Take 1 g by mouth 4 (four) times a day.    ? traZODone  (DESYREL) 150 MG tablet Take 150 mg by mouth at bedtime.    ? venlafaxine (EFFEXOR-XR) 75 MG 24 hr capsule Take 75 mg by mouth daily.           ? abiraterone (ZYTIGA) 250 mg Tab Take 1,000 mg by mouth daily.           ? cyanocobalamin 1,000 mcg/mL injection Inject 1,000 mcg into the shoulder, thigh, or buttocks every 28 days. 4th Monday of each month          ? hydrOXYzine pamoate (VISTARIL) 25 MG capsule Take 1-2 capsules (25-50 mg total) by mouth every 6 (six) hours as needed for anxiety.        Allergies   Allergen Reactions   ? Coconut Hives   ? Other Environmental Allergy      Laundry detergent, soap   ? Adhesive Tape-Silicones Rash         Review of Systems   Constitutional: Positive for diaphoresis.   HENT: Negative.    Eyes: Negative.    Respiratory: Positive for cough and shortness of breath. Negative for chest tightness and wheezing.    Cardiovascular: Negative.    Gastrointestinal: Negative.    Endocrine: Negative.    Genitourinary: Negative.    Musculoskeletal: Positive for arthralgias and back pain.   Allergic/Immunologic: Negative.    Neurological: Negative.    Hematological: Negative.    Psychiatric/Behavioral: Negative.        Vitals:    11/19/19 0759   BP: 138/88   Pulse: 80   Resp: 24   SpO2: 91%   RA  Physical Exam   Constitutional: He is oriented to person, place, and time. He appears well-developed and well-nourished.   HENT:   Head: Normocephalic and atraumatic.   Forehead excision site with 2 sutures healing well. Mallampati II   Eyes: Conjunctivae and EOM are normal.   Neck: Normal range of motion. No tracheal deviation present.   Cardiovascular: Normal rate and regular rhythm.   Pulmonary/Chest: Effort normal and breath sounds normal.   Clear at both bases   Abdominal: Soft. There is no abdominal tenderness.   Musculoskeletal: Normal range of motion.         General: No edema.      Comments: No clubbing   Lymphadenopathy:     He has no cervical adenopathy.   Neurological: He is alert and  oriented to person, place, and time.   Skin: Skin is warm and dry.   Psychiatric: He has a normal mood and affect. His behavior is normal. Thought content normal.     CTA chest 9/2019: unable to view because NIL not opening image.  EXAM: CTA CHEST PE RUN  LOCATION: [facility]  DATE/TIME: 9/25/2019 8:57 PM     INDICATION: Shortness of breath, Dyspnea. History of myeloma and prostate cancer.  COMPARISON: None.  TECHNIQUE: Helical acquisition through the chest was performed during the arterial phase of contrast enhancement using IV contrast. 2D and 3D reconstructions were performed by the CT technologist. Dose reduction techniques were used.   IV CONTRAST: Iohexol (Omni) 80 mL     FINDINGS:  ANGIOGRAM CHEST: Suboptimal timing of the contrast bolus as the aorta is enhanced greater than the pulmonary arteries. The peripheral vessels to the right upper, middle, and lower lobes are attenuated by extensive infiltrates. There are possible tiny   emboli within the distal right main pulmonary artery on image 286 of series 4 and within a segmental vessel to the right lower lobe on image 263. No evidence for emboli to the left lung. No evidence for aortic dissection.      RV/LV RATIO: Greater than 1 suggesting right heart strain.     LUNGS AND PLEURA: Extensive nodular airspace infiltrates throughout the right upper, middle, and lower lobes. Considerably less reticulonodular infiltrates within the lingula and left lower lobe. No effusions.     MEDIASTINUM: Enlarged right hilar lymph node measuring 3.0 x 1.8 cm borderline-enlarged subcarinal node, otherwise no mediastinal adenopathy. Small esophageal hiatal hernia.     LIMITED UPPER ABDOMEN: Negative.     MUSCULOSKELETAL: Negative.     IMPRESSION:   CONCLUSION:  1.  Evaluation for pulmonary emboli compromised by the phase of the contrast bolus and extensive right lung infiltrates. There are questionable tiny emboli within the distal right main pulmonary artery and  segmental branch vessel to the right lower lobe.  2.  Extensive infiltrates throughout the right lung and with lesser infiltrates within the lingula and left lower lobe concerning for pneumonia. Neoplastic process considered less likely, but not entirely excluded. Follow-up CT chest exam recommended   after therapy.  3.  Enlarged right hilar lymph node.  4.  Results discussed with Dr. Jonh Dodge on 09/25/2019 at 9:15 PM.    CXR: 10/14: unable to view as it was done through [facility] but read as no acute chest process  CXR 11/15: unable to view but read as newly developing RLL infiltrate.    Assessment/Plan  59yoM with recurrent pneumonias of unclear origin. Patient does have reflux that is pretty severe related to a hiatal hernia. He is already sleeping upright. I have increased his PPI to two times a day for a trial period.    I also wonder if the Xtandi and previous Zytiga are playing a role here given the relatively high percentage of patients who get respiratory infections.    We have not captured what this is--all micro has been negative. Therefore at some point a bronchoscopy may be helpful but only in the setting of an active process. To that end, I have ordered CBC and procalcitonin today to figure out what this is. If no evidence of active infection, bronch will not help us. At the time his symptoms recur, I would like to then scan him and arrange for bronchoscopy. CT chest would also help see the severity of the infection and his lung architecture.    He should continue his Eliquis for PE.    I would like to see him back in 2 months to discuss findings with him.     Marcela Mari MD  Electronically signed on 11/19/2019 8:54 AM

## 2021-06-19 NOTE — PROGRESS NOTES
[facility] Hematology and Oncology Progress Note    Patient: Amauri Shell  MRN: [id number]  Date of Service:         Reason for Visit    Chief Complaint   Patient presents with     HE Cancer     Malignant Hematology     Prostate Cancer       Assessment and Plan    1.  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  Did receive radiation to this bone lesion.  He also started on systemic therapy.  Our Pathologist did review his case and they do feel that he has myeloma.  We will continue his weekly Velcade dexamethasone.  He will do 10 mg of dexamethasone instead of 20. We will monitor his myeloma labs as well.  He has side effects from this treatment we will likely put him on observation.    2.  Metastatic prostate cancer: This was made from a biopsy of a L3 bone lesion.  He also had adenopathy.  He is currently on Lupron.  His PSA I think has been responding.  His PSA over the last couple months has remained stable.  We will continue Lupron every 12 weeks, he is due for in 2 weeks.  Due to his young age we are going to follow the [place] study and add in Zytiga.  He will get 1000 mg daily.  We will do 5 mg of prednisone twice a day along with that.  On the days that he is getting the dexamethasone for his Velcade we can hold the prednisone.    3. Thyroid nodule: This was found on PET scan and it was hypermetabolic.  Ultrasound did not show any nodules and was consistent with diffuse thyroiditis.  We will monitor his thyroid function    4.  Insomnia: We have decreased the dose of the dexamethasone.  I will increase his trazodone to 100 mg instead of 50 mg at bedtime.    5.  Low back pain: Likely from his spinal stenosis.  I will refer him to the spine center    ECOG Performance   ECOG Performance Status: 1     Distress Assessment  Distress Assessment Score: 7 (panic attacks): Encourage him to try to see the psychologist at the [facility] if he can    Pain  Currently in Pain: No/denies      Problem  List    1. Prostate cancer (H)  PSA, Diagnostic (Prostatic-Specific Antigen)   2. Multiple myeloma not having achieved remission (H)  Injection Appointment    Injection Appointment    Injection Appointment    CC OFFICE VISIT LONG    Injection Appointment   3. Prostate cancer metastatic to bone (H)     4. Degeneration of lumbar intervertebral disc     5. Insomnia     6. Back pain        ______________________________________________________________________________    History of Present Illness    Diagnosis:   1.  Metastatic prostate cancer: Metastatic disease to retroperitoneal pelvic lymph nodes.     2.  Solitary plasmacytoma of bone/myeloma: Involving L3.  Minimal bone marrow involvement. .   3.  Thyroid lesion: Likely goiter/thyroid nodule.  PET positive.      Treatment:  1. He was started on Lupron.  First dose November 24, 2017.    2.  Started on treatment with Velcade and dexamethasone on 12/6/2017.  Weekly.    Was off from April until May due to being transferred to [facility].  He had radiation to the L3 metastases, I believe SBRT 20 gray ×1 fraction.  3. Observation. He had been followed every 6 months or so by ENT    Interim history:  Is here today to continue on treatment.  He has been getting weekly Velcade with weekly oral dexamethasone.  He states that he is doing okay with the injections has not noticed any side effects.  He does have problems the dexamethasone worsening his insomnia.  He is requesting to decrease the dose of the dexamethasone.  He states that he has insomnia every night and it has gotten worse but the days around the dexamethasone are significantly worse.  He states that he takes trazodone for the insomnia that used to work but it does not seem to be working much anymore.  His other complaint is he has low back pain.  He says he does not sleep well because of the pain as well.  Other than that he denies any other new symptoms.  He denies any significant peripheral neuropathy      Pain  Status  Currently in Pain: No/denies    Review of Systems    Constitutional  Constitutional (WDL): Exceptions to WDL  Fatigue: Fatigue not relieved by rest - Limiting instrumental ADL  Neurosensory  Neurosensory (WDL): Exceptions to WDL  Ataxia: Asymptomatic, clinical or diagnostic observations only, intervention not indicated (right leg)  Peripheral Sensory Neuropathy: Asymptomatic, loss of deep tendon reflexes or paresthesia (right leg)  Eye   Eye Disorder (WDL): Exceptions to WDL  Blurred Vision: Intervention not indicated (black spots at times)  Ear  Ear Disorder (WDL): All ear disorder elements are within defined limits  Cardiovascular  Cardiovascular (WDL): All cardiovascular elements are within defined limits  Pulmonary  Respiratory (WDL): Within Defined Limits  Gastrointestinal  Gastrointestinal (WDL): Exceptions to WDL  Nausea: Loss of appetite without alteration in eating habits (not controlled with zofran; ativan with relief)  Vomitin - 2 episodes ( by 5 minutes) in 24 hrs (when he takes dexamethasone)  Dry Mouth: Symptomatic (e.g., dry or thick saliva) without significant dietary alteration, unstimulated saliva flow >0.2 ml/min  Genitourinary  Genitourinary (WDL): All genitourinary elements are within defined limits  Lymphatic  Lymph (WDL): All lymph disorder elements are within defined limits  Musculoskeletal and Connective Tissue  Musculoskeletal and Connetive Tissue Disorders (WDL): Exceptions to WDL  Arthralgia: Mild pain  Muscle Weakness : Symptomatic, perceived by patient but not evident on physical exam (right leg; )  Integumentary  Integumentary (WDL): All integumentary elements are within defined limits  Patient Coping  Patient Coping: Accepting  Distress Assessment  Distress Assessment Score: 7 (panic attacks)  Accompanied by  Accompanied by: Law Enforcment  Oral Chemo Adherence       Past History  Past Medical History:   Diagnosis Date     Hypertension      Metastatic cancer (H)       Multiple myeloma (H)      Plasmacytoma (H)      Prostate cancer (H)        PHYSICAL EXAM:  /75  Pulse 94  Temp 98.2  F (36.8  C) (Oral)   Wt (!) 223 lb (101.2 kg)  SpO2 94%  BMI 32 kg/m2  GENERAL: no acute distress. Cooperative in conversation. Here with 2 guards from [facility]  HEENT: pupils are equal, round and reactive. Oromucosa is clean and intact. No ulcerations or mucositis noted. No bleeding noted.  RESP: lungs are clear bilaterally per auscultation. Regular respiratory rate. No wheezes or rhonchi.  CV: Regular, rate and rhythm. No murmurs.  ABD: soft, nontender. Positive bowel sounds. No organomegaly.   MUSCULOSKELETAL: No lower extremity swelling.   NEURO: non focal. Alert and oriented x3.   PSYCH: within normal limits. No depression or anxiety.  SKIN: warm dry intact   LYMPH: no cervical, supraclavicular lymphadenopathy    Lab Results    Recent Results (from the past 168 hour(s))   Comprehensive Metabolic Panel   Result Value Ref Range    Sodium 141 136 - 145 mmol/L    Potassium 3.7 3.5 - 5.0 mmol/L    Chloride 109 (H) 98 - 107 mmol/L    CO2 22 22 - 31 mmol/L    Anion Gap, Calculation 10 5 - 18 mmol/L    Glucose 114 70 - 125 mg/dL    BUN 26 (H) 8 - 22 mg/dL    Creatinine 0.74 0.70 - 1.30 mg/dL    GFR MDRD Af Amer >60 >60 mL/min/1.73m2    GFR MDRD Non Af Amer >60 >60 mL/min/1.73m2    Bilirubin, Total 0.4 0.0 - 1.0 mg/dL    Calcium 9.7 8.5 - 10.5 mg/dL    Protein, Total 6.9 6.0 - 8.0 g/dL    Albumin 3.8 3.5 - 5.0 g/dL    Alkaline Phosphatase 33 (L) 45 - 120 U/L    AST 10 0 - 40 U/L    ALT 17 0 - 45 U/L   Immunoglobulins, Quantitative   Result Value Ref Range    Immunoglobulin G 906 700 - 1700 mg/dL    Immunoglobulin M 61 60 - 280 mg/dL    Immunoglobulin A 331 65 - 400 mg/dL   HM1 (CBC with Diff)   Result Value Ref Range    WBC 14.5 (H) 4.0 - 11.0 thou/uL    RBC 3.64 (L) 4.40 - 6.20 mill/uL    Hemoglobin 12.6 (L) 14.0 - 18.0 g/dL    Hematocrit 35.9 (L) 40.0 - 54.0 %    MCV 99 80 - 100 fL    MCH 34.6  (H) 27.0 - 34.0 pg    MCHC 35.1 32.0 - 36.0 g/dL    RDW 13.4 11.0 - 14.5 %    Platelets 319 140 - 440 thou/uL    MPV 10.0 8.5 - 12.5 fL    Neutrophils % 80 (H) 50 - 70 %    Lymphocytes % 11 (L) 20 - 40 %    Monocytes % 10 2 - 10 %    Eosinophils % 0 0 - 6 %    Basophils % 0 0 - 2 %    Neutrophils Absolute 11.5 (H) 2.0 - 7.7 thou/uL    Lymphocytes Absolute 1.5 0.8 - 4.4 thou/uL    Monocytes Absolute 1.4 (H) 0.0 - 0.9 thou/uL    Eosinophils Absolute 0.0 0.0 - 0.4 thou/uL    Basophils Absolute 0.0 0.0 - 0.2 thou/uL   PSA, Diagnostic (Prostatic-Specific Antigen)   Result Value Ref Range    PSA 5.1 (H) 0.0 - 3.5 ng/mL   PSA was 5.2 in May    Imaging    No results found.      Signed by: Aline Roth, CNP

## 2021-06-20 NOTE — PROGRESS NOTES
Amauri came to clinic this morning accompanied by [facility] guards for his next dose of velcade (C4D8).  VSS.  Pt assessed. He continues to have hand cramping as well as hot flashes with sweats up to 5x per day.   Velcade was given sq into his abdomen.  He tolerated the injection well and site was covered with a bandaid.  Amauri d/c from clinic via wheelchair accompanied by guards.  Summary of care sent to [facility].

## 2021-06-20 NOTE — PROGRESS NOTES
[facility] Hematology and Oncology Progress Note    Patient: Amauri Shell  MRN: [id number]  Date of Service: 09/26/18          Reason for Visit    Chief Complaint   Patient presents with     HE Cancer       Assessment and Plan    1.  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  Did receive radiation to this bone lesion.  He also started on systemic therapy.  Our Pathologist did review his case and they do feel that he has myeloma.  We will continue his weekly Velcade dexamethasone.  He will continue 10 mg of dexamethasone weekly. We will monitor his myeloma labs as well.  He has side effects from this treatment we will likely put him on observation.    2.  Metastatic prostate cancer: This was made from a biopsy of a L3 bone lesion.  He also had adenopathy.  He is currently on Lupron and in July, was started on Zytiga. Tolerating this well. Will recheck psa in October.     3. Thyroid nodule: This was found on PET scan and it was hypermetabolic.  Ultrasound did not show any nodules and was consistent with diffuse thyroiditis.  We will monitor his thyroid function      ECOG Performance   ECOG Performance Status: 0 (daily walks- 0.5 mi daily)     Distress Assessment  Distress Assessment Score: No distress:     Pain  Currently in Pain: Yes  Pain Score (Initial OR Reassessment): 2  Location: left wrist/hand      Problem List    1. Multiple myeloma not having achieved remission (H)  CC OFFICE VISIT LONG    Injection Appointment    Injection Appointment    Injection Appointment    DISCONTINUED: bortezomib 2.9 mg chemo (VELCADE)    DISCONTINUED: diphenhydrAMINE injection 50 mg (BENADRYL)    DISCONTINUED: famotidine 20 mg/2 mL injection 20 mg (PEPCID)    DISCONTINUED: hydrocortisone sod succ (PF) 100 mg/2 mL injection 100 mg    DISCONTINUED: acetaminophen tablet 1,000 mg (TYLENOL)      ______________________________________________________________________________    History of Present  Illness    Diagnosis:   1.  Metastatic prostate cancer: Metastatic disease to retroperitoneal pelvic lymph nodes.     2.  Solitary plasmacytoma of bone/myeloma: Involving L3.  Minimal bone marrow involvement. .   3.  Thyroid lesion: Likely goiter/thyroid nodule.  PET positive.      Treatment:  1. He was started on Lupron.  First dose November 24, 2017.  Zytiga added in July 2018 based on Latitude study.   2.  Started on treatment with Velcade and dexamethasone on 12/6/2017.  Weekly.    Was off from April until May due to being transferred to half-way.  He had radiation to the L3 metastases, I believe SBRT 20 gray ×1 fraction.  3. Observation. He had been followed every 6 months or so by ENT    Interim history:  Is here today to continue on treatment.  He has been getting weekly Velcade with weekly oral dexamethasone.  He states that he is doing okay with the injections has not noticed any side effects.  He continues to have some chronic pain.  Other than that he denies any other new symptoms.  He denies any significant peripheral neuropathy but is having some mild neuropathy in hands/fingers, is intermittent. Does also have some left thumb pain       Pain Status  Currently in Pain: Yes    Review of Systems    Constitutional  Fatigue: Fatigue relieved by rest  Fever: None (feels hot all the time)  Chills: None  Weight Gain: None  Weight Loss: None  Neurosensory  Neurosensory (WDL): Exceptions to WDL  Peripheral Motor Neuropathy: Asymptomatic, clinical or diagnostic observations only, intervention not indicated (relates to low back R>L)  Ataxia: Asymptomatic, clinical or diagnostic observations only, intervention not indicated  Peripheral Sensory Neuropathy: Asymptomatic, loss of deep tendon reflexes or paresthesia (hands not fingers)  Confusion: None  Syncope: None  Eye   Eye Disorder (WDL): All eye disorder elements are within defined limits  Ear  Ear Disorder (WDL): All ear disorder elements are within defined limits  (hearing problem right ear)  Cardiovascular  Cardiovascular (WDL): All cardiovascular elements are within defined limits  Palpitations: None  Edema: No  Phlebitis: None  Superficial thrombophlebitis: None  Pulmonary  Respiratory (WDL): Exceptions to WDL  Cough: None  Dyspnea: Shortness of breath with moderate exertion (nl walking, making  bed)  Hypoxia: None  Gastrointestinal  Gastrointestinal (WDL): Exceptions to WDL  Anorexia: Loss of appetite without alteration in eating habits  Constipation: Occasional or intermittent symptoms, occasional use of stool softeners, laxatives, dietary modification, or enema  Diarrhea: None  Dysphagia: None  Esophagitis: None  Nausea: Loss of appetite without alteration in eating habits  Pharyngitis: None  Vomiting: None  Dysgeusia: None  Dry Mouth: Symptomatic (e.g., dry or thick saliva) without significant dietary alteration, unstimulated saliva flow >0.2 ml/min  Genitourinary  Genitourinary (WDL): All genitourinary elements are within defined limits  Lymphatic  Lymph (WDL): All lymph disorder elements are within defined limits  Musculoskeletal and Connective Tissue  Musculoskeletal and Connetive Tissue Disorders (WDL): Exceptions to WDL  Arthralgia: Mild pain (wrists and ankles at times)  Bone Pain: None  Muscle Weakness : None  Myalgia: None  Integumentary  Integumentary (WDL): All integumentary elements are within defined limits  Patient Coping  Patient Coping: Accepting  Distress Assessment  Distress Assessment Score: No distress  Accompanied by     Oral Chemo Adherence       Past History  Past Medical History:   Diagnosis Date     Hypertension      Metastatic cancer (H)      Multiple myeloma (H)      Plasmacytoma (H)      Prostate cancer (H)        PHYSICAL EXAM:  BP (!) 194/89  Pulse 65  Temp 97.9  F (36.6  C) (Oral)   Wt (!) 224 lb 12.8 oz (102 kg)  SpO2 97%  BMI 32.26 kg/m2  GENERAL: no acute distress. Cooperative in conversation. Here with 2 guards from [facility]  HEENT:  pupils are equal, round and reactive. Oromucosa is clean and intact. No ulcerations or mucositis noted. No bleeding noted.  RESP: lungs are clear bilaterally per auscultation. Regular respiratory rate. No wheezes or rhonchi.  CV: Regular, rate and rhythm. No murmurs.  ABD: soft, nontender. Positive bowel sounds. No organomegaly.   MUSCULOSKELETAL: No lower extremity swelling.   NEURO: non focal. Alert and oriented x3.   PSYCH: within normal limits. No depression or anxiety.  SKIN: warm dry intact   LYMPH: no cervical, supraclavicular lymphadenopathy    Lab Results  Lab Standing Order on 09/26/2018   Component Date Value Ref Range Status     Sodium 09/26/2018 143  136 - 145 mmol/L Final     Potassium 09/26/2018 3.5  3.5 - 5.0 mmol/L Final     Chloride 09/26/2018 106  98 - 107 mmol/L Final     CO2 09/26/2018 29  22 - 31 mmol/L Final     Anion Gap, Calculation 09/26/2018 8  5 - 18 mmol/L Final     Glucose 09/26/2018 88  70 - 125 mg/dL Final     BUN 09/26/2018 18  8 - 22 mg/dL Final     Creatinine 09/26/2018 0.74  0.70 - 1.30 mg/dL Final     GFR MDRD Af Amer 09/26/2018 >60  >60 mL/min/1.73m2 Final     GFR MDRD Non Af Amer 09/26/2018 >60  >60 mL/min/1.73m2 Final     Bilirubin, Total 09/26/2018 0.6  0.0 - 1.0 mg/dL Final     Calcium 09/26/2018 9.7  8.5 - 10.5 mg/dL Final     Protein, Total 09/26/2018 6.7  6.0 - 8.0 g/dL Final     Albumin 09/26/2018 3.9  3.5 - 5.0 g/dL Final     Alkaline Phosphatase 09/26/2018 41* 45 - 120 U/L Final     AST 09/26/2018 14  0 - 40 U/L Final     ALT 09/26/2018 17  0 - 45 U/L Final     WBC 09/26/2018 9.7  4.0 - 11.0 thou/uL Final     RBC 09/26/2018 3.82* 4.40 - 6.20 mill/uL Final     Hemoglobin 09/26/2018 13.4* 14.0 - 18.0 g/dL Final     Hematocrit 09/26/2018 39.6* 40.0 - 54.0 % Final     MCV 09/26/2018 104* 80 - 100 fL Final     MCH 09/26/2018 35.1* 27.0 - 34.0 pg Final     MCHC 09/26/2018 33.8  32.0 - 36.0 g/dL Final     RDW 09/26/2018 12.4  11.0 - 14.5 % Final     Platelets 09/26/2018 237   140 - 440 thou/uL Final     MPV 09/26/2018 11.6  8.5 - 12.5 fL Final     Neutrophils % 09/26/2018 73* 50 - 70 % Final     Lymphocytes % 09/26/2018 18* 20 - 40 % Final     Monocytes % 09/26/2018 8  2 - 10 % Final     Eosinophils % 09/26/2018 2  0 - 6 % Final     Basophils % 09/26/2018 0  0 - 2 % Final     Neutrophils Absolute 09/26/2018 7.0  2.0 - 7.7 thou/uL Final     Lymphocytes Absolute 09/26/2018 1.7  0.8 - 4.4 thou/uL Final     Monocytes Absolute 09/26/2018 0.7  0.0 - 0.9 thou/uL Final     Eosinophils Absolute 09/26/2018 0.2  0.0 - 0.4 thou/uL Final     Basophils Absolute 09/26/2018 0.0  0.0 - 0.2 thou/uL Final   Lab Standing Order on 09/19/2018   Component Date Value Ref Range Status     Sodium 09/19/2018 143  136 - 145 mmol/L Final     Potassium 09/19/2018 3.7  3.5 - 5.0 mmol/L Final     Chloride 09/19/2018 109* 98 - 107 mmol/L Final     CO2 09/19/2018 27  22 - 31 mmol/L Final     Anion Gap, Calculation 09/19/2018 7  5 - 18 mmol/L Final     Glucose 09/19/2018 96  70 - 125 mg/dL Final     BUN 09/19/2018 18  8 - 22 mg/dL Final     Creatinine 09/19/2018 0.80  0.70 - 1.30 mg/dL Final     GFR MDRD Af Amer 09/19/2018 >60  >60 mL/min/1.73m2 Final     GFR MDRD Non Af Amer 09/19/2018 >60  >60 mL/min/1.73m2 Final     Bilirubin, Total 09/19/2018 0.8  0.0 - 1.0 mg/dL Final     Calcium 09/19/2018 9.8  8.5 - 10.5 mg/dL Final     Protein, Total 09/19/2018 6.7  6.0 - 8.0 g/dL Final     Albumin 09/19/2018 3.8  3.5 - 5.0 g/dL Final     Alkaline Phosphatase 09/19/2018 41* 45 - 120 U/L Final     AST 09/19/2018 14  0 - 40 U/L Final     ALT 09/19/2018 21  0 - 45 U/L Final     Immunoglobulin G 09/19/2018 894  700 - 1700 mg/dL Final     Immunoglobulin M 09/19/2018 42* 60 - 280 mg/dL Final     Immunoglobulin A 09/19/2018 375  65 - 400 mg/dL Final     [place] Free Lt Chain 09/19/2018 1.10  0.33 - 1.94 mg/dL Final     Lambda Free Lt Chain 09/19/2018 1.06  0.57 - 2.63 mg/dL Final     Kappa Lambda Ratio 09/19/2018 1.04  0.26 - 1.65 Final     PERFORMED AT  [address] [address]      Albumin % 09/19/2018 63.6  51.0 - 67.0 % Final     Albumin  09/19/2018 4.3  3.2 - 4.7 g/dL Final     Alpha 1 % 09/19/2018 2.7  2.0 - 4.0 % Final     Alpha 1 09/19/2018 0.2  0.1 - 0.3 g/dL Final     Alpha 2 % 09/19/2018 10.2  5.0 - 13.0 % Final     Alpha 2 09/19/2018 0.7  0.4 - 0.9 g/dL Final     % Beta 09/19/2018 11.2  10.0 - 17.0 % Final     Beta 09/19/2018 0.8  0.7 - 1.2 g/dL Final     Gamma Globulin % 09/19/2018 12.3  9.0 - 20.0 % Final     Gamma Globulin 09/19/2018 0.8  0.6 - 1.4 g/dL Final     ELP Comment 09/19/2018 Two faint bands are visible in the gamma globulin region of the gel strip, which may represent a monoclonal globulin, but is too faint to quantify. See immunofixation electrophoresis results.           Final     Protein, Total 09/19/2018 6.7  6.0 - 8.0 g/dL Final     Path ICD: 09/19/2018 C90.00   Final     Interpreted By: 09/19/2018 Kavin Muñiz MD   Final     Immunofixation Electrophoresis, Se* 09/19/2018 Two monoclonal IgA-kappa immunoglobulins present.       Final     Path ICD: 09/19/2018 C90.00   Final     Interpreted By: 09/19/2018 Kavin Muñiz MD   Final     WBC 09/19/2018 10.5  4.0 - 11.0 thou/uL Final     RBC 09/19/2018 3.76* 4.40 - 6.20 mill/uL Final     Hemoglobin 09/19/2018 13.4* 14.0 - 18.0 g/dL Final     Hematocrit 09/19/2018 39.3* 40.0 - 54.0 % Final     MCV 09/19/2018 105* 80 - 100 fL Final     MCH 09/19/2018 35.6* 27.0 - 34.0 pg Final     MCHC 09/19/2018 34.1  32.0 - 36.0 g/dL Final     RDW 09/19/2018 12.5  11.0 - 14.5 % Final     Platelets 09/19/2018 249  140 - 440 thou/uL Final     MPV 09/19/2018 11.2  8.5 - 12.5 fL Final     Neutrophils % 09/19/2018 70  50 - 70 % Final     Lymphocytes % 09/19/2018 19* 20 - 40 % Final     Monocytes % 09/19/2018 9  2 - 10 % Final     Eosinophils % 09/19/2018 1  0 - 6 % Final     Basophils % 09/19/2018 0  0 - 2 % Final     Neutrophils  Absolute 09/19/2018 7.3  2.0 - 7.7 thou/uL Final     Lymphocytes Absolute 09/19/2018 2.0  0.8 - 4.4 thou/uL Final     Monocytes Absolute 09/19/2018 1.0* 0.0 - 0.9 thou/uL Final     Eosinophils Absolute 09/19/2018 0.1  0.0 - 0.4 thou/uL Final     Basophils Absolute 09/19/2018 0.0  0.0 - 0.2 thou/uL Final   ]  PSA was 5.2 in May and 5.1 in July.     Imaging    No results found.      Signed by: Aline Roth, CNP

## 2021-06-20 NOTE — PROGRESS NOTES
Pt came into infusion clinic for Velcade as ordered. Pt tolerated this well. Pt left infusion clinic via ambulatory accompanied by guards.

## 2021-06-20 NOTE — PROGRESS NOTES
Call placed to [facility] to report increase of synthroid from 88mcg up to 100mcg.  Faxed request as well to [phone].  Their physician will review.    Mirtha Crawford RN

## 2021-06-20 NOTE — PROGRESS NOTES
"Amauri came to clinic this morning accompanied by [facility] guards for his next dose of velcade.  VSS.  Pt assessed. He continues to have hot flashes with sweats up to 5x per day.   Hot all the time, sweats. Hand cramping is not so bad- 3x in the last week. [place]LSON, no dizziness. Drinking \"plenty of water, good appetite\". States he was getting morphine 30mg two times a day and that he thought it should be morphine 15mg two times a day. He prefers to take it at night because it makes him dizzy. Amauri stated he has been having more anxiety/anxitey attacks and that he can only get ativan after he as taken zofran but c/o that the zofran just makes him vomit. He would like to have his orders changed so he can start with ativan.      Velcade was given sq into his Lower Left abdomen.  He tolerated the injection well and site was covered with a bandaid.  Amauri d/c from clinic via wheelchair accompanied by guards.  Summary of care sent to [facility], including velcade info and request to have Amauri check his temp when he feels hot/sweaty and to seek immediate medical care if temp is greater than 100.4oral  "

## 2021-06-20 NOTE — PROGRESS NOTES
Amauri came to clinic this morning accompanied by [facility] guards for his next dose of velcade ([id number]).  VSS.  Pt assessed.   Velcade was given sq into his abdomen.  He tolerated the injection well and site was covered with a bandaid.  Amauri d/c from clinic via wheelchair accompanied by guards.  Summary of care sent to [facility].

## 2021-06-20 NOTE — PROGRESS NOTES
Pt arrived via wheelchair to clinic for Cycle # 4 Day # 22 of his chemotherapy regimen.  Labs reviewed and pt is ok for treatment.  Administered SQ Velcade into RLQ of ABD per MD order.  Pt tolerated procedure well, no s/s of bleeding or swelling at site.  Pt left with guards via wheelchair.

## 2021-06-20 NOTE — PROGRESS NOTES
Pt came into infusion clinic for his Velcade as ordered. Labs Reviewed. Medications explained to pt who verbalized understanding. Pt tolerated injection with no complications. Pt left infusion clinic via WC accompanied christiano breen.

## 2021-06-20 NOTE — PROGRESS NOTES
Pt here for injection which was given without incident.  Pt does c/o numbness in L hand.  No swelling or redness.  Pt will monitor.  Upon completion pt d/c in wheel chair to lobby with [facility] guards.

## 2021-06-21 NOTE — PROGRESS NOTES
Pt here for injection.  Labs drawn and reviewed.  Injection given without incident.  Pt still complaining of hot flashes but did start effexor Sunday so hopeful this may help.   Pt d/c in wheel chair to lobby with [facility] guards

## 2021-06-21 NOTE — PROGRESS NOTES
Pt arrived via wheelchair to clinic for Cycle # 6 Day # 8 of his chemotherapy regimen.  Pt wanted to know about his MRI results from [facility].  Per Aline, scan shows arthritis in spine but no bone mets.  Pt verbalized understanding of results.  Administered SQ Velcade into RLQ of ABD per MD order.  Pt tolerated procedure well, no s/s of bleeding or swelling at site.  Pt left with guards via wheelchair.

## 2021-06-21 NOTE — PROGRESS NOTES
Pt here in wheelchair with 2 guards for txt. PT was examined by NP today. Velcade inj reviewed and given with bandaid to sites. Grey folder given to guards and pt dc'd in wheelchair with guards. PT tolerated inj without any problems.

## 2021-06-21 NOTE — PROGRESS NOTES
[facility] Hematology and Oncology Progress Note    Patient: Amauri Shell  MRN: [id number]  Date of Service: 11/20/18          Reason for Visit    Cancer     Assessment and Plan    1.  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  Did receive radiation to this bone lesion.  He also started on systemic therapy.  Our Pathologist did review his case and they do feel that he has myeloma.  We will continue his weekly Velcade, dexamethasone.  He will continue 10 mg of dexamethasone weekly. We will monitor his myeloma labs as well.  He has side effects from this treatment we will likely put him on observation. His myeloma labs basically look normal. Still has some evidence on immunofixation.     2.  Metastatic prostate cancer: This was made from a biopsy of a L3 bone lesion.  He also had adenopathy.  He is currently on Lupron and in July 2018, was started on Zytiga. Tolerating this well. PSA is improved and is 1.2. Continue current regimen. Recheck psa in January      ECOG Performance   ECOG Performance Status: 1     Distress Assessment  Distress Assessment Score: 3:     Pain  Currently in Pain: No/denies      Problem List    1. Prostate cancer metastatic to bone (H)     2. Multiple myeloma not having achieved remission (H)  CC OFFICE VISIT LONG    Injection Appointment    Injection Appointment    Injection Appointment    Injection Appointment    DISCONTINUED: bortezomib 2.9 mg chemo (VELCADE)    DISCONTINUED: diphenhydrAMINE injection 50 mg (BENADRYL)    DISCONTINUED: famotidine 20 mg/2 mL injection 20 mg (PEPCID)    DISCONTINUED: hydrocortisone sod succ (PF) injection 100 mg    DISCONTINUED: acetaminophen tablet 1,000 mg (TYLENOL)      ______________________________________________________________________________    History of Present Illness    Diagnosis:   1.  Metastatic prostate cancer: Metastatic disease to retroperitoneal pelvic lymph nodes.     2.  Solitary plasmacytoma of  bone/myeloma: Involving L3.  Minimal bone marrow involvement. .   3.  Thyroid lesion: Likely goiter/thyroid nodule.  PET positive.      Treatment:  1. He was started on Lupron.  First dose November 24, 2017.  Zytiga added in July 2018 based on Latitude study.   2.  Started on treatment with Velcade and dexamethasone on 12/6/2017.  Weekly.    Was off from April until May due to being transferred to retirement.  He had radiation to the L3 metastases, I believe SBRT 20 gray ×1 fraction.  3. Observation. He had been followed every 6 months or so by ENT    Interim history:  Is here today to continue on treatment.  He has been getting weekly Velcade with weekly oral dexamethasone.  He states that he is doing okay with the injections has not noticed any side effects except last cycle had a reaction at the site with redness and skin peeling.  He continues to have some chronic pain but feels like his back pain is better.  Other than that he denies any other new symptoms.  He denies any significant peripheral neuropathy but is having some mild neuropathy in hands/fingers, is intermittent.       Pain Status  Currently in Pain: No/denies    Review of Systems    Constitutional  Constitutional (WDL): Exceptions to WDL  Fatigue: Fatigue not relieved by rest - Limiting instrumental ADL  Neurosensory  Neurosensory (WDL): Exceptions to WDL  Peripheral Sensory Neuropathy: Asymptomatic, loss of deep tendon reflexes or paresthesia(knuckles slight)  Eye   Eye Disorder (WDL): Exceptions to WDL  Dry Eye: Asymptomatic, clinical or diagnostic observations only, mild symptoms relieved by lubricants  Ear  Ear Disorder (WDL): All ear disorder elements are within defined limits  Cardiovascular  Cardiovascular (WDL): All cardiovascular elements are within defined limits  Pulmonary  Respiratory (WDL): Exceptions to WDL  Dyspnea: Shortness of breath with moderate exertion  Gastrointestinal  Gastrointestinal (WDL): Exceptions to WDL  Nausea: Loss of  appetite without alteration in eating habits(getting better)  Dysgeusia: Altered taste but no change in diet(foods taste salty)  Dry Mouth: Symptomatic (e.g., dry or thick saliva) without significant dietary alteration, unstimulated saliva flow >0.2 ml/min  Genitourinary  Genitourinary (WDL): All genitourinary elements are within defined limits  Lymphatic  Lymph (WDL): Assessment not pertinent to visit  Musculoskeletal and Connective Tissue  Musculoskeletal and Connetive Tissue Disorders (WDL): All Musculoskeletal and Connetive Tissue Disorder elements are within defined limits  Integumentary  Integumentary (WDL): Exceptions to WDL(redness where last injection was given rt abd)  Patient Coping  Patient Coping: Accepting  Distress Assessment  Distress Assessment Score: 3  Accompanied by  Accompanied by:  Enforcment  Oral Chemo Adherence       Past History  Past Medical History:   Diagnosis Date     Hypertension      Metastatic cancer (H)      Multiple myeloma (H)      Plasmacytoma (H)      Prostate cancer (H)        PHYSICAL EXAM:  /88   Pulse 69   Temp 98.3  F (36.8  C) (Oral)   Wt (!) 226 lb (102.5 kg)   SpO2 94%   BMI 32.43 kg/m    GENERAL: no acute distress. Cooperative in conversation. Here with 2 guards from DOC  HEENT: pupils are equal, round and reactive. Oromucosa is clean and intact. No ulcerations or mucositis noted. No bleeding noted.  RESP: lungs are clear bilaterally per auscultation. Regular respiratory rate. No wheezes or rhonchi.  CV: Regular, rate and rhythm. No murmurs.  ABD: soft, nontender. Positive bowel sounds. No organomegaly.   MUSCULOSKELETAL: No lower extremity swelling.   NEURO: non focal. Alert and oriented x3.   PSYCH: within normal limits. No depression or anxiety.  SKIN: warm dry intact   LYMPH: no cervical, supraclavicular lymphadenopathy    Lab Results  Lab Standing Order on 11/20/2018   Component Date Value Ref Range Status     TSH 11/20/2018 6.26* 0.30 - 5.00 uIU/mL  Final   Lab Standing Order on 11/14/2018   Component Date Value Ref Range Status     PSA 11/14/2018 1.2  0.0 - 3.5 ng/mL Final     Sodium 11/14/2018 145  136 - 145 mmol/L Final     Potassium 11/14/2018 3.8  3.5 - 5.0 mmol/L Final     Chloride 11/14/2018 106  98 - 107 mmol/L Final     CO2 11/14/2018 31  22 - 31 mmol/L Final     Anion Gap, Calculation 11/14/2018 8  5 - 18 mmol/L Final     Glucose 11/14/2018 97  70 - 125 mg/dL Final     BUN 11/14/2018 17  8 - 22 mg/dL Final     Creatinine 11/14/2018 0.76  0.70 - 1.30 mg/dL Final     GFR MDRD Af Amer 11/14/2018 >60  >60 mL/min/1.73m2 Final     GFR MDRD Non Af Amer 11/14/2018 >60  >60 mL/min/1.73m2 Final     Bilirubin, Total 11/14/2018 0.6  0.0 - 1.0 mg/dL Final     Calcium 11/14/2018 9.7  8.5 - 10.5 mg/dL Final     Protein, Total 11/14/2018 6.7  6.0 - 8.0 g/dL Final     Albumin 11/14/2018 3.9  3.5 - 5.0 g/dL Final     Alkaline Phosphatase 11/14/2018 41* 45 - 120 U/L Final     AST 11/14/2018 15  0 - 40 U/L Final     ALT 11/14/2018 22  0 - 45 U/L Final     Immunoglobulin G 11/14/2018 922  700-1,700 mg/dL Final     Immunoglobulin M 11/14/2018 35* 60 - 280 mg/dL Final     Immunoglobulin A 11/14/2018 371  65 - 400 mg/dL Final     [place] Free Lt Chain 11/14/2018 1.00  0.33 - 1.94 mg/dL Final     Lambda Free Lt Chain 11/14/2018 0.66  0.57 - 2.63 mg/dL Final     Kappa Lambda Ratio 11/14/2018 1.52  0.26 - 1.65 Final    PERFORMED AT  [address] [address]      Albumin % 11/14/2018 61.6  51.0 - 67.0 % Final     Albumin  11/14/2018 4.1  3.2 - 4.7 g/dL Final     Alpha 1 % 11/14/2018 2.6  2.0 - 4.0 % Final     Alpha 1 11/14/2018 0.2  0.1 - 0.3 g/dL Final     Alpha 2 % 11/14/2018 10.8  5.0 - 13.0 % Final     Alpha 2 11/14/2018 0.7  0.4 - 0.9 g/dL Final     % Beta 11/14/2018 12.2  10.0 - 17.0 % Final     Beta 11/14/2018 0.8  0.7 - 1.2 g/dL Final     Gamma Globulin % 11/14/2018 12.8  9.0 - 20.0 % Final     Gamma Globulin  11/14/2018 0.8  0.6 - 1.4 g/dL Final     ELP Comment 11/14/2018 Two faint bands are visible in the gamma globulin region of the gel strip, which may represent a monoclonal globulin, but is too faint to quantify. See immunofixation electrophoresis to further characterize.     Final     Protein, Total 11/14/2018 6.6  6.0 - 8.0 g/dL Final     Path ICD: 11/14/2018 C90.00   Final     Interpreted By: 11/14/2018 Kavin Muñiz MD   Final     Immunofixation Electrophoresis, Se* 11/14/2018 Monoclonal IgA-kappa immunoglobulin present.    Possible additional faint free monoclonal kappa light chains present.     Final     Path ICD: 11/14/2018 C90.00   Final     Interpreted By: 11/14/2018 Kavin Muñiz MD   Final     WBC 11/14/2018 9.8  4.0 - 11.0 thou/uL Final     RBC 11/14/2018 3.91* 4.40 - 6.20 mill/uL Final     Hemoglobin 11/14/2018 13.7* 14.0 - 18.0 g/dL Final     Hematocrit 11/14/2018 40.4  40.0 - 54.0 % Final     MCV 11/14/2018 103* 80 - 100 fL Final     MCH 11/14/2018 35.0* 27.0 - 34.0 pg Final     MCHC 11/14/2018 33.9  32.0 - 36.0 g/dL Final     RDW 11/14/2018 12.4  11.0 - 14.5 % Final     Platelets 11/14/2018 213  140 - 440 thou/uL Final     MPV 11/14/2018 11.4  8.5 - 12.5 fL Final     Neutrophils % 11/14/2018 75* 50 - 70 % Final     Lymphocytes % 11/14/2018 16* 20 - 40 % Final     Monocytes % 11/14/2018 7  2 - 10 % Final     Eosinophils % 11/14/2018 1  0 - 6 % Final     Basophils % 11/14/2018 0  0 - 2 % Final     Neutrophils Absolute 11/14/2018 7.4  2.0 - 7.7 thou/uL Final     Lymphocytes Absolute 11/14/2018 1.6  0.8 - 4.4 thou/uL Final     Monocytes Absolute 11/14/2018 0.7  0.0 - 0.9 thou/uL Final     Eosinophils Absolute 11/14/2018 0.1  0.0 - 0.4 thou/uL Final     Basophils Absolute 11/14/2018 0.0  0.0 - 0.2 thou/uL Final     Lab Results   Component Value Date    PSA 1.2 11/14/2018    PSA 1.4 10/17/2018    PSA 5.1 (H) 07/18/2018    PSA 5.2 (H) 05/11/2018   ]      Imaging    No results found.      Signed by:  Aline Roth, CNP

## 2021-06-21 NOTE — PROGRESS NOTES
Amauri came to clinic this morning accompanied by [employer] guards for his next dose of velcade (C6D15).  VSS.  Pt assessed.   Velcade was given sq into his abdomen.  He tolerated the injection well and site was covered with a bandaid.  Amauri d/c from clinic via wheelchair accompanied by guards.  Summary of care sent to DOC.

## 2021-06-21 NOTE — PROGRESS NOTES
Pt here for injections after seeing NP.  velcade and eligard given as directed and pt d/c in wheel chair to lobby with DOC guards.      Pt did note hot flashes have improved.

## 2021-06-21 NOTE — PROGRESS NOTES
[facility] Hematology and Oncology Progress Note    Patient: Amauri Shell  MRN: [id number]  Date of Service: 10/24/18          Reason for Visit    Cancer     Assessment and Plan    1.  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  Did receive radiation to this bone lesion.  He also started on systemic therapy.  Our Pathologist did review his case and they do feel that he has myeloma.  We will continue his weekly Velcade, dexamethasone.  He will continue 10 mg of dexamethasone weekly. We will monitor his myeloma labs as well.  He has side effects from this treatment we will likely put him on observation. His myeloma labs basically look normal. Still has some evidence on immunofixation.     2.  Metastatic prostate cancer: This was made from a biopsy of a L3 bone lesion.  He also had adenopathy.  He is currently on Lupron and in July 2018, was started on Zytiga. Tolerating this well. PSA is improved and is 1.3. Continue current regimen    3. Hot flashes: likely from lupron. Has started on effexor and that has helped a lot. Will continue to monitor. Can increase effexor if needed, or could try Megace 20mg two times a day.       ECOG Performance   ECOG Performance Status: 1     Distress Assessment  Distress Assessment Score: 3:     Pain  Currently in Pain: Yes  Pain Score (Initial OR Reassessment): 2  Location: low back      Problem List    1. Multiple myeloma not having achieved remission (H)  CC OFFICE VISIT LONG    Injection Appointment    Injection Appointment    Injection Appointment    Injection Appointment    DISCONTINUED: bortezomib 2.9 mg chemo (VELCADE)    DISCONTINUED: diphenhydrAMINE injection 50 mg (BENADRYL)    DISCONTINUED: famotidine 20 mg/2 mL injection 20 mg (PEPCID)    DISCONTINUED: hydrocortisone sod succ (PF) 100 mg/2 mL injection 100 mg    DISCONTINUED: acetaminophen tablet 1,000 mg (TYLENOL)       ______________________________________________________________________________    History of Present Illness    Diagnosis:   1.  Metastatic prostate cancer: Metastatic disease to retroperitoneal pelvic lymph nodes.     2.  Solitary plasmacytoma of bone/myeloma: Involving L3.  Minimal bone marrow involvement. .   3.  Thyroid lesion: Likely goiter/thyroid nodule.  PET positive.      Treatment:  1. He was started on Lupron.  First dose November 24, 2017.  Zytiga added in July 2018 based on Latitude study.   2.  Started on treatment with Velcade and dexamethasone on 12/6/2017.  Weekly.    Was off from April until May due to being transferred to long-term.  He had radiation to the L3 metastases, I believe SBRT 20 gray ×1 fraction.  3. Observation. He had been followed every 6 months or so by ENT    Interim history:  Is here today to continue on treatment.  He has been getting weekly Velcade with weekly oral dexamethasone.  He states that he is doing okay with the injections has not noticed any side effects.  He continues to have some chronic pain.  Other than that he denies any other new symptoms.  He denies any significant peripheral neuropathy but is having some mild neuropathy in hands/fingers, is intermittent. States the hot flashes are better with starting effexor.       Pain Status  Currently in Pain: Yes    Review of Systems    Constitutional  Constitutional (WDL): Exceptions to WDL  Fatigue: Fatigue relieved by rest  Neurosensory  Neurosensory (WDL): Exceptions to WDL  Peripheral Motor Neuropathy: Asymptomatic, clinical or diagnostic observations only, intervention not indicated  Ataxia: Asymptomatic, clinical or diagnostic observations only, intervention not indicated  Peripheral Sensory Neuropathy: Asymptomatic, loss of deep tendon reflexes or paresthesia  Confusion: Mild disorientation  Eye   Eye Disorder (WDL): All eye disorder elements are within defined limits  Ear  Ear Disorder (WDL): All ear disorder  elements are within defined limits  Cardiovascular  Cardiovascular (WDL): Exceptions to WDL  Pulmonary  Respiratory (WDL): Exceptions to WDL  Dyspnea: Shortness of breath with minimal exertion, limiting instrumental ADL  Gastrointestinal  Gastrointestinal (WDL): All gastrointestinal elements are within defined limits  Constipation: Persistent symptoms with regular use of laxatives or enemas, limiting instrumental ADL  Genitourinary  Genitourinary (WDL): All genitourinary elements are within defined limits  Lymphatic  Lymph (WDL): All lymph disorder elements are within defined limits  Musculoskeletal and Connective Tissue  Musculoskeletal and Connetive Tissue Disorders (WDL): Exceptions to WDL  Arthralgia: Mild pain (wrist and ankles)  Integumentary  Integumentary (WDL): All integumentary elements are within defined limits  Patient Coping  Patient Coping: Accepting;Depression  Distress Assessment  Distress Assessment Score: 3  Accompanied by  Accompanied by: Law Enforcment  Oral Chemo Adherence       Past History  Past Medical History:   Diagnosis Date     Hypertension      Metastatic cancer (H)      Multiple myeloma (H)      Plasmacytoma (H)      Prostate cancer (H)        PHYSICAL EXAM:  /73  Pulse 74  Temp 98.4  F (36.9  C)  Wt (!) 226 lb (102.5 kg)  SpO2 96%  BMI 32.43 kg/m2  GENERAL: no acute distress. Cooperative in conversation. Here with 2 guards from DOC  HEENT: pupils are equal, round and reactive. Oromucosa is clean and intact. No ulcerations or mucositis noted. No bleeding noted.  RESP: lungs are clear bilaterally per auscultation. Regular respiratory rate. No wheezes or rhonchi.  CV: Regular, rate and rhythm. No murmurs.  ABD: soft, nontender. Positive bowel sounds. No organomegaly.   MUSCULOSKELETAL: No lower extremity swelling.   NEURO: non focal. Alert and oriented x3.   PSYCH: within normal limits. No depression or anxiety.  SKIN: warm dry intact   LYMPH: no cervical, supraclavicular  lymphadenopathy    Lab Results  Lab Standing Order on 10/17/2018   Component Date Value Ref Range Status     PSA 10/17/2018 1.4  0.0 - 3.5 ng/mL Final     Sodium 10/17/2018 143  136 - 145 mmol/L Final     Potassium 10/17/2018 4.0  3.5 - 5.0 mmol/L Final     Chloride 10/17/2018 107  98 - 107 mmol/L Final     CO2 10/17/2018 27  22 - 31 mmol/L Final     Anion Gap, Calculation 10/17/2018 9  5 - 18 mmol/L Final     Glucose 10/17/2018 92  70 - 125 mg/dL Final     BUN 10/17/2018 18  8 - 22 mg/dL Final     Creatinine 10/17/2018 0.78  0.70 - 1.30 mg/dL Final     GFR MDRD Af Amer 10/17/2018 >60  >60 mL/min/1.73m2 Final     GFR MDRD Non Af Amer 10/17/2018 >60  >60 mL/min/1.73m2 Final     Bilirubin, Total 10/17/2018 0.5  0.0 - 1.0 mg/dL Final     Calcium 10/17/2018 9.8  8.5 - 10.5 mg/dL Final     Protein, Total 10/17/2018 6.8  6.0 - 8.0 g/dL Final     Albumin 10/17/2018 3.9  3.5 - 5.0 g/dL Final     Alkaline Phosphatase 10/17/2018 40* 45 - 120 U/L Final     AST 10/17/2018 14  0 - 40 U/L Final     ALT 10/17/2018 23  0 - 45 U/L Final     Immunoglobulin G 10/17/2018 908  700 - 1700 mg/dL Final     Immunoglobulin M 10/17/2018 39* 60 - 280 mg/dL Final     Immunoglobulin A 10/17/2018 369  65 - 400 mg/dL Final     [place] Free Lt Chain 10/17/2018 1.15  0.33 - 1.94 mg/dL Final     Lambda Free Lt Chain 10/17/2018 0.94  0.57 - 2.63 mg/dL Final     Kappa Lambda Ratio 10/17/2018 1.22  0.26 - 1.65 Final    PERFORMED AT  [address] [address]      Albumin % 10/17/2018 63.3  51.0 - 67.0 % Final     Albumin  10/17/2018 4.1  3.2 - 4.7 g/dL Final     Alpha 1 % 10/17/2018 2.4  2.0 - 4.0 % Final     Alpha 1 10/17/2018 0.2  0.1 - 0.3 g/dL Final     Alpha 2 % 10/17/2018 10.2  5.0 - 13.0 % Final     Alpha 2 10/17/2018 0.7  0.4 - 0.9 g/dL Final     % Beta 10/17/2018 11.6  10.0 - 17.0 % Final     Beta 10/17/2018 0.8  0.7 - 1.2 g/dL Final     Gamma Globulin % 10/17/2018 12.5  9.0 - 20.0 % Final      Gamma Globulin 10/17/2018 0.8  0.6 - 1.4 g/dL Final     ELP Comment 10/17/2018 Two possible faint bands are visible in the gamma globulin region of the gel strip, which may represent a monoclonal globulin, but is too faint to quantify. See immunofixation electrophoresis to further characterize.     Final     Protein, Total 10/17/2018 6.5  6.0 - 8.0 g/dL Final     Path ICD: 10/17/2018 C90.00   Final     Interpreted By: 10/17/2018 Kavin Muñiz MD   Final     Immunofixation Electrophoresis, Se* 10/17/2018 Two monoclonal IgA-kappa immunoglobulins present, one very faint.   Final     Path ICD: 10/17/2018 C90.00   Final     Interpreted By: 10/17/2018 Kavin Muñiz MD   Final     WBC 10/17/2018 10.3  4.0 - 11.0 thou/uL Final     RBC 10/17/2018 3.83* 4.40 - 6.20 mill/uL Final     Hemoglobin 10/17/2018 13.6* 14.0 - 18.0 g/dL Final     Hematocrit 10/17/2018 39.8* 40.0 - 54.0 % Final     MCV 10/17/2018 104* 80 - 100 fL Final     MCH 10/17/2018 35.5* 27.0 - 34.0 pg Final     MCHC 10/17/2018 34.2  32.0 - 36.0 g/dL Final     RDW 10/17/2018 12.1  11.0 - 14.5 % Final     Platelets 10/17/2018 225  140 - 440 thou/uL Final     MPV 10/17/2018 11.4  8.5 - 12.5 fL Final     Neutrophils % 10/17/2018 70  50 - 70 % Final     Lymphocytes % 10/17/2018 19* 20 - 40 % Final     Monocytes % 10/17/2018 9  2 - 10 % Final     Eosinophils % 10/17/2018 2  0 - 6 % Final     Basophils % 10/17/2018 0  0 - 2 % Final     Neutrophils Absolute 10/17/2018 7.2  2.0 - 7.7 thou/uL Final     Lymphocytes Absolute 10/17/2018 2.0  0.8 - 4.4 thou/uL Final     Monocytes Absolute 10/17/2018 1.0* 0.0 - 0.9 thou/uL Final     Eosinophils Absolute 10/17/2018 0.2  0.0 - 0.4 thou/uL Final     Basophils Absolute 10/17/2018 0.0  0.0 - 0.2 thou/uL Final   ]  PSA was 5.2 in May and 5.1 in July    Imaging    Mr Lumbar Spine With Contrast    Result Date: 9/28/2018  [facility] MR LUMBAR SPINE WITHOUT AND WITH CONTRAST 9/28/2018 1:00 PM INDICATION: Malignant  neoplasm of prostate TECHNIQUE: Without and with IV contrast. CONTRAST: Gadavist 10 COMPARISON: Outside lumbar spine MRI 03/29/2018. Abdomen and pelvis CT 04/12/2018. FINDINGS: There are 5 lumbar-type vertebral bodies. For the purposes of this report the lumbosacral junction will be labeled L5/S1. Fatty marrow replacement within the L1 vertebral body consistent with the patient's reported history of radiation therapy. There is a well-defined lesion within the L3 vertebral body measuring 7 mm with loss of signal on all sequences consistent with the  sclerotic lesion seen on prior abdomen and pelvis CT. Marked degenerative endplate changes at L5/S1. Superior plate Schmorl's node at L1 and L2. Anterior marginal osteophytes at T12/L1, L1/L2 and L2/L3. The vertebral bodies of the lumbar spine otherwise  have normal stature, alignment, and marrow signal intensity. The conus terminates at the superior endplate of L2 and has normal morphology and appearance. No abnormal contrast enhancement within the terminal spinal cord or cauda equina nerve roots. Disc desiccation and disc height loss at multiple levels, most pronounced at L5/S1. T12/L1:  No posterior disc bulge or spinal canal narrowing. No neural foraminal narrowing. L1/L2:  Symmetric disc bulge with a superimposed focal disc protrusion within the right central zone/lateral recess with cephalad migration along the posterior margin of the L1 vertebral body measuring 1.3 cm and craniocaudal dimension with mild spinal canal narrowing. No neural foraminal narrowing. L2/L3:  Symmetric disc bulge without spinal canal narrowing. No neural foraminal narrowing. L3/L4:  Symmetric disc bulge and mild facet arthropathy without spinal canal narrowing. No neural foraminal narrowing.  L4/L5:  Symmetric disc bulge, moderate facet arthropathy and dorsal epidural lipomatosis with moderate thecal sac narrowing. Moderate bilateral neural foraminal narrowing. L5/S1: Symmetric disc bulge,  mild facet arthropathy and circumferential epidural lipomatosis with moderate to severe thecal sac narrowing. Severe bilateral neural foraminal narrowing.     CONCLUSION: 1.  Fatty marrow replacement within the L3 vertebral body consistent with the patient's reported history of radiation therapy. Stable L3 sclerotic lesion. 2.  Degenerative lumbar spondylosis as described above, most pronounced at L4/L5 and L5/S1.     Mr Sacrum With Contrast    Result Date: 9/28/2018  [facility] MR SACRUM W CONTRAST 9/28/2018 2:37 PM                                                     INDICATION: Malignant neoplasm of prostate. TECHNIQUE: Routine. Additional postgadolinium fat-suppressed T1 sequences were obtained. IV CONTRAST: MRI without and with 10 mL Gadavist. COMPARISON: PET/CT scan 5/30/2018. FINDINGS: Advanced loss of T2 signal and loss of height at the lumbosacral interspace. Posterior annular bulge. The SI joints are negative for sacroiliitis, ankylosis, or diastasis. There is mild degenerative change at both SI joints. There is trace reactive edema on both sides of the left SI joint. There is no evidence for a focal bone lesion or skeletal metastasis.     CONCLUSION: 1.  There is mild degenerative change at both SI joints with trace reactive edema both sides of the left SI joint. There is no evidence for sacroiliitis, ankylosis, diastasis, or effusion. 2.  There is no evidence for skeletal metastasis. 3.  There is degenerative change at the lumbosacral interspace with what appears to be spinal canal narrowing at this level. This is better evaluated on the dedicated lumbar spine evaluation performed 9/28/2018. 4.  The visualized intrapelvic contents are negative.         Signed by: Aline Roth, CNP

## 2021-06-21 NOTE — PROGRESS NOTES
Pt arrived via wheelchair to clinic for Cycle # 6 Day # 22 of his chemotherapy regimen.  Administered SQ Velcade into RLQ of ABD per MD order.  Pt tolerated procedure well, no s/s of bleeding or swelling at site.  Pt left with guards via wheelchair.

## 2021-06-22 NOTE — PROGRESS NOTES
Pt arrives to [facility] center for treatment.  Pt voices no new concerns today.  Velcade given SQ as ordered without difficulty.  Pt left clinic stable to lobby accompanied by law enforcement.

## 2021-06-22 NOTE — PROGRESS NOTES
Aamuri came to clinic via wheelchair accompanied by DOC guards for his next dose of Velcade.  Pt assessed.  No labs needed for today. Velcade was given sq into his RUQ abdomen.  He tolerated the injection well and site was covered with a bandaid.  Amauri d/c from clinic via wheelchair accompanied by DOC guards.

## 2021-06-22 NOTE — PROGRESS NOTES
Amauri Shell, 58 y.o., male arrived to clinic in wheelchair accompanied by two law enforcment at 0800 for Cycle #7 Day #15 of his chemotherapy regimen. Medications reviewed and vital signs stable. Administered Velcade SQ per MD order. He  tolerated well, no s/s of infusion reaction. Site covered with cottonball and papertape (as patient is allergic to adhesives). Amauri Shell discharged with law enforcement at 0850.

## 2021-06-22 NOTE — PROGRESS NOTES
Pt given injection in L abdomen without incident.  Abdomen does have some red areas from previous injections but pt applies lotion, no open areas.  Pt d/c in wheel chair to lobby with DOC guards

## 2021-06-22 NOTE — PROGRESS NOTES
Amauri Shell, 58 y.o., male arrived to clinic in wheelchair accompanied by two law enforcment at 0830 for Cycle #8 Day #1 of his chemotherapy regimen. Medications reviewed and vital signs stable. No labs drawn today and no HM1 since 11/14/18. Per Aline Roth, CNP okay to proceed with today's treatment. Administered Velcade SQ per MD order. He  tolerated well, no s/s of infusion reaction. Amauri Shell discharged with law enforcement at 0940.

## 2021-06-22 NOTE — PROGRESS NOTES
Pt here for velcade injection.  Given in L abdomen without incident.  Pt continues to have low back pain and his R foot is num and painful.  Upon completion pt d/c in wheel chair to lobby with DOC guards. Pt will see NP next week

## 2021-06-22 NOTE — PROGRESS NOTES
[facility] Hematology and Oncology Progress Note    Patient: Amauri Shell  MRN: [id number]  Date of Service: 12/19/18          Reason for Visit    Cancer     Assessment and Plan    1.  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  Did receive radiation to this bone lesion.  He also started on systemic therapy.  Our Pathologist did review his case and they do feel that he has myeloma.  We will continue his weekly Velcade, dexamethasone.  He will continue 10 mg of dexamethasone weekly. We will monitor his myeloma labs as well.  He has side effects from this treatment we will likely put him on observation. His myeloma labs have plateaued. Still has some evidence on immunofixation.     2.  Metastatic prostate cancer: This was made from a biopsy of a L3 bone lesion.  He also had adenopathy.  He is currently on Lupron and in July 2018, was started on Zytiga. Tolerating this well. PSA is improved and is 1.0. Continue current regimen. Recheck psa in January      ECOG Performance   ECOG Performance Status: 1     Distress Assessment  Distress Assessment Score: 4:     Pain  Currently in Pain: Yes  Pain Score (Initial OR Reassessment): 1  Location: lower back      Problem List    1. Multiple myeloma not having achieved remission (H)  CC OFFICE VISIT LONG    Injection Appointment    Injection Appointment    Injection Appointment    Injection Appointment    CC OFFICE VISIT LONG    Injection Appointment    Injection Appointment    Injection Appointment    Injection Appointment    DISCONTINUED: bortezomib 3 mg chemo (VELCADE)    DISCONTINUED: diphenhydrAMINE injection 50 mg (BENADRYL)    DISCONTINUED: famotidine 20 mg/2 mL injection 20 mg (PEPCID)    DISCONTINUED: hydrocortisone sod succ (PF) injection 100 mg    DISCONTINUED: acetaminophen tablet 1,000 mg (TYLENOL)      ______________________________________________________________________________    History of Present Illness    Diagnosis:    1.  Metastatic prostate cancer: Metastatic disease to retroperitoneal pelvic lymph nodes.     2.  Solitary plasmacytoma of bone/myeloma: Involving L3.  Minimal bone marrow involvement. .   3.  Thyroid lesion: Likely goiter/thyroid nodule.  PET positive.      Treatment:  1. He was started on Lupron.  First dose November 24, 2017.  Zytiga added in July 2018 based on Latitude study.   2.  Started on treatment with Velcade and dexamethasone on 12/6/2017.  Weekly.    Was off from April until May due to being transferred to halfway.  He had radiation to the L3 metastases, I believe SBRT 20 gray ×1 fraction.  3. Observation. He had been followed every 6 months or so by ENT    Interim history:  Is here today to continue on treatment.  He has been getting weekly Velcade with weekly oral dexamethasone.  He states that he is doing okay with the injections has not noticed any side effects except he does have some intermittent skin redness/peeling and itching at the injection site. continues with chronic pain and constipation.      Pain Status  Currently in Pain: Yes    Review of Systems    Constitutional  Constitutional (WDL): Exceptions to WDL  Fatigue: Fatigue not relieved by rest - Limiting instrumental ADL  Weight Gain: 5 - <10% from baseline  Neurosensory  Neurosensory (WDL): Exceptions to WDL  Ataxia: Asymptomatic, clinical or diagnostic observations only, intervention not indicated  Peripheral Sensory Neuropathy: Asymptomatic, loss of deep tendon reflexes or paresthesia(feet)  Eye   Eye Disorder (WDL): All eye disorder elements are within defined limits  Ear  Ear Disorder (WDL): All ear disorder elements are within defined limits  Cardiovascular  Cardiovascular (WDL): All cardiovascular elements are within defined limits  Pulmonary  Respiratory (WDL): Exceptions to WDL  Dyspnea: Shortness of breath with moderate exertion  Gastrointestinal  Gastrointestinal (WDL): Exceptions to WDL  Constipation: Occasional or  intermittent symptoms, occasional use of stool softeners, laxatives, dietary modification, or enema  Nausea: Loss of appetite without alteration in eating habits  Dry Mouth: Symptomatic (e.g., dry or thick saliva) without significant dietary alteration, unstimulated saliva flow >0.2 ml/min  Genitourinary  Genitourinary (WDL): All genitourinary elements are within defined limits  Lymphatic  Lymph (WDL): All lymph disorder elements are within defined limits  Musculoskeletal and Connective Tissue  Musculoskeletal and Connetive Tissue Disorders (WDL): Exceptions to WDL  Arthralgia: Mild pain  Bone Pain: Mild pain  Integumentary  Integumentary (WDL): All integumentary elements are within defined limits  Patient Coping  Patient Coping: Accepting  Distress Assessment  Distress Assessment Score: 4  Accompanied by  Accompanied by: Law Enforcment  Oral Chemo Adherence       Past History  Past Medical History:   Diagnosis Date     Hypertension      Metastatic cancer (H)      Multiple myeloma (H)      Plasmacytoma (H)      Prostate cancer (H)        PHYSICAL EXAM:  /88   Pulse 83   Temp 98.6  F (37  C) (Oral)   Wt (!) 229 lb (103.9 kg)   SpO2 95%   BMI 32.86 kg/m    GENERAL: no acute distress. Cooperative in conversation. Here with 2 guards from DOC  HEENT: pupils are equal, round and reactive. Oromucosa is clean and intact. No ulcerations or mucositis noted. No bleeding noted.  RESP: lungs are clear bilaterally per auscultation. Regular respiratory rate. No wheezes or rhonchi.  CV: Regular, rate and rhythm. No murmurs.  ABD: soft, nontender. Positive bowel sounds. No organomegaly.   MUSCULOSKELETAL: No lower extremity swelling.   NEURO: non focal. Alert and oriented x3.   PSYCH: within normal limits. No depression or anxiety.  SKIN: warm dry intact   LYMPH: no cervical, supraclavicular lymphadenopathy    Lab Results  Infusion on 12/12/2018   Component Date Value Ref Range Status     Sodium 12/12/2018 143  136 - 145  mmol/L Final     Potassium 12/12/2018 4.2  3.5 - 5.0 mmol/L Final     Chloride 12/12/2018 106  98 - 107 mmol/L Final     CO2 12/12/2018 29  22 - 31 mmol/L Final     Anion Gap, Calculation 12/12/2018 8  5 - 18 mmol/L Final     Glucose 12/12/2018 95  70 - 125 mg/dL Final     BUN 12/12/2018 17  8 - 22 mg/dL Final     Creatinine 12/12/2018 0.74  0.70 - 1.30 mg/dL Final     GFR MDRD Af Amer 12/12/2018 >60  >60 mL/min/1.73m2 Final     GFR MDRD Non Af Amer 12/12/2018 >60  >60 mL/min/1.73m2 Final     Bilirubin, Total 12/12/2018 0.7  0.0 - 1.0 mg/dL Final     Calcium 12/12/2018 9.9  8.5 - 10.5 mg/dL Final     Protein, Total 12/12/2018 6.9  6.0 - 8.0 g/dL Final     Albumin 12/12/2018 4.0  3.5 - 5.0 g/dL Final     Alkaline Phosphatase 12/12/2018 43* 45 - 120 U/L Final     AST 12/12/2018 13  0 - 40 U/L Final     ALT 12/12/2018 17  0 - 45 U/L Final     Immunoglobulin G 12/12/2018 924  700-1,700 mg/dL Final     Immunoglobulin M 12/12/2018 36* 60 - 280 mg/dL Final     Immunoglobulin A 12/12/2018 382  65 - 400 mg/dL Final     Silo Free Lt Chain 12/12/2018 2.39* 0.33 - 1.94 mg/dL Final     Lambda Free Lt Chain 12/12/2018 1.24  0.57 - 2.63 mg/dL Final     Kappa Lambda Ratio 12/12/2018 1.93* 0.26 - 1.65 Final    PERFORMED AT  [address] [address]      Albumin % 12/12/2018 60.1  51.0 - 67.0 % Final     Albumin  12/12/2018 4.0  3.2 - 4.7 g/dL Final     Alpha 1 % 12/12/2018 2.7  2.0 - 4.0 % Final     Alpha 1 12/12/2018 0.2  0.1 - 0.3 g/dL Final     Alpha 2 % 12/12/2018 11.6  5.0 - 13.0 % Final     Alpha 2 12/12/2018 0.8  0.4 - 0.9 g/dL Final     % Beta 12/12/2018 12.4  10.0 - 17.0 % Final     Beta 12/12/2018 0.8  0.7 - 1.2 g/dL Final     Gamma Globulin % 12/12/2018 13.2  9.0 - 20.0 % Final     Gamma Globulin 12/12/2018 0.9  0.6 - 1.4 g/dL Final     ELP Comment 12/12/2018 A faint band is visible in the gamma globulin region of the gel strip, which may represent a monoclonal  globulin, but is too faint to quantify. See immunofixation electrophoresis to further characterize.     Final     Protein, Total 12/12/2018 6.7  6.0 - 8.0 g/dL Final     Path ICD: 12/12/2018 C90.00   Final     Interpreted By: 12/12/2018 Kavin Muñiz MD   Final     Immunofixation Electrophoresis, Se* 12/12/2018 Two monoclonal IgA-kappa immunoglobulins present, one very faint.      Final     Path ICD: 12/12/2018 C90.00   Final     Interpreted By: 12/12/2018 Kavin Muñiz MD   Final     Lab Results   Component Value Date    PSA 1.0 12/12/2018    PSA 1.2 11/14/2018    PSA 1.4 10/17/2018    PSA 5.1 (H) 07/18/2018    PSA 5.2 (H) 05/11/2018   ]      Imaging    No results found.      Signed by: Aline Roth, CNP

## 2021-06-22 NOTE — PROGRESS NOTES
Amauri came to clinic via wheelchair accompanied by DOC guards for his next dose of Velcade.  Pt assessed.  No labs needed for today. Velcade was given sq into his RUQ abdomen.  He tolerated the injection well and site was covered with a bandaid.  Amauri d/c from clinic via wheelchair accompanied by DOC guards.

## 2021-06-23 NOTE — PROGRESS NOTES
Pt here for injections after seeing NP. velcade given in abdomen without incident.  eligard given in R glut per pt request as his arm still hurts from last eligard.  Pt denies new complaint and d/c in wheel chair to lobby with DOC guards.

## 2021-06-23 NOTE — PROGRESS NOTES
[facility] Hematology and Oncology Progress Note    Patient: Amauri Shell  MRN: [id number]  Date of Service: 01/16/19          Reason for Visit    Myeloma  Prostate cancer    Assessment and Plan    1.  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  Did receive radiation to this bone lesion.  He also started on systemic therapy.  Our Pathologist did review his case and they do feel that he has myeloma.  We will continue his weekly Velcade, dexamethasone.  He will continue 10 mg of dexamethasone weekly. We will monitor his myeloma labs as well.  He has side effects from this treatment we will likely put him on observation. His myeloma labs have plateaued. Still has some evidence on immunofixation.     2.  Metastatic prostate cancer: This gnosis was made from a biopsy of a L3 bone lesion.  He also had adenopathy.  He is currently on Lupron and in July 2018, was started on Zytiga. Tolerating this well. PSA is improved and is 1.0. Continue current regimen.  Monitor PSA roughly every 3 months.      ECOG Performance   ECOG Performance Status: 1     Distress Assessment  Distress Assessment Score: 1:     Pain  Currently in Pain: Yes  Pain Score (Initial OR Reassessment): 4  Location: back and right leg: Chronic issues.  He does continue on MS Contin.  This is managed through the DOC      Problem List    1. Multiple myeloma not having achieved remission (H)  CC OFFICE VISIT LONG    CC OFFICE VISIT LONG    Injection Appointment    Injection Appointment    Injection Appointment    Injection Appointment      ______________________________________________________________________________    History of Present Illness    Diagnosis:   1.  Metastatic prostate cancer: Metastatic disease to retroperitoneal pelvic lymph nodes, bone lesion.     2.  Solitary plasmacytoma of bone/myeloma: Involving L3.  Minimal bone marrow involvement. .   3.  Thyroid lesion: Likely goiter/thyroid nodule.  PET  positive.      Treatment:  1. He was started on Lupron.  First dose November 24, 2017.  Zytiga added in July 2018 based on Latitude study.   2.  Started on treatment with Velcade and dexamethasone on 12/6/2017.  Weekly.    Was off from April until May due to being transferred to skilled nursing.  He had radiation to the L3 metastases, I believe SBRT 20 gray ×1 fraction.  3. Observation. He had been followed every 6 months or so by ENT    Interim history:  Is here today to continue on treatment.  He has been getting weekly Velcade with weekly oral dexamethasone.  Does like he does have worsening neuropathy in his hands.  It is intermittent and not bothering him too much.  States he started noticing a couple of months ago.  Continues with chronic pain and constipation.      Pain Status  Currently in Pain: Yes    Review of Systems    Constitutional  Constitutional (WDL): Exceptions to WDL  Fatigue: Fatigue relieved by rest  Weight Gain: 5 - <10% from baseline(4#)  Neurosensory  Neurosensory (WDL): Exceptions to WDL  Ataxia: Asymptomatic, clinical or diagnostic observations only, intervention not indicated(uses wheelchair long distances)  Peripheral Sensory Neuropathy: Moderate symptoms, limiting instrumental ADL(right foot)  Eye   Eye Disorder (WDL): All eye disorder elements are within defined limits  Ear  Ear Disorder (WDL): All ear disorder elements are within defined limits  Cardiovascular  Cardiovascular (WDL): All cardiovascular elements are within defined limits  Pulmonary  Respiratory (WDL): Exceptions to WDL  Dyspnea: Shortness of breath with moderate exertion(occ)  Gastrointestinal  Gastrointestinal (WDL): All gastrointestinal elements are within defined limits  Constipation: Occasional or intermittent symptoms, occasional use of stool softeners, laxatives, dietary modification, or enema(intermittant)  Diarrhea: Increase of <4 stools per day over baseline, mild increase in ostomy output compared to  baseline(intermittant)  Dry Mouth: Symptomatic (e.g., dry or thick saliva) without significant dietary alteration, unstimulated saliva flow >0.2 ml/min  Genitourinary  Genitourinary (WDL): All genitourinary elements are within defined limits  Lymphatic  Lymph (WDL): All lymph disorder elements are within defined limits  Musculoskeletal and Connective Tissue  Musculoskeletal and Connetive Tissue Disorders (WDL): Exceptions to WDL  Arthralgia: Mild pain  Bone Pain: Mild pain  Integumentary  Integumentary (WDL): Exceptions to WDL(bruises easily)  Patient Coping  Patient Coping: Accepting  Distress Assessment  Distress Assessment Score: 1  Accompanied by  Accompanied by: Law Enforcment  Oral Chemo Adherence       Past History  Past Medical History:   Diagnosis Date     Hypertension      Metastatic cancer (H)      Multiple myeloma (H)      Plasmacytoma (H)      Prostate cancer (H)        PHYSICAL EXAM:  BP (!) 133/95   Pulse 92   Temp 98.4  F (36.9  C) (Oral)   Wt (!) 233 lb (105.7 kg)   SpO2 95%   BMI 33.43 kg/m    GENERAL: no acute distress. Cooperative in conversation. Here with 2 guards from DOC  HEENT: pupils are equal, round and reactive. Oromucosa is clean and intact. No ulcerations or mucositis noted. No bleeding noted.  RESP: lungs are clear bilaterally per auscultation. Regular respiratory rate. No wheezes or rhonchi.  CV: Regular, rate and rhythm. No murmurs.  ABD: soft, nontender. Positive bowel sounds. No organomegaly.   MUSCULOSKELETAL: No lower extremity swelling.   NEURO: non focal. Alert and oriented x3.   PSYCH: within normal limits. No depression or anxiety.  SKIN: warm dry intact   LYMPH: no cervical, supraclavicular lymphadenopathy    Lab Results  Lab Standing Order on 01/09/2019   Component Date Value Ref Range Status     Sodium 01/09/2019 143  136 - 145 mmol/L Final     Potassium 01/09/2019 3.9  3.5 - 5.0 mmol/L Final     Chloride 01/09/2019 105  98 - 107 mmol/L Final     CO2 01/09/2019 31  22  - 31 mmol/L Final     Anion Gap, Calculation 01/09/2019 7  5 - 18 mmol/L Final     Glucose 01/09/2019 92  70 - 125 mg/dL Final     BUN 01/09/2019 17  8 - 22 mg/dL Final     Creatinine 01/09/2019 0.75  0.70 - 1.30 mg/dL Final     GFR MDRD Af Amer 01/09/2019 >60  >60 mL/min/1.73m2 Final     GFR MDRD Non Af Amer 01/09/2019 >60  >60 mL/min/1.73m2 Final     Bilirubin, Total 01/09/2019 0.6  0.0 - 1.0 mg/dL Final     Calcium 01/09/2019 9.6  8.5 - 10.5 mg/dL Final     Protein, Total 01/09/2019 6.7  6.0 - 8.0 g/dL Final     Albumin 01/09/2019 3.8  3.5 - 5.0 g/dL Final     Alkaline Phosphatase 01/09/2019 42* 45 - 120 U/L Final     AST 01/09/2019 13  0 - 40 U/L Final     ALT 01/09/2019 16  0 - 45 U/L Final     Immunoglobulin G 01/09/2019 885  700-1,700 mg/dL Final     Immunoglobulin M 01/09/2019 36* 60 - 280 mg/dL Final     Immunoglobulin A 01/09/2019 358  65 - 400 mg/dL Final     [place]hedral Free Lt Chain 01/09/2019 1.42  0.33 - 1.94 mg/dL Final     Lambda Free Lt Chain 01/09/2019 1.22  0.57 - 2.63 mg/dL Final     Kappa Lambda Ratio 01/09/2019 1.16  0.26 - 1.65 Final    PERFORMED AT  [address] [address]      Albumin % 01/09/2019 60.3  51.0 - 67.0 % Final     Albumin  01/09/2019 3.9  3.2 - 4.7 g/dL Final     Alpha 1 % 01/09/2019 2.6  2.0 - 4.0 % Final     Alpha 1 01/09/2019 0.2  0.1 - 0.3 g/dL Final     Alpha 2 % 01/09/2019 11.2  5.0 - 13.0 % Final     Alpha 2 01/09/2019 0.7  0.4 - 0.9 g/dL Final     % Beta 01/09/2019 12.4  10.0 - 17.0 % Final     Beta 01/09/2019 0.8  0.7 - 1.2 g/dL Final     Gamma Globulin % 01/09/2019 13.5  9.0 - 20.0 % Final     Gamma Globulin 01/09/2019 0.9  0.6 - 1.4 g/dL Final     ELP Comment 01/09/2019    Final                    Value:Two faint bands are visible in the gamma globulin region of the gel strip, which may represent a monoclonal globulin, but is too faint to quantify. Suggest immunofixation electrophoresis to further characterize  and/or repeat electrophoreses at intervals.         Protein, Total 01/09/2019 6.4  6.0 - 8.0 g/dL Final     Path ICD: 01/09/2019 C90.00   Final     Interpreted By: 01/09/2019 Kavin Muñiz MD   Final     Immunofixation Electrophoresis, Se* 01/09/2019 Two monoclonal IgA-kappa immunoglobulins present, one very faint.     Final     Path ICD: 01/09/2019 C90.00   Final     Interpreted By: 01/09/2019 Kavin Muñiz MD   Final     WBC 01/09/2019 8.9  4.0 - 11.0 thou/uL Final     RBC 01/09/2019 3.64* 4.40 - 6.20 mill/uL Final     Hemoglobin 01/09/2019 12.7* 14.0 - 18.0 g/dL Final     Hematocrit 01/09/2019 37.8* 40.0 - 54.0 % Final     MCV 01/09/2019 104* 80 - 100 fL Final     MCH 01/09/2019 34.9* 27.0 - 34.0 pg Final     MCHC 01/09/2019 33.6  32.0 - 36.0 g/dL Final     RDW 01/09/2019 12.7  11.0 - 14.5 % Final     Platelets 01/09/2019 248  140 - 440 thou/uL Final     MPV 01/09/2019 10.8  8.5 - 12.5 fL Final     Neutrophils % 01/09/2019 67  50 - 70 % Final     Lymphocytes % 01/09/2019 22  20 - 40 % Final     Monocytes % 01/09/2019 9  2 - 10 % Final     Eosinophils % 01/09/2019 2  0 - 6 % Final     Basophils % 01/09/2019 0  0 - 2 % Final     Neutrophils Absolute 01/09/2019 6.0  2.0 - 7.7 thou/uL Final     Lymphocytes Absolute 01/09/2019 1.9  0.8 - 4.4 thou/uL Final     Monocytes Absolute 01/09/2019 0.8  0.0 - 0.9 thou/uL Final     Eosinophils Absolute 01/09/2019 0.2  0.0 - 0.4 thou/uL Final     Basophils Absolute 01/09/2019 0.0  0.0 - 0.2 thou/uL Final     Lab Results   Component Value Date    PSA 1.0 12/12/2018    PSA 1.2 11/14/2018    PSA 1.4 10/17/2018    PSA 5.1 (H) 07/18/2018    PSA 5.2 (H) 05/11/2018   ]      Imaging    No results found.      Signed by: Aline Roth, CNP

## 2021-06-23 NOTE — PROGRESS NOTES
Pt arrives to infusion center for treatment.  Pt voices no new concerns today.  Velcade injection given as ordered without difficulty.  Pt left clinic stable to lobby accompanied by law enforcement.

## 2021-06-23 NOTE — PROGRESS NOTES
Pt arrives to infusion center for labs and treatment.  Labs drawn w/results noted.  Velcade given as ordered without difficulty.  Pt left clinic stable accompanied by law enforcement.

## 2021-06-23 NOTE — PROGRESS NOTES
0800 Amauri arrived A&Ox4 accompanied by 2 DOC guards. Pt reports on going night sweats, hot flashes, dry eyes, dry mouth and low back R leg pain. He also reports some minor gum bleeding that he attibutes to some sores that are healing. Pt was encouraged to report any new or worsening symptoms.  velcade inj SQ to R lower abd; pt states previous inj sites are sore.  Paperwork given to guards. Dc education reviewed, Amauri stated understanding and that his needs were met today.  0850 Amauri dc'd A&OX4 via w/c, in stable condition, with 2DOC guards

## 2021-06-24 NOTE — PROGRESS NOTES
Amauri Shell, 58 y.o., male arrived to clinic in wheelchair accompanied by two law enforcment at 0805 for Cycle #10 Day #15 of his chemotherapy regimen. Medications reviewed and vital signs stable (low grade fever of 99.0). No labs ordered or drawn today. Patient complains of cough with lots of sputum and feeling more fatigued than usual. Patient brought sputum sample with and said he was told to bring it by someone at [facility]. [facility] nurse informed (as no sputum specimen was ordered). Triage contacted DOC and per Triage no sputum needs to be collected. Administered Velcade SQ per MD order. He tolerated well, no s/s of infusion reaction. Amauri Shlel discharged in wheelchair with law enforcement at 0900.

## 2021-06-24 NOTE — PROGRESS NOTES
"[facility] Hematology and Oncology Progress Note    Patient: Amauri Shell  MRN: [id number]  Date of Service: 02/13/2019      Assessment and Plan:    1.    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  He is tolerating this treatment well.  He is been on for some time and his numbers are controlled.   We will continue without change.  We will check his myeloma labs every he 3 months.  Will be seen in clinic every 3 months.  Continue acyclovir prophylaxis.  As he has metastatic prostate cancer, which generally carries a worse prognosis than myeloma, we will not refer him for autologous transplant.    2.  Metastatic prostate cancer: Now on Lupron and Zytiga.  PSA in the serum continues to trend down.  We will stop the 5 twice a day prednisone as he is on dexamethasone weekly.     3.  Thyroid nodule:  Previous ultrasound was Consistent with diffuse thyroiditis.  We will continue to follow with thyroid studies.    ECOG Performance   ECOG Performance Status: 1    Distress Assessment  Distress Assessment Score: 4    Pain  Currently in Pain: Yes  Pain Score (Initial OR Reassessment): 3  Location: low back, right leg    Diagnosis:    1.  Plasma cell dyscrasia: Diagnostically, it has been somewhat of a challenge.  He does not appear to have any lytic lesions but does appear to have malignant plasma cells and a sclerotic lesion where there is also found metastatic prostate cancer.  His initial bone marrow biopsy report was reviewed by our pathologists count 10-15% malignant plasma cells. No \"CRAB\" findings.  He has not clearly fulfill the diagnostic criteria by the international myeloma working group.  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    2.  Metastatic prostate cancer:  Metastatic disease to retroperitoneal and pelvic lymph nodes.  Also L3.  .     3.  Thyroid lesion: Likely goiter/thyroid nodule.  PET positive.  He had " been followed every 6 months or so by ENT    Treatment:    1.  Smoldering myeloma: He started treatment with Velcade and dexamethasone on 2017.  Days 1, 8, 15 of a 21 day cycle.  He also had radiation to the L3 metastasis, I believe SBRT 20 ken ×1 fraction.    2.  Prostate cancer: Started on Lupron in 2017. Abiraterone added in .    Interim History:    Amauri returns today for follow-up visit.  Continues to do okay.  He is not having any pain.  Denies numbness or tingling in the hands or feet.  No lower extremity edema.  Seems to have less side effects after cutting down his dexamethasone dose to 20 mg a few months ago.  He has chronic low back pain.  Having less hot flashes.    Review of Systems:    Constitutional  Constitutional (WDL): Exceptions to WDL  Fatigue: Fatigue not relieved by rest - Limiting instrumental ADL  Chills: Mild sensation of cold, shivering, chattering of teeth(over the weekend)  Neurosensory  Neurosensory (WDL): Exceptions to WDL  Ataxia: Asymptomatic, clinical or diagnostic observations only, intervention not indicated  Peripheral Sensory Neuropathy: Asymptomatic, loss of deep tendon reflexes or paresthesia(right leg)  Cardiovascular  Cardiovascular (WDL): All cardiovascular elements are within defined limits  Pulmonary  Respiratory (WDL): Exceptions to WDL  Cough: Mild symptoms, nonprescription intervention indicated  Dyspnea: Shortness of breath with minimal exertion, limiting instrumental ADL  Gastrointestinal  Gastrointestinal (WDL): Exceptions to WDL  Anorexia: Loss of appetite without alteration in eating habits  Nausea: Loss of appetite without alteration in eating habits(2-3 days over weekend)  Vomitin - 2 episodes ( by 5 minutes) in 24 hrs(2-3 days over weekend)  Dry Mouth: Symptomatic (e.g., dry or thick saliva) without significant dietary alteration, unstimulated saliva flow >0.2 ml/min  Genitourinary  Genitourinary (WDL): All  genitourinary elements are within defined limits  Integumentary  Integumentary (WDL): All integumentary elements are within defined limits  Patient Coping  Patient Coping: Accepting  Accompanied by  Accompanied by: Law Enforcment    Past History:    Past Medical History:   Diagnosis Date     Hypertension      Metastatic cancer (H)      Multiple myeloma (H)      Plasmacytoma (H)      Prostate cancer (H)      Physical Exam:    Recent Vitals 2/13/2019   Weight 229 lbs   BSA (m2) 2.27 m2   /75   Pulse 91   Temp 98.3   Temp src 1   SpO2 94   Some recent data might be hidden     General: patient appears stated age of 58 y.o.. Nontoxic and in no distress.   HEENT: Head: atraumatic, normocephalic. Sclerae anicteric.  Chest:  Normal respiratory effort.  Clear to auscultation bilaterally.  Cardiac:  No edema.  Regular rate and rhythm.  No murmur.  Abdomen: abdomen is non-distended  Extremities: normal tone and muscle bulk.  Skin: no lesions or rash. Warm and dry.   CNS: alert and oriented. Grossly non-focal.   Psychiatric: normal mood and affect.     Lab Results:    Recent Results (from the past 240 hour(s))   Comprehensive Metabolic Panel    Collection Time: 02/06/19  7:53 AM   Result Value Ref Range    Sodium 142 136 - 145 mmol/L    Potassium 3.9 3.5 - 5.0 mmol/L    Chloride 107 98 - 107 mmol/L    CO2 29 22 - 31 mmol/L    Anion Gap, Calculation 6 5 - 18 mmol/L    Glucose 105 70 - 125 mg/dL    BUN 21 8 - 22 mg/dL    Creatinine 0.76 0.70 - 1.30 mg/dL    GFR MDRD Af Amer >60 >60 mL/min/1.73m2    GFR MDRD Non Af Amer >60 >60 mL/min/1.73m2    Bilirubin, Total 0.7 0.0 - 1.0 mg/dL    Calcium 9.5 8.5 - 10.5 mg/dL    Protein, Total 6.8 6.0 - 8.0 g/dL    Albumin 3.8 3.5 - 5.0 g/dL    Alkaline Phosphatase 43 (L) 45 - 120 U/L    AST 15 0 - 40 U/L    ALT 20 0 - 45 U/L   Immunoglobulins, Quantitative    Collection Time: 02/06/19  7:53 AM   Result Value Ref Range    Immunoglobulin G 896 700-1,700 mg/dL    Immunoglobulin M 41 (L) 60  - 280 mg/dL    Immunoglobulin A 372 65 - 400 mg/dL   Immunoglobulin Free Light Chains, Serum    Collection Time: 02/06/19  7:53 AM   Result Value Ref Range    Kappa Free Lt Chain 1.41 0.33 - 1.94 mg/dL    Lambda Free Lt Chain 1.20 0.57 - 2.63 mg/dL    Kappa Lambda Ratio 1.18 0.26 - 1.65   Electrophoresis, Protein, Serum    Collection Time: 02/06/19  7:53 AM   Result Value Ref Range    Albumin % 60.1 51.0 - 67.0 %    Albumin  3.8 3.2 - 4.7 g/dL    Alpha 1 % 2.6 2.0 - 4.0 %    Alpha 1 0.2 0.1 - 0.3 g/dL    Alpha 2 % 11.5 5.0 - 13.0 %    Alpha 2 0.7 0.4 - 0.9 g/dL    % Beta 12.6 10.0 - 17.0 %    Beta 0.8 0.7 - 1.2 g/dL    Gamma Globulin % 13.2 9.0 - 20.0 %    Gamma Globulin 0.8 0.6 - 1.4 g/dL    ELP Comment       Two faint bands are visible in the gamma globulin region of the gel strip, which may represent a monoclonal globulin, but is too faint to quantify. See immunofixation electrophoresis results.    Protein, Total 6.4 6.0 - 8.0 g/dL    Path ICD: C90.00     Interpreted By: Kavin Muñiz MD    Immunofixation Electrophoresis, Serum    Collection Time: 02/06/19  7:53 AM   Result Value Ref Range    Immunofixation Electrophoresis, Serum Monoclonal IgA-kappa immunoglobulin present.      Path ICD: C90.00     Interpreted By: Kavin Muñiz MD    HM1 (CBC with Diff)    Collection Time: 02/06/19  7:53 AM   Result Value Ref Range    WBC 10.9 4.0 - 11.0 thou/uL    RBC 3.58 (L) 4.40 - 6.20 mill/uL    Hemoglobin 12.6 (L) 14.0 - 18.0 g/dL    Hematocrit 37.8 (L) 40.0 - 54.0 %     (H) 80 - 100 fL    MCH 35.2 (H) 27.0 - 34.0 pg    MCHC 33.3 32.0 - 36.0 g/dL    RDW 12.6 11.0 - 14.5 %    Platelets 271 140 - 440 thou/uL    MPV 10.7 8.5 - 12.5 fL    Neutrophils % 82 (H) 50 - 70 %    Lymphocytes % 11 (L) 20 - 40 %    Monocytes % 5 2 - 10 %    Eosinophils % 1 0 - 6 %    Basophils % 1 0 - 2 %    Neutrophils Absolute 8.9 (H) 2.0 - 7.7 thou/uL    Lymphocytes Absolute 1.2 0.8 - 4.4 thou/uL    Monocytes Absolute 0.6 0.0 - 0.9  thou/uL    Eosinophils Absolute 0.1 0.0 - 0.4 thou/uL    Basophils Absolute 0.1 0.0 - 0.2 thou/uL     Imaging:    No new imaging.      Signed by: Cosmo Carl MD

## 2021-06-24 NOTE — PROGRESS NOTES
Pt arrives to infusion center for treatment following MD visit.  Pt was seen by Dr. Carl today and orders were approved.  Treatment administered as ordered without difficulty.  Pt left clinic stable to lobby accompanied by law enforcement.

## 2021-06-24 NOTE — PROGRESS NOTES
"Patient arrived via wheelchair with 2 law enforcement officers. Patient reports feels his \"pneumonia is getting worse\". Reports \"grey/green phlegm\" is continuing/ reports has cough, feels dizziness and lightheaded with walking/standing up/ reports his oral antibiotic was completed last week on Thursday. Vital signs taken and noted hypotensive with orthostatic b/p to 75/46 standing with HR elevation to 115 when standing with reports of feeling dizzy.  DARRYL Roth NP notified of findings with orders received to Hold the Velcade today and patient to receive 1 liter of 0.9NaCl today/and patient to see doctor at law enforcement facility and patient does report he has appointment to see doctor today there. IV access obtained for IV hydration and patient tolerated infusion with no complaints/ patient also able to take OJ 240ml orally. Vital signs taken after infusion with improvement noted and patient reported felt \"a little better\". IV removed from site with hemostasis achieved. Patient discharged via wheelchair with 2 law enforcement officers along with paperwork to return to facility with notification for patient to see MD there.   "

## 2021-06-24 NOTE — PROGRESS NOTES
Amauri came to chemo infusion this morning for labs and his next dose of velcade. Peripheral lab draw done and results noted.  VSS.  Pt assessed.  Pt reports that he was diagnosed with pneumonia last Thursday.  He actually feels a bit better.  This information was reviewed with Aline Roth CNP who instructed this RN to proceed with treatment if he wants it.  Amauri did state he would like to be treated.  Velcade was given sq into his.  He tolerated the injection well and site was covered with a bandaid.  Amauri d/c from clinic via wheelchair accompanied by [employer] guards. Summary of care sent

## 2021-06-25 NOTE — PROGRESS NOTES
Amauri came to clinic via wheelchair accompanied by [facility] guards for his next dose of Velcade.  Pt assessed.  No labs needed for today. Velcade was given sq into his LUQ abdomen.  He tolerated the injection well and site was covered with a bandaid.  Amauri d/c from clinic via wheelchair accompanied by [employer] guards.

## 2021-07-03 NOTE — ADDENDUM NOTE
Addendum Note by Wilfredo Fields at 7/24/2019  8:00 AM     Author: Wilfredo Fields Service: -- Author Type:     Filed: 7/24/2019 10:32 AM Encounter Date: 7/24/2019 Status: Signed    : Wilfredo Fields ()    Addended by: WILFREDO FIELDS on: 7/24/2019 10:32 AM        Modules accepted: Orders

## 2022-07-18 NOTE — TELEPHONE ENCOUNTER
LM for Jaelyn to call me.  The  lung clinic having some difficulties getting Amauri scheduled for a consult.  I am trying to help clarify.  I will send an e-mail to Jaelyn as well.  Call Vicki () back at [phone].  
stated

## 2024-02-13 NOTE — PROGRESS NOTES
Pt came into infusion clinic for Velcade as ordered.Pt states he is having back pain and Flexeril is no longer being given at [facility]. Per Dr. Carl, pt was informed that Dr. Marcial can manage his pain at the [facility]. Dr. Carl is ok with what Dr. Marcial decides. Also, pt states he has not been started on Synthroid yet. Pt is aware this needs to be started and will ask about it. Pt tolerated injection well and left infusion clinic accompanied by guards.  
---
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
